# Patient Record
Sex: FEMALE | Race: BLACK OR AFRICAN AMERICAN | NOT HISPANIC OR LATINO | Employment: FULL TIME | URBAN - METROPOLITAN AREA
[De-identification: names, ages, dates, MRNs, and addresses within clinical notes are randomized per-mention and may not be internally consistent; named-entity substitution may affect disease eponyms.]

---

## 2017-03-03 ENCOUNTER — COMMUNICATION - HEALTHEAST (OUTPATIENT)
Dept: FAMILY MEDICINE | Facility: CLINIC | Age: 32
End: 2017-03-03

## 2017-03-03 DIAGNOSIS — F33.9 MAJOR DEPRESSIVE DISORDER, RECURRENT EPISODE, UNSPECIFIED: ICD-10-CM

## 2017-03-29 ENCOUNTER — OFFICE VISIT - HEALTHEAST (OUTPATIENT)
Dept: FAMILY MEDICINE | Facility: CLINIC | Age: 32
End: 2017-03-29

## 2017-03-29 DIAGNOSIS — F33.9 EPISODE OF RECURRENT MAJOR DEPRESSIVE DISORDER, UNSPECIFIED DEPRESSION EPISODE SEVERITY (H): ICD-10-CM

## 2017-03-29 DIAGNOSIS — F33.9 MAJOR DEPRESSIVE DISORDER, RECURRENT EPISODE, UNSPECIFIED: ICD-10-CM

## 2017-03-29 DIAGNOSIS — N39.0 UTI (URINARY TRACT INFECTION): ICD-10-CM

## 2017-03-29 DIAGNOSIS — M79.641 BILATERAL HAND PAIN: ICD-10-CM

## 2017-03-29 DIAGNOSIS — M79.642 BILATERAL HAND PAIN: ICD-10-CM

## 2017-03-29 DIAGNOSIS — D64.9 ANEMIA: ICD-10-CM

## 2017-03-29 DIAGNOSIS — R53.83 FATIGUE: ICD-10-CM

## 2017-03-29 DIAGNOSIS — E55.9 VITAMIN D DEFICIENCY: ICD-10-CM

## 2017-03-29 DIAGNOSIS — R30.0 DYSURIA: ICD-10-CM

## 2017-03-29 ASSESSMENT — MIFFLIN-ST. JEOR: SCORE: 1389.42

## 2017-04-08 ENCOUNTER — COMMUNICATION - HEALTHEAST (OUTPATIENT)
Dept: FAMILY MEDICINE | Facility: CLINIC | Age: 32
End: 2017-04-08

## 2017-04-09 ENCOUNTER — COMMUNICATION - HEALTHEAST (OUTPATIENT)
Dept: FAMILY MEDICINE | Facility: CLINIC | Age: 32
End: 2017-04-09

## 2017-04-10 ENCOUNTER — COMMUNICATION - HEALTHEAST (OUTPATIENT)
Dept: FAMILY MEDICINE | Facility: CLINIC | Age: 32
End: 2017-04-10

## 2017-04-10 ENCOUNTER — OFFICE VISIT - HEALTHEAST (OUTPATIENT)
Dept: FAMILY MEDICINE | Facility: CLINIC | Age: 32
End: 2017-04-10

## 2017-04-10 DIAGNOSIS — T78.40XA ALLERGIC REACTION CAUSED BY A DRUG: ICD-10-CM

## 2017-04-10 DIAGNOSIS — R21 RASH: ICD-10-CM

## 2017-04-13 ENCOUNTER — COMMUNICATION - HEALTHEAST (OUTPATIENT)
Dept: FAMILY MEDICINE | Facility: CLINIC | Age: 32
End: 2017-04-13

## 2017-04-13 ENCOUNTER — OFFICE VISIT - HEALTHEAST (OUTPATIENT)
Dept: FAMILY MEDICINE | Facility: CLINIC | Age: 32
End: 2017-04-13

## 2017-04-13 DIAGNOSIS — R21 RASH: ICD-10-CM

## 2017-04-13 DIAGNOSIS — R21 MALAR RASH: ICD-10-CM

## 2017-04-13 DIAGNOSIS — N39.0 UTI (URINARY TRACT INFECTION): ICD-10-CM

## 2017-04-15 ENCOUNTER — COMMUNICATION - HEALTHEAST (OUTPATIENT)
Dept: SCHEDULING | Facility: CLINIC | Age: 32
End: 2017-04-15

## 2017-04-17 LAB
LAB AP CHARGES (HE HISTORICAL CONVERSION): NORMAL
PATH REPORT.COMMENTS IMP SPEC: NORMAL
PATH REPORT.COMMENTS IMP SPEC: NORMAL
PATH REPORT.FINAL DX SPEC: NORMAL
PATH REPORT.GROSS SPEC: NORMAL
PATH REPORT.MICROSCOPIC SPEC OTHER STN: NORMAL
PATH REPORT.RELEVANT HX SPEC: NORMAL
RESULT FLAG (HE HISTORICAL CONVERSION): NORMAL

## 2017-04-18 LAB
ANA SER QL: 0.9 U
DNA (DS) ANTIBODY - HISTORICAL: 6 IU

## 2017-05-04 ENCOUNTER — COMMUNICATION - HEALTHEAST (OUTPATIENT)
Dept: FAMILY MEDICINE | Facility: CLINIC | Age: 32
End: 2017-05-04

## 2017-05-15 ENCOUNTER — RECORDS - HEALTHEAST (OUTPATIENT)
Dept: GENERAL RADIOLOGY | Age: 32
End: 2017-05-15

## 2017-05-15 ENCOUNTER — OFFICE VISIT - HEALTHEAST (OUTPATIENT)
Dept: RHEUMATOLOGY | Facility: CLINIC | Age: 32
End: 2017-05-15

## 2017-05-15 DIAGNOSIS — M25.50 PAIN IN UNSPECIFIED JOINT: ICD-10-CM

## 2017-05-15 DIAGNOSIS — M13.0 POLYARTHROPATHY, MULTIPLE SITES: ICD-10-CM

## 2017-05-15 DIAGNOSIS — M25.50 POLYARTHRALGIA: ICD-10-CM

## 2017-05-15 DIAGNOSIS — M13.0 POLYARTHRITIS, UNSPECIFIED: ICD-10-CM

## 2017-05-16 LAB
C-ANCA - HISTORICAL: NEGATIVE
HCV AB SERPL QL IA: NEGATIVE
P-ANCA - HISTORICAL: NEGATIVE

## 2017-07-05 ENCOUNTER — OFFICE VISIT - HEALTHEAST (OUTPATIENT)
Dept: RHEUMATOLOGY | Facility: CLINIC | Age: 32
End: 2017-07-05

## 2017-07-05 DIAGNOSIS — R53.83 FATIGUE: ICD-10-CM

## 2017-07-05 DIAGNOSIS — M25.50 POLYARTHRALGIA: ICD-10-CM

## 2017-07-05 DIAGNOSIS — R21 RASH: ICD-10-CM

## 2017-07-05 ASSESSMENT — MIFFLIN-ST. JEOR: SCORE: 1390.32

## 2017-08-30 ENCOUNTER — OFFICE VISIT - HEALTHEAST (OUTPATIENT)
Dept: RHEUMATOLOGY | Facility: CLINIC | Age: 32
End: 2017-08-30

## 2017-08-30 DIAGNOSIS — M79.641 BILATERAL HAND PAIN: ICD-10-CM

## 2017-08-30 DIAGNOSIS — M79.642 BILATERAL HAND PAIN: ICD-10-CM

## 2017-08-30 DIAGNOSIS — M25.50 POLYARTHRALGIA: ICD-10-CM

## 2017-08-30 DIAGNOSIS — M06.4 INFLAMMATORY POLYARTHRITIS (H): ICD-10-CM

## 2017-08-30 ASSESSMENT — MIFFLIN-ST. JEOR: SCORE: 1368.55

## 2017-10-17 ENCOUNTER — COMMUNICATION - HEALTHEAST (OUTPATIENT)
Dept: FAMILY MEDICINE | Facility: CLINIC | Age: 32
End: 2017-10-17

## 2017-10-17 DIAGNOSIS — F33.0 MILD EPISODE OF RECURRENT MAJOR DEPRESSIVE DISORDER (H): ICD-10-CM

## 2017-11-15 ENCOUNTER — COMMUNICATION - HEALTHEAST (OUTPATIENT)
Dept: RHEUMATOLOGY | Facility: CLINIC | Age: 32
End: 2017-11-15

## 2017-11-15 DIAGNOSIS — M06.4 INFLAMMATORY POLYARTHRITIS (H): ICD-10-CM

## 2017-11-21 ENCOUNTER — HOSPITAL ENCOUNTER (OUTPATIENT)
Dept: MRI IMAGING | Facility: HOSPITAL | Age: 32
Discharge: HOME | End: 2017-11-21
Attending: FAMILY MEDICINE

## 2017-11-21 ENCOUNTER — OFFICE VISIT - HEALTHEAST (OUTPATIENT)
Dept: FAMILY MEDICINE | Facility: CLINIC | Age: 32
End: 2017-11-21

## 2017-11-21 DIAGNOSIS — F33.0 MILD EPISODE OF RECURRENT MAJOR DEPRESSIVE DISORDER (H): ICD-10-CM

## 2017-11-21 DIAGNOSIS — M54.2 ACUTE NECK PAIN: ICD-10-CM

## 2017-11-21 DIAGNOSIS — R29.2 HYPERREFLEXIA OF LOWER EXTREMITY: ICD-10-CM

## 2017-11-21 DIAGNOSIS — R53.1 WEAKNESS OF LEFT SIDE OF BODY: ICD-10-CM

## 2017-11-21 ASSESSMENT — MIFFLIN-ST. JEOR: SCORE: 1348.59

## 2017-11-21 NOTE — ASSESSMENT & PLAN NOTE
CBC is normal.  MRI is also unremarkable.  I suspect she has some inflammation in her cervical spine that is causing her symptoms.      I have recommended prednisone 10 mg orally per day (and wean down gradually as tolerated,  as long as sx are abated, back to 2.5mg orally per day).  She says she has plenty of prednisone at home from Dr. Millan so she will start taking 10 mg orally per day and I have asked her to call me tomorrow to ensure that this is helping her symptoms.    See hyperreflexia of upper and lower extremity on the left side and weakness of the left side in the problem list    Neurology consult has been obtained and she has an appointment with the neurologist on December 12.

## 2017-11-29 ENCOUNTER — OFFICE VISIT - HEALTHEAST (OUTPATIENT)
Dept: RHEUMATOLOGY | Facility: CLINIC | Age: 32
End: 2017-11-29

## 2017-11-29 DIAGNOSIS — M79.642 BILATERAL HAND PAIN: ICD-10-CM

## 2017-11-29 DIAGNOSIS — M06.4 INFLAMMATORY POLYARTHRITIS (H): ICD-10-CM

## 2017-11-29 DIAGNOSIS — M79.641 BILATERAL HAND PAIN: ICD-10-CM

## 2017-11-29 DIAGNOSIS — M25.50 POLYARTHRALGIA: ICD-10-CM

## 2017-11-29 ASSESSMENT — MIFFLIN-ST. JEOR: SCORE: 1348.59

## 2017-12-02 ENCOUNTER — COMMUNICATION - HEALTHEAST (OUTPATIENT)
Dept: RHEUMATOLOGY | Facility: CLINIC | Age: 32
End: 2017-12-02

## 2017-12-02 DIAGNOSIS — M06.4 INFLAMMATORY POLYARTHRITIS (H): ICD-10-CM

## 2017-12-02 DIAGNOSIS — M79.641 BILATERAL HAND PAIN: ICD-10-CM

## 2017-12-02 DIAGNOSIS — M79.642 BILATERAL HAND PAIN: ICD-10-CM

## 2018-02-13 ENCOUNTER — COMMUNICATION - HEALTHEAST (OUTPATIENT)
Dept: RHEUMATOLOGY | Facility: CLINIC | Age: 33
End: 2018-02-13

## 2018-02-13 DIAGNOSIS — M06.4 INFLAMMATORY POLYARTHRITIS (H): ICD-10-CM

## 2018-02-21 ENCOUNTER — OFFICE VISIT - HEALTHEAST (OUTPATIENT)
Dept: FAMILY MEDICINE | Facility: CLINIC | Age: 33
End: 2018-02-21

## 2018-02-21 DIAGNOSIS — H69.92 EUSTACHIAN TUBE DYSFUNCTION, LEFT: ICD-10-CM

## 2018-02-21 DIAGNOSIS — R07.0 THROAT PAIN: ICD-10-CM

## 2018-02-21 LAB
DEPRECATED S PYO AG THROAT QL EIA: NORMAL
FLUAV AG SPEC QL IA: NORMAL
FLUBV AG SPEC QL IA: NORMAL

## 2018-02-23 LAB — GROUP A STREP BY PCR: NORMAL

## 2018-05-02 ENCOUNTER — OFFICE VISIT - HEALTHEAST (OUTPATIENT)
Dept: RHEUMATOLOGY | Facility: CLINIC | Age: 33
End: 2018-05-02

## 2018-05-02 DIAGNOSIS — M06.4 INFLAMMATORY POLYARTHRITIS (H): ICD-10-CM

## 2018-05-02 DIAGNOSIS — M25.50 POLYARTHRALGIA: ICD-10-CM

## 2018-05-02 ASSESSMENT — MIFFLIN-ST. JEOR: SCORE: 1393.95

## 2018-10-24 ENCOUNTER — OFFICE VISIT - HEALTHEAST (OUTPATIENT)
Dept: FAMILY MEDICINE | Facility: CLINIC | Age: 33
End: 2018-10-24

## 2018-10-24 DIAGNOSIS — F33.9 MAJOR DEPRESSIVE DISORDER, RECURRENT EPISODE (H): ICD-10-CM

## 2018-11-05 ENCOUNTER — COMMUNICATION - HEALTHEAST (OUTPATIENT)
Dept: FAMILY MEDICINE | Facility: CLINIC | Age: 33
End: 2018-11-05

## 2018-11-28 ENCOUNTER — OFFICE VISIT - HEALTHEAST (OUTPATIENT)
Dept: FAMILY MEDICINE | Facility: CLINIC | Age: 33
End: 2018-11-28

## 2018-11-28 DIAGNOSIS — N64.89 BREAST ASYMMETRY IN FEMALE: ICD-10-CM

## 2018-11-28 DIAGNOSIS — Z12.4 SCREENING FOR MALIGNANT NEOPLASM OF CERVIX: ICD-10-CM

## 2018-11-28 DIAGNOSIS — D64.9 ANEMIA, UNSPECIFIED TYPE: ICD-10-CM

## 2018-11-28 DIAGNOSIS — Z00.00 PREVENTATIVE HEALTH CARE: ICD-10-CM

## 2018-11-28 DIAGNOSIS — F33.0 MILD EPISODE OF RECURRENT MAJOR DEPRESSIVE DISORDER (H): ICD-10-CM

## 2018-11-28 DIAGNOSIS — F33.9 MAJOR DEPRESSIVE DISORDER, RECURRENT EPISODE (H): ICD-10-CM

## 2018-11-28 DIAGNOSIS — Z13.228 ENCOUNTER FOR SCREENING FOR METABOLIC DISORDER: ICD-10-CM

## 2018-11-28 DIAGNOSIS — E55.9 VITAMIN D DEFICIENCY: ICD-10-CM

## 2018-11-28 DIAGNOSIS — Z23 NEEDS FLU SHOT: ICD-10-CM

## 2018-11-28 DIAGNOSIS — Z13.220 LIPID SCREENING: ICD-10-CM

## 2018-11-28 LAB
ALBUMIN SERPL-MCNC: 4.3 G/DL (ref 3.5–5)
ALP SERPL-CCNC: 46 U/L (ref 45–120)
ALT SERPL W P-5'-P-CCNC: 16 U/L (ref 0–45)
ANION GAP SERPL CALCULATED.3IONS-SCNC: 8 MMOL/L (ref 5–18)
AST SERPL W P-5'-P-CCNC: 21 U/L (ref 0–40)
BASOPHILS # BLD AUTO: 0 THOU/UL (ref 0–0.2)
BASOPHILS NFR BLD AUTO: 0 % (ref 0–2)
BILIRUB SERPL-MCNC: 0.4 MG/DL (ref 0–1)
BUN SERPL-MCNC: 13 MG/DL (ref 8–22)
CALCIUM SERPL-MCNC: 9.4 MG/DL (ref 8.5–10.5)
CHLORIDE BLD-SCNC: 104 MMOL/L (ref 98–107)
CHOLEST SERPL-MCNC: 229 MG/DL
CO2 SERPL-SCNC: 26 MMOL/L (ref 22–31)
CREAT SERPL-MCNC: 0.78 MG/DL (ref 0.6–1.1)
EOSINOPHIL # BLD AUTO: 0.1 THOU/UL (ref 0–0.4)
EOSINOPHIL NFR BLD AUTO: 4 % (ref 0–6)
ERYTHROCYTE [DISTWIDTH] IN BLOOD BY AUTOMATED COUNT: 13.7 % (ref 11–14.5)
FASTING STATUS PATIENT QL REPORTED: YES
GFR SERPL CREATININE-BSD FRML MDRD: >60 ML/MIN/1.73M2
GLUCOSE BLD-MCNC: 86 MG/DL (ref 70–125)
HCT VFR BLD AUTO: 33.8 % (ref 35–47)
HDLC SERPL-MCNC: 79 MG/DL
HGB BLD-MCNC: 11.1 G/DL (ref 12–16)
LDLC SERPL CALC-MCNC: 136 MG/DL
LYMPHOCYTES # BLD AUTO: 1.5 THOU/UL (ref 0.8–4.4)
LYMPHOCYTES NFR BLD AUTO: 39 % (ref 20–40)
MCH RBC QN AUTO: 24.9 PG (ref 27–34)
MCHC RBC AUTO-ENTMCNC: 32.9 G/DL (ref 32–36)
MCV RBC AUTO: 76 FL (ref 80–100)
MONOCYTES # BLD AUTO: 0.3 THOU/UL (ref 0–0.9)
MONOCYTES NFR BLD AUTO: 7 % (ref 2–10)
NEUTROPHILS # BLD AUTO: 1.8 THOU/UL (ref 2–7.7)
NEUTROPHILS NFR BLD AUTO: 50 % (ref 50–70)
PLATELET # BLD AUTO: 253 THOU/UL (ref 140–440)
PMV BLD AUTO: 8.5 FL (ref 7–10)
POTASSIUM BLD-SCNC: 4.1 MMOL/L (ref 3.5–5)
PROT SERPL-MCNC: 7.1 G/DL (ref 6–8)
RBC # BLD AUTO: 4.46 MILL/UL (ref 3.8–5.4)
SODIUM SERPL-SCNC: 138 MMOL/L (ref 136–145)
TRIGL SERPL-MCNC: 69 MG/DL
WBC: 3.7 THOU/UL (ref 4–11)

## 2018-11-28 ASSESSMENT — MIFFLIN-ST. JEOR: SCORE: 1376.94

## 2018-11-28 NOTE — ASSESSMENT & PLAN NOTE
Vitamin D, Total (25-Hydroxy)   Date Value Ref Range Status   02/17/2016 18.2 (L) 30.0 - 80.0 ng/mL Final     Will recheck.

## 2018-11-28 NOTE — ASSESSMENT & PLAN NOTE
Flu shot recommended.  Pap: normal 4/2014 and due again 4/2019 but since she is here now it is offered.   Mammo:  There is no family or personal history, not indicated    Colonoscopy:  There is no family or personal history, not indicated    Std testing desired:  offered  Osteoporosis prevention discussed.  vitamin d levels ordered. Recommend daily calcium and vitamin d intake to keep good bone health. Recommend weight bearing exercise, no tobacco, and limit alcohol  dexa - not indicated.  Recommend sunscreen, exercise, & healthy diet.  Offered tsh, glucose, hgb, lipid  I have had an Advance Directives discussion with the patient.   Body mass index is 23.21 kg/m .   mychart active.

## 2018-11-29 LAB
25(OH)D3 SERPL-MCNC: 55.7 NG/ML (ref 30–80)
25(OH)D3 SERPL-MCNC: 55.7 NG/ML (ref 30–80)

## 2018-12-03 LAB
BKR LAB AP ABNORMAL BLEEDING: NO
BKR LAB AP BIRTH CONTROL/HORMONES: ABNORMAL
BKR LAB AP CERVICAL APPEARANCE: NORMAL
BKR LAB AP GYN ADEQUACY: ABNORMAL
BKR LAB AP GYN INTERPRETATION: ABNORMAL
BKR LAB AP HPV REFLEX: ABNORMAL
BKR LAB AP LMP: ABNORMAL
BKR LAB AP PATIENT STATUS: ABNORMAL
BKR LAB AP PREVIOUS ABNORMAL: ABNORMAL
BKR LAB AP PREVIOUS NORMAL: 2014
HIGH RISK?: NO
PATH REPORT.COMMENTS IMP SPEC: ABNORMAL
RESULT FLAG (HE HISTORICAL CONVERSION): ABNORMAL

## 2018-12-04 LAB
HPV SOURCE: NORMAL
HUMAN PAPILLOMA VIRUS 16 DNA: NEGATIVE
HUMAN PAPILLOMA VIRUS 18 DNA: NEGATIVE
HUMAN PAPILLOMA VIRUS FINAL DIAGNOSIS: NORMAL
HUMAN PAPILLOMA VIRUS OTHER HR: NEGATIVE
SPECIMEN DESCRIPTION: NORMAL

## 2018-12-07 ENCOUNTER — COMMUNICATION - HEALTHEAST (OUTPATIENT)
Dept: FAMILY MEDICINE | Facility: CLINIC | Age: 33
End: 2018-12-07

## 2018-12-10 ENCOUNTER — AMBULATORY - HEALTHEAST (OUTPATIENT)
Dept: FAMILY MEDICINE | Facility: CLINIC | Age: 33
End: 2018-12-10

## 2018-12-10 DIAGNOSIS — D72.819 LEUKOPENIA, UNSPECIFIED TYPE: ICD-10-CM

## 2018-12-10 DIAGNOSIS — D50.0 IRON DEFICIENCY ANEMIA DUE TO CHRONIC BLOOD LOSS: ICD-10-CM

## 2018-12-12 ENCOUNTER — AMBULATORY - HEALTHEAST (OUTPATIENT)
Dept: LAB | Facility: CLINIC | Age: 33
End: 2018-12-12

## 2018-12-12 ENCOUNTER — OFFICE VISIT - HEALTHEAST (OUTPATIENT)
Dept: FAMILY MEDICINE | Facility: CLINIC | Age: 33
End: 2018-12-12

## 2018-12-12 DIAGNOSIS — R87.610 ASCUS OF CERVIX WITH NEGATIVE HIGH RISK HPV: ICD-10-CM

## 2018-12-12 DIAGNOSIS — Z00.00 PREVENTATIVE HEALTH CARE: ICD-10-CM

## 2018-12-12 DIAGNOSIS — D72.819 LEUKOPENIA, UNSPECIFIED TYPE: ICD-10-CM

## 2018-12-12 DIAGNOSIS — E55.9 VITAMIN D DEFICIENCY: ICD-10-CM

## 2018-12-12 DIAGNOSIS — D50.0 IRON DEFICIENCY ANEMIA DUE TO CHRONIC BLOOD LOSS: ICD-10-CM

## 2018-12-12 DIAGNOSIS — E78.2 MIXED HYPERLIPIDEMIA: ICD-10-CM

## 2018-12-12 NOTE — ASSESSMENT & PLAN NOTE
Lab Results   Component Value Date    CHOL 229 (H) 11/28/2018    CHOL 176 04/02/2014     Lab Results   Component Value Date    HDL 79 11/28/2018    HDL 72 04/02/2014     Lab Results   Component Value Date    LDLCALC 136 (H) 11/28/2018    LDLCALC 96.6 04/02/2014     Lab Results   Component Value Date    TRIG 69 11/28/2018    TRIG 37 04/02/2014     No components found for: CHOLHDL    lipids up - statin decision aid at follow up - statin currently not indicated. Continue to monitor and continue healthy lifestyle.

## 2018-12-13 LAB
BASOPHILS # BLD AUTO: 0 THOU/UL (ref 0–0.2)
BASOPHILS NFR BLD AUTO: 0 % (ref 0–2)
EOSINOPHIL # BLD AUTO: 0.2 THOU/UL (ref 0–0.4)
EOSINOPHIL NFR BLD AUTO: 5 % (ref 0–6)
ERYTHROCYTE [DISTWIDTH] IN BLOOD BY AUTOMATED COUNT: 15.6 % (ref 11–14.5)
HCT VFR BLD AUTO: 37.6 % (ref 35–47)
HGB BLD-MCNC: 11.3 G/DL (ref 12–16)
LAB AP CHARGES (HE HISTORICAL CONVERSION): NORMAL
LYMPHOCYTES # BLD AUTO: 1.7 THOU/UL (ref 0.8–4.4)
LYMPHOCYTES NFR BLD AUTO: 38 % (ref 20–40)
MCH RBC QN AUTO: 24.5 PG (ref 27–34)
MCHC RBC AUTO-ENTMCNC: 30.1 G/DL (ref 32–36)
MCV RBC AUTO: 81 FL (ref 80–100)
MONOCYTES # BLD AUTO: 0.4 THOU/UL (ref 0–0.9)
MONOCYTES NFR BLD AUTO: 10 % (ref 2–10)
NEUTROPHILS # BLD AUTO: 2.2 THOU/UL (ref 2–7.7)
NEUTROPHILS NFR BLD AUTO: 48 % (ref 50–70)
OVALOCYTES: ABNORMAL
PATH REPORT.COMMENTS IMP SPEC: NORMAL
PATH REPORT.COMMENTS IMP SPEC: NORMAL
PATH REPORT.FINAL DX SPEC: NORMAL
PATH REPORT.MICROSCOPIC SPEC OTHER STN: ABNORMAL
PATH REPORT.MICROSCOPIC SPEC OTHER STN: NORMAL
PATH REPORT.RELEVANT HX SPEC: NORMAL
PLAT MORPH BLD: NORMAL
PLATELET # BLD AUTO: 283 THOU/UL (ref 140–440)
PMV BLD AUTO: 11.2 FL (ref 8.5–12.5)
RBC # BLD AUTO: 4.62 MILL/UL (ref 3.8–5.4)
WBC: 4.6 THOU/UL (ref 4–11)

## 2018-12-17 ENCOUNTER — HOSPITAL ENCOUNTER (OUTPATIENT)
Dept: ULTRASOUND IMAGING | Facility: CLINIC | Age: 33
Discharge: HOME | End: 2018-12-17
Attending: FAMILY MEDICINE

## 2018-12-17 ENCOUNTER — HOSPITAL ENCOUNTER (OUTPATIENT)
Dept: MAMMOGRAPHY | Facility: CLINIC | Age: 33
Discharge: HOME | End: 2018-12-17
Attending: FAMILY MEDICINE

## 2018-12-17 DIAGNOSIS — N64.89 BREAST ASYMMETRY IN FEMALE: ICD-10-CM

## 2019-10-02 ENCOUNTER — OFFICE VISIT - HEALTHEAST (OUTPATIENT)
Dept: FAMILY MEDICINE | Facility: CLINIC | Age: 34
End: 2019-10-02

## 2019-10-02 ENCOUNTER — RECORDS - HEALTHEAST (OUTPATIENT)
Dept: GENERAL RADIOLOGY | Facility: CLINIC | Age: 34
End: 2019-10-02

## 2019-10-02 DIAGNOSIS — M54.2 CERVICALGIA: ICD-10-CM

## 2019-10-02 DIAGNOSIS — M54.2 NECK PAIN: ICD-10-CM

## 2019-10-02 ASSESSMENT — PATIENT HEALTH QUESTIONNAIRE - PHQ9: SUM OF ALL RESPONSES TO PHQ QUESTIONS 1-9: 7

## 2019-10-02 ASSESSMENT — MIFFLIN-ST. JEOR: SCORE: 1404.16

## 2019-10-02 NOTE — ASSESSMENT & PLAN NOTE
NEW PROBLEM  She has not had neck pain since 2017, but fell 2.5 weeks ago on Sunday.  Since then she has had bad pain that has persisted and it is limiting her range of motion and activities. Xray today was normal and was reviewed personally and later by radiology    Because pain is out of proportion to exam, I would like to get an MRI neck to rule out occult fx, although it seems unlikely she would have been able to put up with such a serious injury for 2.5 weeks before seeking medical attention, but still I would like to be sure we are not missing fx. I think it is more likely that she may have arthropathy (as she does have baseline arthritis) in her neck that has flared up with the trauma from her fall.     Recommend ibuprofen 800mg po three times a day and acetaminophen up to 4000 mg po per 24 hours   Flexeril and norco given to use for break through pain sparingly. She knows these are potentially addictive. She will follow up for any needed refills. She is cautioned not to use more than 4000 mg acetaminophen daily and she knows acetaminophen is in the norco    Follow up in 1 week if pain persists, and sooner if worsening.

## 2019-10-18 ENCOUNTER — COMMUNICATION - HEALTHEAST (OUTPATIENT)
Dept: FAMILY MEDICINE | Facility: CLINIC | Age: 34
End: 2019-10-18

## 2020-03-31 ENCOUNTER — VIRTUAL VISIT (OUTPATIENT)
Dept: FAMILY MEDICINE | Facility: OTHER | Age: 35
End: 2020-03-31

## 2020-03-31 NOTE — PROGRESS NOTES
"Date: 2020 10:40:26  Clinician: Crystal Godoy  Clinician NPI: [id number]  Patient: Stephenie Menendez  Patient : 1985  Patient Address: [address] [place] [address]  Patient Phone: [phone]  Visit Protocol: URI  Patient Summary:  Stephenie is a 34 year old ( : 1985 ) female who initiated a Visit for COVID-19 (Coronavirus) evaluation and screening. When asked the question \"Please sign me up to receive news, health information and promotions. \", Stephenie responded \"No\".    Stephenie states her symptoms started 1-2 days ago.   Her symptoms consist of a headache, myalgia, chills, a sore throat, a cough, malaise, and enlarged lymph nodes. Stephenie also feels feverish.   Symptom details     Cough: Stephenie coughs a few times an hour and her cough is not more bothersome at night. Phlegm does not come into her throat when she coughs. She does not believe her cough is caused by post-nasal drip.     Sore throat: Stephenie reports having moderate throat pain (4-6 on a 10 point pain scale), does not have exudate on her tonsils, and can swallow liquids. The lymph nodes in her neck are enlarged. A rash has not appeared on the skin since the sore throat started.     Temperature: Her current temperature is 99.6 degrees Fahrenheit.     Headache: She states the headache is mild (1-3 on a 10 point pain scale).      Stephenie denies having rhinitis, teeth pain, facial pain or pressure, wheezing, nasal congestion, and ear pain. She also denies having a sinus infection within the past year, taking antibiotic medication for the symptoms, and having recent facial or sinus surgery in the past 60 days. She is not experiencing dyspnea.   Precipitating events  Within the past week, Stephenie has not been exposed to someone with strep throat. She has not recently been exposed to someone with influenza. Stephenie has been in close contact with the following high risk individuals: people with asthma, heart " disease or diabetes.   Pertinent COVID-19 (Coronavirus) information  Stephenie has not traveled internationally or to the areas where COVID-19 (Coronavirus) is widespread, including cruise ship travel in the last 14 days before the start of her symptoms.   Stephenie has not had a close contact with a laboratory-confirmed COVID-19 patient within 14 days of symptom onset. She also has not had a close contact with a suspected COVID-19 patient within 14 days of symptom onset.   Stephenie is not a healthcare worker or a  and does not work in a healthcare facility. She does not live with a healthcare worker.   Pertinent medical history  Stephenie typically gets a yeast infection when she takes antibiotics. She is not sure if she has used fluconazole (Diflucan) to treat previous yeast infections.   Stephenie does not need a return to work/school note.   Weight: 150 lbs   Stephenie does not smoke or use smokeless tobacco.   She denies pregnancy and denies breastfeeding. She has menstruated in the past month.   Additional information as reported by the patient (free text): Fever, sore throat, aches all over body, fatigue.   Weight: 150 lbs    MEDICATIONS: Wellbutrin XL oral, buspirone oral, ALLERGIES: sumatriptan, Cymbalta  Clinician Response:  Dear Stephenie,   Based on the information you have provided, you do have symptoms that are consistent with Coronavirus (COVID-19).  The coronavirus causes mild to severe respiratory illness with the most common symptoms including fever, cough and difficulty breathing. Unfortunately, many viruses cause similar symptoms and it can be difficult to distinguish between viruses, especially in mild cases, so we are presuming that anyone with cough or fever has coronavirus at this time.  Coronavirus/COVID-19 has reached the point of community spread in [place], meaning that we are finding the virus in people with no known exposure risk for sandra the virus. Given the  increasing commonness of coronavirus in the community we are no longer testing patients who are not critically ill.  If you are a health care worker, you should refer to your employee health office for instructions about testing and returning to work.  For everyone else who has cough or fever, you should assume you are infected with coronavirus. Since you will not be tested but have symptoms that may be consistent with coronavirus, the CDC recommends you stay in self-isolation until these three things have happened:    You have had no fever for at least 72 hours (that is three full days of no fever without the use of medicine that reduces fevers)    AND   Other symptoms have improved (for example, when your cough or shortness of breath have improved)   AND   At least 7 days have passed since your symptoms first appeared.   How to Isolate:   Isolate yourself at home.  Do Not allow any visitors  Do Not go to work or school  Do Not go to [employer],  centers, shopping, or other public places.  Do Not shake hands.  Avoid close contact with others (hugging, kissing).   Protect Others:   Cover Your Mouth and Nose with a mask, disposable tissue or wash cloth to avoid spreading germs to others.  Wash your hands and face frequently with soap and water.   We know it can be scary to hear that you might have COVID-19. Our team can help track your symptoms and make sure you are doing ok over the next two weeks using a program called [employer] to keep in touch. When you receive an email from [employer], please consider enrolling in our monitoring program. There is no cost to you for monitoring. Here is a URL where you can learn more: [email or web address]  Managing Symptoms:   At this time, we primarily recommend Tylenol (Acetaminophen) for fever or pain. If you have liver or kidney problems, contact your primary care provider for instructions on use of tylenol. Adults can take 650 mg (two 325 mg pills) by  mouth every 4-6 hours as needed OR 1,000 mg (two 500 mg pills) every 8 hours as needed. MAXIMUM DAILY DOSE: 3,000mg. For children, refer to dosing on bottle based on age or weight.   If you develop significant shortness of breath that prevents you from doing normal activities, please call 911 or proceed to the nearest emergency room and alert them immediately that you have been in self-isolation for possible coronavirus.  If you have a higher risk medical condition such as cancer, heart failure, end stage renal disease on dialysis or have a transplant, please reach out to your specialist's clinic to advise them of your [facility] visit should you not improve within the next two days.   For more information about COVID19 and options for caring for yourself at home, please visit the CDC website at https://www.cdc.gov/coronavirus/2019-ncov/about/steps-when-sick.htmlFor more options for care at [facility], please visit our website at [email or web address]    Diagnosis: Cough  Diagnosis ICD: R05

## 2020-04-21 ENCOUNTER — COMMUNICATION - HEALTHEAST (OUTPATIENT)
Dept: FAMILY MEDICINE | Facility: CLINIC | Age: 35
End: 2020-04-21

## 2020-04-21 DIAGNOSIS — U07.1 2019 NOVEL CORONAVIRUS DISEASE (COVID-19): ICD-10-CM

## 2020-04-22 ENCOUNTER — OFFICE VISIT - HEALTHEAST (OUTPATIENT)
Dept: FAMILY MEDICINE | Facility: CLINIC | Age: 35
End: 2020-04-22

## 2020-04-22 DIAGNOSIS — Z20.822 SUSPECTED 2019 NOVEL CORONAVIRUS INFECTION: ICD-10-CM

## 2020-04-22 ASSESSMENT — PATIENT HEALTH QUESTIONNAIRE - PHQ9: SUM OF ALL RESPONSES TO PHQ QUESTIONS 1-9: 7

## 2020-05-20 ENCOUNTER — COMMUNICATION - HEALTHEAST (OUTPATIENT)
Dept: SCHEDULING | Facility: CLINIC | Age: 35
End: 2020-05-20

## 2020-05-21 ENCOUNTER — OFFICE VISIT - HEALTHEAST (OUTPATIENT)
Dept: FAMILY MEDICINE | Facility: CLINIC | Age: 35
End: 2020-05-21

## 2020-05-21 DIAGNOSIS — U07.1 2019 NOVEL CORONAVIRUS DISEASE (COVID-19): ICD-10-CM

## 2020-05-21 DIAGNOSIS — R10.84 ABDOMINAL PAIN, GENERALIZED: ICD-10-CM

## 2020-05-21 DIAGNOSIS — T14.8XXA BRUISING: ICD-10-CM

## 2020-05-21 DIAGNOSIS — R52 BODY ACHES: ICD-10-CM

## 2020-05-21 DIAGNOSIS — R05.9 COUGH: ICD-10-CM

## 2020-05-21 DIAGNOSIS — R00.2 HEART PALPITATIONS: ICD-10-CM

## 2020-05-21 DIAGNOSIS — R19.7 DIARRHEA, UNSPECIFIED TYPE: ICD-10-CM

## 2020-05-21 DIAGNOSIS — R06.02 SOB (SHORTNESS OF BREATH): ICD-10-CM

## 2020-05-21 LAB
ALBUMIN SERPL-MCNC: 4.6 G/DL (ref 3.5–5)
ALBUMIN UR-MCNC: NEGATIVE MG/DL
ALP SERPL-CCNC: 60 U/L (ref 45–120)
ALT SERPL W P-5'-P-CCNC: 15 U/L (ref 0–45)
ANION GAP SERPL CALCULATED.3IONS-SCNC: 10 MMOL/L (ref 5–18)
APPEARANCE UR: CLEAR
AST SERPL W P-5'-P-CCNC: 20 U/L (ref 0–40)
ATRIAL RATE - MUSE: 76 BPM
BASOPHILS # BLD AUTO: 0 THOU/UL (ref 0–0.2)
BASOPHILS NFR BLD AUTO: 0 % (ref 0–2)
BILIRUB DIRECT SERPL-MCNC: <0.1 MG/DL
BILIRUB SERPL-MCNC: 0.3 MG/DL (ref 0–1)
BILIRUB UR QL STRIP: NEGATIVE
BUN SERPL-MCNC: 10 MG/DL (ref 8–22)
C REACTIVE PROTEIN LHE: <0.1 MG/DL (ref 0–0.8)
CALCIUM SERPL-MCNC: 9.8 MG/DL (ref 8.5–10.5)
CHLORIDE BLD-SCNC: 103 MMOL/L (ref 98–107)
CK SERPL-CCNC: 125 U/L (ref 30–190)
CO2 SERPL-SCNC: 25 MMOL/L (ref 22–31)
COLOR UR AUTO: YELLOW
CREAT SERPL-MCNC: 0.8 MG/DL (ref 0.6–1.1)
D DIMER PPP FEU-MCNC: 0.33 FEU UG/ML
DIASTOLIC BLOOD PRESSURE - MUSE: NORMAL
EOSINOPHIL # BLD AUTO: 0.2 THOU/UL (ref 0–0.4)
EOSINOPHIL NFR BLD AUTO: 3 % (ref 0–6)
ERYTHROCYTE [DISTWIDTH] IN BLOOD BY AUTOMATED COUNT: 13.9 % (ref 11–14.5)
GFR SERPL CREATININE-BSD FRML MDRD: >60 ML/MIN/1.73M2
GLUCOSE BLD-MCNC: 92 MG/DL (ref 70–125)
GLUCOSE UR STRIP-MCNC: NEGATIVE MG/DL
HCT VFR BLD AUTO: 35.2 % (ref 35–47)
HGB BLD-MCNC: 11.8 G/DL (ref 12–16)
HGB UR QL STRIP: NEGATIVE
INTERPRETATION ECG - MUSE: NORMAL
KETONES UR STRIP-MCNC: NEGATIVE MG/DL
LEUKOCYTE ESTERASE UR QL STRIP: NEGATIVE
LYMPHOCYTES # BLD AUTO: 1.4 THOU/UL (ref 0.8–4.4)
LYMPHOCYTES NFR BLD AUTO: 28 % (ref 20–40)
MCH RBC QN AUTO: 25.4 PG (ref 27–34)
MCHC RBC AUTO-ENTMCNC: 33.5 G/DL (ref 32–36)
MCV RBC AUTO: 76 FL (ref 80–100)
MONOCYTES # BLD AUTO: 0.3 THOU/UL (ref 0–0.9)
MONOCYTES NFR BLD AUTO: 6 % (ref 2–10)
NEUTROPHILS # BLD AUTO: 3.1 THOU/UL (ref 2–7.7)
NEUTROPHILS NFR BLD AUTO: 62 % (ref 50–70)
NITRATE UR QL: NEGATIVE
P AXIS - MUSE: 39 DEGREES
PH UR STRIP: 5.5 [PH] (ref 5–8)
PLATELET # BLD AUTO: 258 THOU/UL (ref 140–440)
PMV BLD AUTO: 8.8 FL (ref 7–10)
POTASSIUM BLD-SCNC: 4 MMOL/L (ref 3.5–5)
PR INTERVAL - MUSE: 144 MS
PROT SERPL-MCNC: 7.7 G/DL (ref 6–8)
QRS DURATION - MUSE: 84 MS
QT - MUSE: 376 MS
QTC - MUSE: 423 MS
R AXIS - MUSE: -2 DEGREES
RBC # BLD AUTO: 4.66 MILL/UL (ref 3.8–5.4)
SODIUM SERPL-SCNC: 138 MMOL/L (ref 136–145)
SP GR UR STRIP: <=1.005 (ref 1–1.03)
SYSTOLIC BLOOD PRESSURE - MUSE: NORMAL
T AXIS - MUSE: -13 DEGREES
TSH SERPL DL<=0.005 MIU/L-ACNC: 0.88 UIU/ML (ref 0.3–5)
UROBILINOGEN UR STRIP-ACNC: NORMAL
VENTRICULAR RATE- MUSE: 76 BPM
WBC: 5 THOU/UL (ref 4–11)

## 2020-05-21 ASSESSMENT — MIFFLIN-ST. JEOR: SCORE: 1433.19

## 2020-10-01 DIAGNOSIS — Z11.59 SCREENING FOR VIRAL DISEASE: ICD-10-CM

## 2020-10-02 ENCOUNTER — AMBULATORY - HEALTHEAST (OUTPATIENT)
Dept: LAB | Facility: CLINIC | Age: 35
End: 2020-10-02

## 2020-10-02 DIAGNOSIS — Z11.59 SCREENING FOR VIRAL DISEASE: ICD-10-CM

## 2020-10-05 LAB — COVID-19 ANTIBODY IGG: NEGATIVE

## 2020-11-10 ENCOUNTER — VIRTUAL VISIT (OUTPATIENT)
Dept: FAMILY MEDICINE | Facility: OTHER | Age: 35
End: 2020-11-10

## 2020-11-10 NOTE — PROGRESS NOTES
"Date: 11/10/2020 13:20:13  Clinician: Perry Dsouza  Clinician NPI: [id number]  Patient: Stephenie Menendez  Patient : 1985  Patient Address: [address] [place] [address]  Patient Phone: [phone]  Visit Protocol: URI  Patient Summary:  Stephenie is a 34 year old ( : 1985 ) female who initiated a [facility] Visit for COVID-19 (Coronavirus) evaluation and screening. When asked the question \"Please sign me up to receive news, health information and promotions. \", Stephenie responded \"No\".    Stephenie states her symptoms started suddenly 3-4 days ago. After her symptoms started, they improved and then got worse again.   Her symptoms consist of myalgia, malaise, a headache, and a cough. She is experiencing mild difficulty breathing with activities but can speak normally in full sentences.   Symptom details     Cough: Stephenie coughs a few times an hour and her cough is not more bothersome at night. Phlegm does not come into her throat when she coughs. She does not believe her cough is caused by post-nasal drip.     Headache: She states the headache is mild (1-3 on a 10 point pain scale).      Stephenie denies having vomiting, rhinitis, facial pain or pressure, chills, sore throat, teeth pain, ageusia, diarrhea, ear pain, wheezing, fever, nasal congestion, nausea, and anosmia. She also denies taking antibiotic medication in the past month, having recent facial or sinus surgery in the past 60 days, and having a sinus infection within the past year.   Precipitating events  She has not recently been exposed to someone with influenza. Stephenie has been in close contact with the following high risk individuals: people with asthma, heart disease or diabetes.   Pertinent COVID-19 (Coronavirus) information  Stephenie does not work or volunteer as healthcare worker or a . In the past 14 days, Stephenie has not worked or volunteered at a healthcare facility or group living setting.   In " the past 14 days, she also has not lived in a congregate living setting.   Stephenie has not had a close contact with a laboratory-confirmed COVID-19 patient within 14 days of symptom onset.    Since December 2019, Stephenie has been tested for COVID-19 and has had upper respiratory infection (URI) or influenza-like illness.      Result of COVID-19 test: Positive      Date of her COVID-19 test: 04/27/2020     Date(s) of previous URI or influenza-like illness (free-text): 03/29/2020 to 07/15/2020     Symptoms Stephenie experienced during previous URI or influenza-like illness as reported by the patient (free-text): Covid19, cough, shortness of breath, fever, chills, aches, heart palpitations, low 02 saturation        Pertinent medical history  Stephenie typically gets a yeast infection when she takes antibiotics. She is not sure if she has used fluconazole (Diflucan) to treat previous yeast infections.   Stephenie does not need a return to work/school note.   Weight: 155 lbs   Stephenie does not smoke or use smokeless tobacco.   She denies pregnancy and denies breastfeeding. She has menstruated in the past month.   Weight: 155 lbs    MEDICATIONS: No current medications, ALLERGIES: Cymbalta, sumatriptan  Clinician Response:  Dear Stephenie,   Your symptoms show that you may have coronavirus (COVID-19). This illness can cause fever, cough and trouble breathing. Many people get a mild case and get better on their own. Some people can get very sick.  Based on the symptoms you have shared, I would like you to be re-checked in 2 to 3 days. Please call your family clinic to set up a video or phone visit.  Will I be tested for COVID-19?  We would like to test you for this virus.   Please call [phone] to schedule your visit. Explain that you were referred by [facility] to have a COVID-19 test. Be ready to share your [facility] visit ID number.   * If you need to schedule in [place] or [place] please call [phone] or for [facility]  "[employer] employees please call [phone].    The following will serve as your written order for this COVID Test, ordered by me, for the indication of suspected COVID [Z20.828]: The test will be ordered in [employer], our electronic health record, after you are scheduled. It will show as ordered and authorized by Milind Millan MD.  Order: COVID-19 (Coronavirus) PCR for [facility]TOMATIC [facility] from [facility].   1.When it's time for your COVID test:   Stay at least 6 feet away from others. (If someone will drive you to your test, stay in the backseat, as far away from the  as you can.)   Cover your mouth and nose with a mask, tissue or washcloth.  Go straight to the testing site. Don't make any stops on the way there or back.      2.Starting now: Stay home and away from others (self-isolate) until:   You've had no fever---and no medicine that reduces fever---for one full day (24 hours). And...   Your other symptoms have gotten better. For example, your cough or breathing has improved. And...   At least 10 days have passed since your symptoms started.       During this time, don't leave the house except for testing or medical care.   Stay in your own room, even for meals. Use your own bathroom if you can.   Stay away from others in your home. No hugging, kissing or shaking hands. No visitors.  Don't go to work, school or anywhere else.    Clean \"high touch\" surfaces often (doorknobs, counters, handles, etc.). Use a household cleaning spray or wipes. You'll find a full list of  on the EPA website: www.epa.gov/pesticide-registration/list-n-disinfectants-use-against-sars-cov-2.   Cover your mouth and nose with a mask, tissue or washcloth to avoid spreading germs.  Wash your hands and face often. Use soap and water.  Caregivers in these groups are at risk for severe illness due to COVID-19:  o People 65 years and older  o People who live in a nursing home or long-term care facility  o People with chronic disease (lung, " heart, cancer, diabetes, kidney, liver, immunologic)   o People who have a weakened immune system, including those who:   Are in cancer treatment  Take medicine that weakens the immune system, such as corticosteroids  Had a bone marrow or organ transplant  Have an immune deficiency  Have poorly controlled HIV or AIDS  Are obese (body mass index of 40 or higher)  Smoke regularly   o Caregivers should wear gloves while washing dishes, handling laundry and cleaning bedrooms and bathrooms.  o Use caution when washing and drying laundry: Don't shake dirty laundry, and use the warmest water setting that you can.  o For more tips, go to www.cdc.gov/coronavirus/2019-ncov/downloads/10Things.pdf.      How can I take care of myself?   Get lots of rest. Drink extra fluids (unless a doctor has told you not to)   Take Tylenol (acetaminophen) for fever or pain. If you have liver or kidney problems, ask your family doctor if it's okay to take Tylenol.   Adults can take either:    650 mg (two 325 mg pills) every 4 to 6 hours, or...   1,000 mg (two 500 mg pills) every 8 hours as needed.    Note: Don't take more than 3,000 mg in one day. Acetaminophen is found in many medicines (both prescribed and over-the-counter medicines). Read all labels to be sure you don't take too much.   For children, check the Tylenol bottle for the right dose. The dose is based on the child's age or weight.    If you have other health problems (like cancer, heart failure, an organ transplant or severe kidney disease): Call your specialty clinic if you don't feel better in the next 2 days.       Know when to call 911. Emergency warning signs include:    Trouble breathing or shortness of breath Pain or pressure in the chest that doesn't go away Feeling confused like you haven't felt before, or not being able to wake up Bluish-colored lips or face  Where can I get more information?    [employer] [facility] -- About COVID-19: [email or web address]   CDC  -- What to Do If You're Sick: www.cdc.gov/coronavirus/2019-ncov/about/steps-when-sick.html   CDC -- Ending Home Isolation: www.cdc.gov/coronavirus/2019-ncov/hcp/disposition-in-home-patients.html   [facility] -- Caring for Someone: www.cdc.gov/coronavirus/2019-ncov/if-you-are-sick/care-for-someone.html   [facility] -- Interim Guidance for Hospital Discharge to Home: www.[facility].[place]./diseases/coronavirus/hcp/hospdischarge.pdf   [facility] clinical trials (COVID-19 research studies): clinicalaffairs.[facility]-clinical-trials    Below are the COVID-19 hotlines at the [place] Department of Health ([facility]). Interpreters are available.    For health questions: Call 403-283-8970 or 1-329.612.6232 (7 a.m. to 7 p.m.) For questions about schools and childcare: Call 720-087-4583 or 1-486.728.2235 (7 a.m. to 7 p.m.)       Diagnosis: Cough  Diagnosis ICD: R05

## 2020-11-14 ENCOUNTER — AMBULATORY - HEALTHEAST (OUTPATIENT)
Dept: FAMILY MEDICINE | Facility: CLINIC | Age: 35
End: 2020-11-14

## 2020-11-14 DIAGNOSIS — Z20.822 SUSPECTED COVID-19 VIRUS INFECTION: ICD-10-CM

## 2020-11-15 ENCOUNTER — AMBULATORY - HEALTHEAST (OUTPATIENT)
Dept: FAMILY MEDICINE | Facility: CLINIC | Age: 35
End: 2020-11-15

## 2020-11-15 DIAGNOSIS — Z20.822 SUSPECTED COVID-19 VIRUS INFECTION: ICD-10-CM

## 2020-11-17 ENCOUNTER — COMMUNICATION - HEALTHEAST (OUTPATIENT)
Dept: SCHEDULING | Facility: CLINIC | Age: 35
End: 2020-11-17

## 2020-12-02 ENCOUNTER — OFFICE VISIT - HEALTHEAST (OUTPATIENT)
Dept: FAMILY MEDICINE | Facility: CLINIC | Age: 35
End: 2020-12-02

## 2020-12-02 ENCOUNTER — RECORDS - HEALTHEAST (OUTPATIENT)
Dept: GENERAL RADIOLOGY | Facility: CLINIC | Age: 35
End: 2020-12-02

## 2020-12-02 DIAGNOSIS — G89.29 OTHER CHRONIC PAIN: ICD-10-CM

## 2020-12-02 DIAGNOSIS — M25.562 CHRONIC PAIN OF LEFT KNEE: ICD-10-CM

## 2020-12-02 DIAGNOSIS — G89.29 CHRONIC PAIN OF LEFT KNEE: ICD-10-CM

## 2020-12-02 DIAGNOSIS — M25.562 PAIN IN LEFT KNEE: ICD-10-CM

## 2020-12-02 NOTE — ASSESSMENT & PLAN NOTE
New complaint today, ongoing 2 weeks. Barely able to walk from chair to exam table, clearly in significant pain and limping. Not a candidate for physical therapy with this current severity of pain.  Need diagnostic imaging to evaluate for meniscal tear or other derangement of the knee.   xr knee done. Normal findings. Personally reviewed with the patient.  Mri knee pending  Ortho consult ordered  Knee immobilizer recommended for comfort.

## 2020-12-08 ENCOUNTER — HOSPITAL ENCOUNTER (OUTPATIENT)
Dept: MRI IMAGING | Facility: CLINIC | Age: 35
Discharge: HOME | End: 2020-12-08
Attending: FAMILY MEDICINE

## 2020-12-08 DIAGNOSIS — M25.562 CHRONIC PAIN OF LEFT KNEE: ICD-10-CM

## 2020-12-08 DIAGNOSIS — G89.29 CHRONIC PAIN OF LEFT KNEE: ICD-10-CM

## 2020-12-09 ENCOUNTER — RECORDS - HEALTHEAST (OUTPATIENT)
Dept: ADMINISTRATIVE | Facility: OTHER | Age: 35
End: 2020-12-09

## 2020-12-30 ENCOUNTER — RECORDS - HEALTHEAST (OUTPATIENT)
Dept: ADMINISTRATIVE | Facility: OTHER | Age: 35
End: 2020-12-30

## 2021-01-28 ENCOUNTER — RECORDS - HEALTHEAST (OUTPATIENT)
Dept: ADMINISTRATIVE | Facility: OTHER | Age: 36
End: 2021-01-28

## 2021-03-13 ENCOUNTER — RECORDS - HEALTHEAST (OUTPATIENT)
Dept: ADMINISTRATIVE | Facility: OTHER | Age: 36
End: 2021-03-13

## 2021-03-18 ENCOUNTER — RECORDS - HEALTHEAST (OUTPATIENT)
Dept: ADMINISTRATIVE | Facility: OTHER | Age: 36
End: 2021-03-18

## 2021-05-26 ENCOUNTER — RECORDS - HEALTHEAST (OUTPATIENT)
Dept: ADMINISTRATIVE | Facility: CLINIC | Age: 36
End: 2021-05-26

## 2021-05-26 ENCOUNTER — OFFICE VISIT - HEALTHEAST (OUTPATIENT)
Dept: FAMILY MEDICINE | Facility: CLINIC | Age: 36
End: 2021-05-26

## 2021-05-26 ENCOUNTER — COMMUNICATION - HEALTHEAST (OUTPATIENT)
Dept: FAMILY MEDICINE | Facility: CLINIC | Age: 36
End: 2021-05-26

## 2021-05-26 DIAGNOSIS — M25.562 CHRONIC PAIN OF LEFT KNEE: ICD-10-CM

## 2021-05-26 DIAGNOSIS — E55.9 VITAMIN D DEFICIENCY: ICD-10-CM

## 2021-05-26 DIAGNOSIS — D50.0 IRON DEFICIENCY ANEMIA DUE TO CHRONIC BLOOD LOSS: ICD-10-CM

## 2021-05-26 DIAGNOSIS — M13.0 POLYARTHROPATHY, MULTIPLE SITES: ICD-10-CM

## 2021-05-26 DIAGNOSIS — R87.610 ASCUS OF CERVIX WITH NEGATIVE HIGH RISK HPV: ICD-10-CM

## 2021-05-26 DIAGNOSIS — R68.89 EXERCISE INTOLERANCE: ICD-10-CM

## 2021-05-26 DIAGNOSIS — G89.29 CHRONIC PAIN OF LEFT KNEE: ICD-10-CM

## 2021-05-26 DIAGNOSIS — F33.0 MILD EPISODE OF RECURRENT MAJOR DEPRESSIVE DISORDER (H): ICD-10-CM

## 2021-05-26 DIAGNOSIS — E78.2 MIXED HYPERLIPIDEMIA: ICD-10-CM

## 2021-05-26 DIAGNOSIS — D72.819 LEUKOPENIA, UNSPECIFIED TYPE: ICD-10-CM

## 2021-05-26 DIAGNOSIS — Z13.228 ENCOUNTER FOR SCREENING FOR METABOLIC DISORDER: ICD-10-CM

## 2021-05-26 DIAGNOSIS — M54.2 NECK PAIN: ICD-10-CM

## 2021-05-26 DIAGNOSIS — N64.89 BREAST ASYMMETRY IN FEMALE: ICD-10-CM

## 2021-05-26 DIAGNOSIS — Z86.16 PERSONAL HISTORY OF COVID-19: ICD-10-CM

## 2021-05-26 ASSESSMENT — ANXIETY QUESTIONNAIRES
4. TROUBLE RELAXING: NOT AT ALL
GAD7 TOTAL SCORE: 2
6. BECOMING EASILY ANNOYED OR IRRITABLE: SEVERAL DAYS
1. FEELING NERVOUS, ANXIOUS, OR ON EDGE: SEVERAL DAYS
2. NOT BEING ABLE TO STOP OR CONTROL WORRYING: NOT AT ALL
3. WORRYING TOO MUCH ABOUT DIFFERENT THINGS: NOT AT ALL
IF YOU CHECKED OFF ANY PROBLEMS ON THIS QUESTIONNAIRE, HOW DIFFICULT HAVE THESE PROBLEMS MADE IT FOR YOU TO DO YOUR WORK, TAKE CARE OF THINGS AT HOME, OR GET ALONG WITH OTHER PEOPLE: SOMEWHAT DIFFICULT
7. FEELING AFRAID AS IF SOMETHING AWFUL MIGHT HAPPEN: NOT AT ALL
5. BEING SO RESTLESS THAT IT IS HARD TO SIT STILL: NOT AT ALL

## 2021-05-26 ASSESSMENT — PATIENT HEALTH QUESTIONNAIRE - PHQ9
SUM OF ALL RESPONSES TO PHQ QUESTIONS 1-9: 7
SUM OF ALL RESPONSES TO PHQ QUESTIONS 1-9: 7

## 2021-05-26 ASSESSMENT — MIFFLIN-ST. JEOR: SCORE: 1463.13

## 2021-05-26 NOTE — ASSESSMENT & PLAN NOTE
Sees chiropractor. She is happy with this plan. Physical therapy discussed but declined. Currently not a problem.    She would like some flexeril to have on hand for when her pain flares up.  Flexeril #30 given.

## 2021-05-26 NOTE — ASSESSMENT & PLAN NOTE
Went to ortho. They found a cyst that was growing into the ACL. They also noted fissures and cartilage and other derangement. Ortho wanted to drain the cyst.  So then another ortho said that a cortisone injection was better. Had bad reaction to the cortisone injection.  She is not interested in seeing ortho again. Thinks it is covid related.

## 2021-05-26 NOTE — ASSESSMENT & PLAN NOTE
gets light headed, winded, heart racing talking, or doing regular house holdactivities, sometimes sats down to 93, feels foggy/ confused/ word finding problems/ lost driving, and insomnia since covid    Referral placed to post covid - syndrome   Positive covid was 4/27/2020  Negative antibody test 10/2020    gets light headed, winded, heart racing talking, or doing regular house hold activities, sometimes sats down to 93, feels foggy/ confused/ word finding problems/ lost driving, and insomnia since covid    Cardiac echo also ordered today as well as thyroid

## 2021-05-26 NOTE — ASSESSMENT & PLAN NOTE
Vitamin D, Total (25-Hydroxy)   Date Value Ref Range Status   11/28/2018 55.7 30.0 - 80.0 ng/mL Final      She is intermittently taking vitamin D so wants to recheck.

## 2021-05-26 NOTE — ASSESSMENT & PLAN NOTE
She has seen Dr. Millan - rheumatology back in 2018, they wanted her to come back in 3 months, but she is not interested. Wants naturaltherapies. Will refer to Dr. Mcknight for functional medicine consult.

## 2021-05-26 NOTE — ASSESSMENT & PLAN NOTE
Will continue to monitor. Has been stable.   recheck hgb q 6 months.   Lab Results   Component Value Date    HGB 11.8 (L) 05/21/2020

## 2021-05-26 NOTE — ASSESSMENT & PLAN NOTE
Short of breath telling me her symtpoms, and just getting dressed in the clinic after exam.     Consult covid chronic symptom as this started after covid.     Also will check thyroid, cbc/ iron studies, and cardiac echo.     Follow up in 3 months or once the above has been done.

## 2021-05-27 ENCOUNTER — RECORDS - HEALTHEAST (OUTPATIENT)
Dept: ADMINISTRATIVE | Facility: CLINIC | Age: 36
End: 2021-05-27

## 2021-05-27 VITALS — TEMPERATURE: 98.7 F

## 2021-05-27 ASSESSMENT — PATIENT HEALTH QUESTIONNAIRE - PHQ9: SUM OF ALL RESPONSES TO PHQ QUESTIONS 1-9: 7

## 2021-05-28 ENCOUNTER — RECORDS - HEALTHEAST (OUTPATIENT)
Dept: ADMINISTRATIVE | Facility: CLINIC | Age: 36
End: 2021-05-28

## 2021-05-28 ENCOUNTER — AMBULATORY - HEALTHEAST (OUTPATIENT)
Dept: LAB | Facility: CLINIC | Age: 36
End: 2021-05-28

## 2021-05-28 DIAGNOSIS — D50.0 IRON DEFICIENCY ANEMIA DUE TO CHRONIC BLOOD LOSS: ICD-10-CM

## 2021-05-28 DIAGNOSIS — Z13.228 ENCOUNTER FOR SCREENING FOR METABOLIC DISORDER: ICD-10-CM

## 2021-05-28 DIAGNOSIS — Z86.16 PERSONAL HISTORY OF COVID-19: ICD-10-CM

## 2021-05-28 DIAGNOSIS — E78.2 MIXED HYPERLIPIDEMIA: ICD-10-CM

## 2021-05-28 DIAGNOSIS — E55.9 VITAMIN D DEFICIENCY: ICD-10-CM

## 2021-05-28 DIAGNOSIS — R68.89 EXERCISE INTOLERANCE: ICD-10-CM

## 2021-05-28 LAB
ALBUMIN SERPL-MCNC: 4.5 G/DL (ref 3.5–5)
ALP SERPL-CCNC: 56 U/L (ref 45–120)
ALT SERPL W P-5'-P-CCNC: 14 U/L (ref 0–45)
ANION GAP SERPL CALCULATED.3IONS-SCNC: 12 MMOL/L (ref 5–18)
AST SERPL W P-5'-P-CCNC: 16 U/L (ref 0–40)
BASOPHILS # BLD AUTO: 0 THOU/UL (ref 0–0.2)
BASOPHILS NFR BLD AUTO: 0 % (ref 0–2)
BILIRUB SERPL-MCNC: 0.5 MG/DL (ref 0–1)
BUN SERPL-MCNC: 13 MG/DL (ref 8–22)
CALCIUM SERPL-MCNC: 9.5 MG/DL (ref 8.5–10.5)
CHLORIDE BLD-SCNC: 102 MMOL/L (ref 98–107)
CHOLEST SERPL-MCNC: 234 MG/DL
CO2 SERPL-SCNC: 24 MMOL/L (ref 22–31)
CREAT SERPL-MCNC: 0.82 MG/DL (ref 0.6–1.1)
EOSINOPHIL # BLD AUTO: 0.1 THOU/UL (ref 0–0.4)
EOSINOPHIL NFR BLD AUTO: 2 % (ref 0–6)
ERYTHROCYTE [DISTWIDTH] IN BLOOD BY AUTOMATED COUNT: 14.4 % (ref 11–14.5)
FASTING STATUS PATIENT QL REPORTED: YES
FERRITIN SERPL-MCNC: 5 NG/ML (ref 10–130)
GFR SERPL CREATININE-BSD FRML MDRD: >60 ML/MIN/1.73M2
GLUCOSE BLD-MCNC: 93 MG/DL (ref 70–125)
HCT VFR BLD AUTO: 38.2 % (ref 35–47)
HDLC SERPL-MCNC: 66 MG/DL
HGB BLD-MCNC: 12.2 G/DL (ref 12–16)
IMM GRANULOCYTES # BLD: 0 THOU/UL
IMM GRANULOCYTES NFR BLD: 0 %
IRON SATN MFR SERPL: 23 % (ref 20–50)
IRON SERPL-MCNC: 101 UG/DL (ref 42–175)
LDLC SERPL CALC-MCNC: 149 MG/DL
LYMPHOCYTES # BLD AUTO: 1.3 THOU/UL (ref 0.8–4.4)
LYMPHOCYTES NFR BLD AUTO: 28 % (ref 20–40)
MCH RBC QN AUTO: 25.1 PG (ref 27–34)
MCHC RBC AUTO-ENTMCNC: 31.9 G/DL (ref 32–36)
MCV RBC AUTO: 79 FL (ref 80–100)
MONOCYTES # BLD AUTO: 0.4 THOU/UL (ref 0–0.9)
MONOCYTES NFR BLD AUTO: 7 % (ref 2–10)
NEUTROPHILS # BLD AUTO: 2.9 THOU/UL (ref 2–7.7)
NEUTROPHILS NFR BLD AUTO: 62 % (ref 50–70)
PLATELET # BLD AUTO: 254 THOU/UL (ref 140–440)
PMV BLD AUTO: 9.7 FL (ref 7–10)
POTASSIUM BLD-SCNC: 4.5 MMOL/L (ref 3.5–5)
PROT SERPL-MCNC: 7.3 G/DL (ref 6–8)
RBC # BLD AUTO: 4.86 MILL/UL (ref 3.8–5.4)
SODIUM SERPL-SCNC: 138 MMOL/L (ref 136–145)
TIBC SERPL-MCNC: 439 UG/DL (ref 313–563)
TRANSFERRIN SERPL-MCNC: 351 MG/DL (ref 212–360)
TRIGL SERPL-MCNC: 94 MG/DL
TSH SERPL DL<=0.005 MIU/L-ACNC: 1.53 UIU/ML (ref 0.3–5)
WBC: 4.7 THOU/UL (ref 4–11)

## 2021-05-29 ENCOUNTER — RECORDS - HEALTHEAST (OUTPATIENT)
Dept: ADMINISTRATIVE | Facility: CLINIC | Age: 36
End: 2021-05-29

## 2021-05-30 VITALS — HEIGHT: 67 IN | WEIGHT: 147 LBS | BODY MASS INDEX: 23.07 KG/M2

## 2021-05-30 VITALS — BODY MASS INDEX: 22.82 KG/M2 | WEIGHT: 145.7 LBS

## 2021-05-30 VITALS — BODY MASS INDEX: 23.27 KG/M2 | WEIGHT: 148.6 LBS

## 2021-05-31 ENCOUNTER — RECORDS - HEALTHEAST (OUTPATIENT)
Dept: ADMINISTRATIVE | Facility: CLINIC | Age: 36
End: 2021-05-31

## 2021-05-31 VITALS — HEIGHT: 67 IN | WEIGHT: 138 LBS | BODY MASS INDEX: 21.66 KG/M2

## 2021-05-31 VITALS — HEIGHT: 67 IN | BODY MASS INDEX: 23.1 KG/M2 | WEIGHT: 147.2 LBS

## 2021-05-31 VITALS — WEIGHT: 142.4 LBS | HEIGHT: 67 IN | BODY MASS INDEX: 22.35 KG/M2

## 2021-06-01 ENCOUNTER — COMMUNICATION - HEALTHEAST (OUTPATIENT)
Dept: FAMILY MEDICINE | Facility: CLINIC | Age: 36
End: 2021-06-01

## 2021-06-01 ENCOUNTER — COMMUNICATION - HEALTHEAST (OUTPATIENT)
Dept: SCHEDULING | Facility: CLINIC | Age: 36
End: 2021-06-01

## 2021-06-01 VITALS — WEIGHT: 148 LBS | HEIGHT: 67 IN | BODY MASS INDEX: 23.23 KG/M2

## 2021-06-01 VITALS — WEIGHT: 144.5 LBS | BODY MASS INDEX: 22.63 KG/M2

## 2021-06-01 LAB
25(OH)D3 SERPL-MCNC: 22.2 NG/ML (ref 30–80)
25(OH)D3 SERPL-MCNC: 22.2 NG/ML (ref 30–80)

## 2021-06-01 NOTE — PROGRESS NOTES
ASSESSMENT AND PLAN:      Problem List Items Addressed This Visit        High    Neck pain - Primary     NEW PROBLEM  She has not had neck pain since 2017, but fell 2.5 weeks ago on Sunday.  Since then she has had bad pain that has persisted and it is limiting her range of motion and activities. Xray today was normal and was reviewed personally and later by radiology    Because pain is out of proportion to exam, I would like to get an MRI neck to rule out occult fx, although it seems unlikely she would have been able to put up with such a serious injury for 2.5 weeks before seeking medical attention, but still I would like to be sure we are not missing fx. I think it is more likely that she may have arthropathy (as she does have baseline arthritis) in her neck that has flared up with the trauma from her fall.     Recommend ibuprofen 800mg po three times a day and acetaminophen up to 4000 mg po per 24 hours   Flexeril and norco given to use for break through pain sparingly. She knows these are potentially addictive. She will follow up for any needed refills. She is cautioned not to use more than 4000 mg acetaminophen daily and she knows acetaminophen is in the norco    Follow up in 1 week if pain persists, and sooner if worsening.         Relevant Medications    cyclobenzaprine (FLEXERIL) 5 MG tablet    HYDROcodone-acetaminophen 5-325 mg per tablet    Other Relevant Orders    XR Cervical Spine 4 - 5 VWS (Completed)    MR Cervical Spine Without Contrast           Chief Complaint   Patient presents with     Fall     Patient fell on her stairs a couple weeks ago, landed on her spine. States she couldn't move her neck at all at first - got slightly better.  But is now getting worse again.         HPI  Stephenie Menendez is a 33 y.o. female comes in because she slipped on her stairs.  They are carpeted stairs and she was wearing socks.  Her legs flew out from under her and she landed on her spine.  Initially she had pain  "in her lower spine as well as in her neck and it was so bad that she could not move her neck at first.  She says she is a \"watch and wait\" type of person so she took ibuprofen 800 mg 3-4 times a day and iced and used heat which all seem to help.  The spot on her lower spine where she landed seems to be healing up just fine but she continues to have pain in her neck.  This happened 2-1/2 weeks ago on a Sunday.  This past weekend it seemed like it got worse.  She said she noticed swelling over the weekend which was so bad even her  noticed it was swollen.  She also could not tip her head back to drink water.  She says she is sleeping okay but does wake up a few times at night to adjust her pillow.  Her pain in her neck is 5-8/10.  She describes the pain as a tightness and a sharp pain.  Sometimes she gets pain and tingling into her arms.  She has had no symptoms in her legs and no bowel or bladder dysfunction.    Social History     Tobacco Use   Smoking Status Never Smoker   Smokeless Tobacco Never Used      Current Outpatient Medications on File Prior to Visit   Medication Sig Dispense Refill     buPROPion (WELLBUTRIN XL) 150 MG 24 hr tablet Take 1 tablet (150 mg total) by mouth every morning. 90 tablet 3     cholecalciferol, vitamin D3, 10,000 unit capsule Take 2,000 Units by mouth daily .             No current facility-administered medications on file prior to visit.       Allergies   Allergen Reactions     Sumatriptan      Cymbalta [Duloxetine] Rash     THROAT CONSTRICTION         Review of Systems   Constitutional: Negative.    HENT: Negative.    Eyes: Negative.    Respiratory: Negative.    Cardiovascular: Negative.    Gastrointestinal: Negative.    Endocrine: Negative.    Genitourinary: Negative.    Musculoskeletal: Negative.    Skin: Negative.    Neurological: Negative.         Transient very brief (seconds only) tingling in arms and hands noted after fall.    Hematological: Negative.  " "  Psychiatric/Behavioral: Negative.         OBJECTIVE: /66   Pulse 76   Resp 14   Ht 5' 6.5\" (1.689 m)   Wt 152 lb (68.9 kg)   BMI 24.17 kg/m     Physical Exam  Constitutional:       General: She is not in acute distress.     Appearance: She is well-developed.   HENT:      Head: Normocephalic and atraumatic.   Eyes:      Conjunctiva/sclera: Conjunctivae normal.   Neck:      Musculoskeletal: Neck supple.   Cardiovascular:      Rate and Rhythm: Normal rate and regular rhythm.   Pulmonary:      Effort: Pulmonary effort is normal.   Musculoskeletal:      Cervical back: She exhibits decreased range of motion (flexion nand extension are painful, so she wants to avoid, but she is able to do these for xrays.), tenderness (minimal muscular tenderness and point tenderness was noted over c5/c6), bony tenderness (over c5/c6) and pain (she is holding her neck in a stiff fashion, clearly uncomfortable. ). She exhibits no edema, no deformity, no laceration, no spasm and normal pulse.      Right upper arm: Normal. She exhibits no tenderness, no bony tenderness, no swelling, no edema, no deformity and no laceration.      Left upper arm: Normal. She exhibits no tenderness, no bony tenderness, no swelling, no edema, no deformity and no laceration.      Right hand: Normal. She exhibits normal range of motion and no tenderness. Normal strength noted.      Left hand: Normal. She exhibits normal range of motion. Normal strength noted. She exhibits no thumb/finger opposition.      Comments: Normal gait noted    Skin:     General: Skin is warm and dry.   Neurological:      Mental Status: She is alert and oriented to person, place, and time.        xr reviewed personally by me, no fx seen. c spine looks unremarkable. No fracture. No abnormality to correspond to the c5/6 area.    Additional History from Old Records Summarized (2): no  Decision to Obtain Records (1): no  Radiology Tests Summarized or Ordered (1): yes  Labs Reviewed " or Ordered (1): no  Medicine Test Summarized or Ordered (1): yes  Independent Review of EKG or X-RAY(2 each): yes    This note was created using Dragon dictation.  Please excuse any grammatical errors.

## 2021-06-02 ENCOUNTER — AMBULATORY - HEALTHEAST (OUTPATIENT)
Dept: NURSING | Facility: CLINIC | Age: 36
End: 2021-06-02

## 2021-06-02 VITALS — WEIGHT: 149.3 LBS | BODY MASS INDEX: 23.38 KG/M2

## 2021-06-02 VITALS — WEIGHT: 146 LBS | BODY MASS INDEX: 22.91 KG/M2 | HEIGHT: 67 IN

## 2021-06-02 LAB
BKR LAB AP ABNORMAL BLEEDING: NO
BKR LAB AP BIRTH CONTROL/HORMONES: ABNORMAL
BKR LAB AP CERVICAL APPEARANCE: ABNORMAL
BKR LAB AP GYN ADEQUACY: ABNORMAL
BKR LAB AP GYN INTERPRETATION: ABNORMAL
BKR LAB AP HPV REFLEX: ABNORMAL
BKR LAB AP LMP: ABNORMAL
BKR LAB AP PATIENT STATUS: ABNORMAL
BKR LAB AP PREVIOUS ABNORMAL: ABNORMAL
BKR LAB AP PREVIOUS NORMAL: ABNORMAL
HIGH RISK?: NO
HPV SOURCE: NORMAL
HUMAN PAPILLOMA VIRUS 16 DNA: NEGATIVE
HUMAN PAPILLOMA VIRUS 18 DNA: NEGATIVE
HUMAN PAPILLOMA VIRUS FINAL DIAGNOSIS: NORMAL
HUMAN PAPILLOMA VIRUS OTHER HR: NEGATIVE
PATH REPORT.COMMENTS IMP SPEC: ABNORMAL
RESULT FLAG (HE HISTORICAL CONVERSION): ABNORMAL
SPECIMEN DESCRIPTION: NORMAL

## 2021-06-02 NOTE — TELEPHONE ENCOUNTER
Radiology schedulers spoke with patient yesterday and she told them that she does not want to have the MRI done. So the order has been closed, but if patient should change her mind its still there to schedule from.

## 2021-06-03 VITALS
WEIGHT: 152 LBS | SYSTOLIC BLOOD PRESSURE: 108 MMHG | HEART RATE: 76 BPM | DIASTOLIC BLOOD PRESSURE: 66 MMHG | HEIGHT: 67 IN | BODY MASS INDEX: 23.86 KG/M2 | RESPIRATION RATE: 14 BRPM

## 2021-06-04 ENCOUNTER — COMMUNICATION - HEALTHEAST (OUTPATIENT)
Dept: OBGYN | Facility: CLINIC | Age: 36
End: 2021-06-04

## 2021-06-04 VITALS
RESPIRATION RATE: 18 BRPM | HEART RATE: 76 BPM | TEMPERATURE: 98.7 F | BODY MASS INDEX: 24.86 KG/M2 | HEIGHT: 67 IN | DIASTOLIC BLOOD PRESSURE: 86 MMHG | OXYGEN SATURATION: 100 % | SYSTOLIC BLOOD PRESSURE: 139 MMHG | WEIGHT: 158.4 LBS

## 2021-06-04 DIAGNOSIS — R87.610 ASCUS OF CERVIX WITH NEGATIVE HIGH RISK HPV: ICD-10-CM

## 2021-06-05 VITALS — DIASTOLIC BLOOD PRESSURE: 82 MMHG | RESPIRATION RATE: 16 BRPM | SYSTOLIC BLOOD PRESSURE: 128 MMHG | HEART RATE: 80 BPM

## 2021-06-07 NOTE — PROGRESS NOTES
ASSESSMENT AND PLAN:   See separate note attached to this visit regarding details of video.   Problem List Items Addressed This Visit        Unprioritized    Suspected 2019 Novel Coronavirus Infection - Primary     Illness began 3/29/20. She does not qualify for testing for COVID19 but has presumed disease.     She is offered in office visit to get more testing including cxr, cbc, cmp, o2 sats etc, but the patient declines saying she is not having trouble breathing, but the cough is just really bothersome.     Recommend trial of codeine cough syrup (use sparingly discussed to avoid addiction).     Also albuterol neb machine is available in her home.  She is given tubing and albuterol solution so she can try and see if this helps. She is asked to isolate if using neb machine so others are not exposed to potential aerosolized virus.    Plan follow up in clinic if not improving.          Relevant Medications    codeine-guaiFENesin (GUAIFENESIN AC)  mg/5 mL liquid    albuterol (ACCUNEB) 0.63 mg/3 mL nebulizer solution    Other Relevant Orders    Nebulizer with supplies (cup, tubing, mask, & filters)           Chief Complaint   Patient presents with     Sore Throat     COVID concerns, sick since March 29th, fever, sore throat, chills, body aches, cough, fever for 6 days while taking tylenol.  Fever does come back about once a day.        HPI  Stephenie Menendez is a 34 y.o. female tells me that she has presumed covid19 starting March 29, 2020 she had fever, sore throat, body aches, chills, and fatigue. And was screened by [email or web address] and told that she had probable covid.  At this point she has been sick for 25 days.  She is not getting better.  She continues to get fevers up to 99.5 to 100. And now she has started coughing.  she is not bringing anything up with the cough.  No sinuses. No throat pain. She is having shivering and sweats. Over the weekend she developed chest tightness and heart racing.      She also  notes a 5mm lesion on her inner cheek in the buccal mucosa next to her top left teeth with tenderness.  She also had a nose bleed this weekend.     Social History     Tobacco Use   Smoking Status Never Smoker   Smokeless Tobacco Never Used      Current Outpatient Medications on File Prior to Visit   Medication Sig Dispense Refill     buPROPion (WELLBUTRIN XL) 150 MG 24 hr tablet Take 1 tablet (150 mg total) by mouth every morning. 90 tablet 3     busPIRone (BUSPAR) 10 MG tablet Take 10 mg by mouth.       cholecalciferol, vitamin D3, 10,000 unit capsule Take 2,000 Units by mouth daily .             cyclobenzaprine (FLEXERIL) 5 MG tablet Take 1-2 tablets (5-10 mg total) by mouth every 8 (eight) hours as needed for muscle spasms. 30 tablet 0     HYDROcodone-acetaminophen 5-325 mg per tablet Take 1-2 tablets by mouth every 4 (four) hours as needed for pain. 18 tablet 0     No current facility-administered medications on file prior to visit.       Allergies   Allergen Reactions     Sumatriptan      Cymbalta [Duloxetine] Rash     THROAT CONSTRICTION       Review of Systems   Constitutional: Positive for activity change (decreased activity), fatigue and fever (t max 99 - 100). Negative for chills and diaphoresis.   HENT: Negative.  Negative for congestion, postnasal drip, rhinorrhea, sinus pressure and sinus pain.    Eyes: Negative.    Respiratory: Positive for cough, chest tightness and wheezing (very mild wheezing a few times, but not a prominent symptom).    Cardiovascular: Positive for palpitations.   Gastrointestinal: Negative.    Endocrine: Negative.    Genitourinary: Negative.    Musculoskeletal: Negative.  Negative for myalgias (she had this initially but not anymore.).   Skin: Negative.    Neurological: Negative.  Negative for syncope.   Hematological: Negative.    Psychiatric/Behavioral: Negative.         OBJECTIVE: Temp 98.7  F (37.1  C) (Oral)    Physical Exam  Constitutional:       General: She is not in acute  distress.     Appearance: Normal appearance. She is well-developed. She is not ill-appearing, toxic-appearing or diaphoretic.      Comments: At home in bathrobe.   HENT:      Head: Normocephalic and atraumatic.   Eyes:      Conjunctiva/sclera: Conjunctivae normal.   Neck:      Musculoskeletal: Neck supple.   Pulmonary:      Effort: Pulmonary effort is normal.      Comments: Dry cough noted intermittently  Musculoskeletal: Normal range of motion.   Neurological:      Mental Status: She is alert and oriented to person, place, and time.   Psychiatric:         Mood and Affect: Mood normal.         Behavior: Behavior normal.         Thought Content: Thought content normal.         Judgment: Judgment normal.         Additional History from Old Records Summarized (2): no  Decision to Obtain Records (1): no   Radiology Tests Summarized or Ordered (1): no  Labs Reviewed or Ordered (1): no  Medicine Test Summarized or Ordered (1): yes  Independent Review of EKG or X-RAY(2 each): no    This note was created using Dragon dictation.  Please excuse any grammatical errors.

## 2021-06-07 NOTE — PROGRESS NOTES
"Stephenie Menendez is a 34 y.o. female who is being evaluated via a billable video visit.      The patient has been notified of following:     \"This video visit will be conducted via a call between you and your physician/provider. We have found that certain health care needs can be provided without the need for an in-person physical exam.  This service lets us provide the care you need with a video conversation.  If a prescription is necessary we can send it directly to your pharmacy.  If lab work is needed we can place an order for that and you can then stop by our lab to have the test done at a later time.    Video visits are billed at different rates depending on your insurance coverage. Please reach out to your insurance provider with any questions.    If during the course of the call the physician/provider feels a video visit is not appropriate, you will not be charged for this service.\"    Patient has given verbal consent to a Video visit? Yes    Patient would like to receive their AVS by AVS Preference: Kayal.    Patient would like the video invitation sent by: Send to e-mail at: [email or web address]    Will anyone else be joining your video visit? No      Video Start Time: 9:46 AM    Additional provider notes: see separate notes attached to this visit      Video-Visit Details    Type of service:  Video Visit    Video End Time (time video stopped): 10:11 AM  Originating Location (pt. Location): Home    Distant Location (provider location):  [facility]     Mode of Communication:  Video Conference via Xander Matthews MD    "

## 2021-06-07 NOTE — TELEPHONE ENCOUNTER
Called and got video visit scheduled for tomorrow.   Leigh Ann Ta CMA 4/21/2020 2:09 PM   Dominik PARDO

## 2021-06-08 NOTE — PROGRESS NOTES
ASSESSMENT/ PLAN        Stephenie was seen today for follow-up.    2019 novel coronavirus disease (COVID-19)  -     XR Chest 2 Views  -     HM1(CBC and Differential)  -     Electrocardiogram Perform and Read  -     Basic Metabolic Panel  -     Hepatic Profile  -     C-Reactive Protein(CRP)  -     CK Total  -     D-dimer, Quantitative  -     Thyroid Cascade  -     HM1 (CBC with Diff)  -     ZOEY Monitor Hook-Up; Future    Bruising  -     XR Chest 2 Views  -     HM1(CBC and Differential)  -     Electrocardiogram Perform and Read  -     Basic Metabolic Panel  -     Hepatic Profile  -     C-Reactive Protein(CRP)  -     CK Total  -     D-dimer, Quantitative  -     Thyroid Cascade  -     HM1 (CBC with Diff)  -     ZOEY Monitor Hook-Up; Future    Cough  -     XR Chest 2 Views  -     HM1(CBC and Differential)  -     Electrocardiogram Perform and Read  -     Basic Metabolic Panel  -     Hepatic Profile  -     C-Reactive Protein(CRP)  -     CK Total  -     D-dimer, Quantitative  -     Thyroid Cascade  -     HM1 (CBC with Diff)  -     ZOEY Monitor Hook-Up; Future    SOB (shortness of breath)  -     XR Chest 2 Views  -     HM1(CBC and Differential)  -     Electrocardiogram Perform and Read  -     Basic Metabolic Panel  -     Hepatic Profile  -     C-Reactive Protein(CRP)  -     CK Total  -     D-dimer, Quantitative  -     Thyroid Cascade  -     HM1 (CBC with Diff)  -     ZOEY Monitor Hook-Up; Future    Diarrhea, unspecified type  -     XR Chest 2 Views  -     HM1(CBC and Differential)  -     Electrocardiogram Perform and Read  -     Basic Metabolic Panel  -     Hepatic Profile  -     C-Reactive Protein(CRP)  -     CK Total  -     D-dimer, Quantitative  -     Thyroid Cascade  -     HM1 (CBC with Diff)  -     ZOEY Monitor Hook-Up; Future    Body aches  -     XR Chest 2 Views  -     HM1(CBC and Differential)  -     Electrocardiogram Perform and Read  -     Basic Metabolic Panel  -     Hepatic Profile  -     C-Reactive Protein(CRP)  -      CK Total  -     D-dimer, Quantitative  -     Thyroid Cascade  -     HM1 (CBC with Diff)  -     Urinalysis-UC if Indicated  -     ZOEY Monitor Hook-Up; Future    Abdominal pain, generalized  -     XR Chest 2 Views  -     HM1(CBC and Differential)  -     Electrocardiogram Perform and Read  -     Basic Metabolic Panel  -     Hepatic Profile  -     C-Reactive Protein(CRP)  -     CK Total  -     D-dimer, Quantitative  -     Thyroid Cascade  -     HM1 (CBC with Diff)  -     Urinalysis-UC if Indicated  -     ZOEY Monitor Hook-Up; Future    Heart palpitations  -     XR Chest 2 Views  -     HM1(CBC and Differential)  -     Electrocardiogram Perform and Read  -     Basic Metabolic Panel  -     Hepatic Profile  -     C-Reactive Protein(CRP)  -     CK Total  -     D-dimer, Quantitative  -     Thyroid Cascade  -     HM1 (CBC with Diff)  -     ZOEY Monitor Hook-Up; Future  -     metoprolol tartrate (LOPRESSOR) 25 MG tablet; Take 0.5 tablets (12.5 mg total) by mouth as needed.      PostCOVID viral syndrome. Completely thorough work up today. EKG normal. Will get event monitor. Discussed metoprolol intermittently for palpitations ('s per her pulse ox at home) and will trial her for her symptoms. Continue with albuterol inhaler as needed. CXR normal. Await other lab test. No indication for emergent eval at ER at this time.     Greater than 45 minutes was spent today with patient ,more than 50% of time was counseling and coordination of care on the above issues      This note was created using Dragon dictation software, spelling errors may occur.     Nickie Judge MD        SUBJECTIVE   Stephenie Menendez is a 34 y.o. old female who presented to clinic today for further evaluation of ongoing symptoms for about 2 months.       Called patient over the phone before her appointment time to screen her to see whether this appointment is absolutely necessary.   Over the phone, patient reports even more run down, still having daily  temp elevation, highest is 99.6F enough to give her ache, headache and has to be in bed, intermittent cough to the point of choking and gagging, struggling with shortness of breath. Took a shower this morning and did a load of laundry and she was so shortness of breath that she had to lie down, headaches every day. Yesterday was the worst days. Swelling in feet and hands that are also intermittent. All of her symptoms come in waves, ok in the morning but by afternoon these symptoms flare up and she's in best for the rest of the day. Feeling lousy. Voice hoarseness. Very sore throat that she cannot swallow and throat shows blisters in back of throat and on her tonsils. Having diarrhea and abdominal pain. Very sharp abdominal pain. GI symptoms occur a couple of days. Denies rash. Did have a nose bleed once and have non blanching small purple and green cooling bruises up and down legs (no explanation for this as she's had no trauma). Appetite is normal however. No vomiting. Denies pain with urination, dysuria (has had history of UTIs and denies current UTI symptoms). Was given albuterol inhaler by her PCP on 4/22/2020 and this helps her tremendously. She doesn't use inhaler often because it makes her jittery and increasing heart rate.   She takes wellbutrin and buspar. Very healthy otherwise. Was told she had an innocent heart murmur when she was a child.     Symptoms since 3/29/2020.     Given all of these symptoms and my conversation over the phone with her, will have her come into do to at least EKG, CXR, CBC with diff, CMP    N/A    Review of Systems:  Complete ROS done and positive noted above.       The following portions of the patient's history were reviewed and updated as appropriate: past medical history, past surgical history, family history, allergies, current medications and problem list.    Medical History  Active Ambulatory (Non-Hospital) Problems    Diagnosis     Suspected 2019 Novel Coronavirus  "Infection     Leukopenia     ASCUS of cervix with negative high risk HPV     Mixed hyperlipidemia     Breast asymmetry in female     Preventative health care     Neck pain     Polyarthropathy, multiple sites     Vitamin D deficiency     Anemia     Major Depression, Recurrent     Past Medical History:   Diagnosis Date     Anemia      Depression        Surgical History  She  has a past surgical history that includes pr  delivery only and Tubal ligation.    Social History  Reviewed, and she  reports that she has never smoked. She has never used smokeless tobacco. She reports current alcohol use. She reports that she does not use drugs.    Family History  Reviewed, and family history includes ADD / ADHD in her son; Autism in her daughter and son; Cancer in her paternal grandmother; Colon cancer in her maternal grandmother; Depression in her brother and maternal grandmother; Diabetes in her maternal grandmother; Heart disease in her maternal grandmother; Hyperlipidemia in her mother; Hypertension in her father, maternal grandfather, maternal grandmother, mother, paternal grandfather, and paternal grandmother; Lung cancer in her maternal grandmother.    Medications  Reviewed and reconciled    Allergies  Allergies   Allergen Reactions     Sumatriptan      Cymbalta [Duloxetine] Rash     THROAT CONSTRICTION         OBJECTIVE  Physical Exam:  Vital signs: /86 (Patient Site: Left Arm, Patient Position: Sitting, Cuff Size: Adult Regular)   Pulse 76   Temp 98.7  F (37.1  C) (Oral)   Resp 18   Ht 5' 6.5\" (1.689 m)   Wt 158 lb 6.4 oz (71.8 kg)   LMP 2020   BMI 25.18 kg/m    Weight: 158 lb 6.4 oz (71.8 kg)    General appearance: pleasant, appears stated age, cooperative and in no distress  Eyes: EOMs intact, PERRL, conjunctivae normal.   ENT: moist oral mucosa, posterior oropharynx normal. Thyroid normal to palpation, no lump or mass or tenderness, no carotid bruit or JVD appreciated.   Lymph: no " cervical/supraclavicular adenopathy  Respiratory: clear to auscultation bilaterally, good air movement throughout, no wheezing or crackles, speaking full sentences without difficulty  Cardiovascular: regular rate and rhythm, no murmur appreciated, no leg edema  Abdomen: active bowel sounds, soft, non-tender, non-distended  Musculoskeletal: warm and well perfused, strong and symmetric dorsalis pedal pulses, strength 5/5 and equal bilaterally, no calf pain with palpation.   Skin: no rashes  Neuro: alert oriented x 3, grossly normal otherwise, romberg negative, pin prink normal, no clonus, sensation intact to light touch and pin prink.   Psych: normal affect, appropriate conversation

## 2021-06-08 NOTE — TELEPHONE ENCOUNTER
Same Date/Next Day Appt Request  What is the reason for your visit?: Per Doctor wants to double book patient today at 3pm. Unable to schedule on my end thru epic- Please place patient on today schedule for 3pm per doctors request and call her back to confirm     Provider Preference: PCP only    How soon do you need to be seen?: today    Waitlist offered?: No    Okay to leave a detailed message:  No

## 2021-06-09 NOTE — PROGRESS NOTES
ASSESSMENT AND PLAN:   We spent 40 minutes today in direct patient contact, 100% of the time in consultation concerning medical problems as listed below.     Problem List Items Addressed This Visit     Major Depression, Recurrent     celexa working, but she wants to see if she can find something better. Because she is complaining of diffuse body pains and cannot specifically tell me any one place that hurts I recommended cymbalta which helps with chronic pain. Will dc celexa 20 mg po q day and start cymbalta 30 mg po q day.    Counseling through the [facility] to help her manage chronic pain and fatigue offered, but she declined for now.          Relevant Medications    DULoxetine (CYMBALTA) 30 MG capsule    Fatigue     Will try switching from celexa 20 mg to cymbalta 30 mg, if tolerated after a week she can try increasing to 60 mg. Follow up in clinic in 2-8 weeks.          Anemia     Cbc repeated today.         Vitamin D deficiency     Recheck offered today, but declined.          Bilateral hand pain     She noticed some chronic pain diffusely in her body and fatigue starting approximately a year ago.  I am starting to think she may have a chronic pain syndrome. I am switching her from celexa to cymbalta today and planning to titrate up to 60 mg of that.  However, I would like her to see rheumatologist as well to see if there could be some rheumatoid arthritis or something I am missing. Arthritis work up labs ordered today.         Relevant Orders    Ambulatory referral to Rheumatology    CCP Antibodies    C-Reactive Protein (Completed)    HM1(CBC and Differential) (Completed)    Rheumatoid Factor Quant (Completed)    Erythrocyte Sedimentation Rate (Completed)    HM1 (CBC with Diff) (Completed)    UTI (urinary tract infection)     Treated with bactrim. uc pending. Possible early right nephritis as she does have some discomfort in the right flank and right lower abdomen.  I did discuss with her that her appendix  is on the right in the lower quadrant and she understands that she should let me know if she is not improving as expected over the next few days.          Relevant Medications    sulfamethoxazole-trimethoprim (BACTRIM DS) 800-160 mg per tablet      Other Visit Diagnoses     Dysuria    -  Primary    Relevant Orders    Urinalysis-UC if Indicated (Completed)    Culture, Urine    Major Depression, Recurrent        Relevant Medications    DULoxetine (CYMBALTA) 30 MG capsule           Chief Complaint   Patient presents with     Urinary Tract Infection     Symptoms started 3 days ago     Medication Management     Needs refills       HPI  Stephenie Menendez is a 31 y.o. female comes in for refill of celexa and she happened to also start to have some urinary tract infection type sx (frequency being of those sx) noted over the past 3 days or so.     Wants refills of celexa and she thinks she might have a uti today.     She says her fatigue that started a little over a year ago is still present and that she continues to have diffuse body aches and pains. She would love to see if there is anytthing more we can do to improve this.     She eats very healthy, tries to exercise and still she is having ongoing sx.       History   Smoking Status     Never Smoker   Smokeless Tobacco     Never Used     Current Outpatient Prescriptions   Medication Sig Dispense Refill     citalopram (CELEXA) 10 MG tablet TAKE 1 TABLET (10 MG TOTAL) BY MOUTH DAILY. 30 tablet 0     No current facility-administered medications for this visit.        Allergies   Allergen Reactions     Sumatriptan      Review of Systems   Constitutional: Positive for activity change (feels like she is more tired than she was 2 years ago and that she cannot do as much activity as she could back then.) and fatigue (noted for a little over a year.). Negative for appetite change, chills, fever and unexpected weight change.   HENT: Negative.    Eyes: Negative.    Respiratory:  "Negative.    Cardiovascular: Negative.    Gastrointestinal: Negative.    Endocrine: Negative.    Genitourinary: Positive for dysuria, flank pain (on the right), frequency, hematuria and urgency. Negative for decreased urine volume, difficulty urinating, dyspareunia, enuresis, genital sores, menstrual problem, pelvic pain, vaginal bleeding, vaginal discharge and vaginal pain.   Musculoskeletal: Positive for arthralgias (diffuse joint pains) and myalgias (diffuse body aches ongoing over a year.). Negative for back pain, gait problem, joint swelling, neck pain and neck stiffness.   Skin: Negative.    Allergic/Immunologic: Negative.    Neurological: Negative.    Hematological: Negative.    Psychiatric/Behavioral: Negative for agitation, behavioral problems, confusion, decreased concentration, dysphoric mood, hallucinations, self-injury, sleep disturbance and suicidal ideas. The patient is not nervous/anxious and is not hyperactive.         OBJECTIVE: /62  Pulse 84  Temp 98.9  F (37.2  C) (Oral)   Resp 16  Ht 5' 7\" (1.702 m)  Wt 147 lb (66.7 kg)  LMP 03/07/2017 (Exact Date)  Breastfeeding? No  BMI 23.02 kg/m2  Physical Exam   Constitutional: She is oriented to person, place, and time. She appears well-developed and well-nourished.   HENT:   Head: Normocephalic and atraumatic.   Right Ear: External ear normal.   Left Ear: External ear normal.   Nose: Nose normal.   Mouth/Throat: Oropharynx is clear and moist.   Eyes: Conjunctivae are normal.   Neck: Neck supple. No thyromegaly present.   Cardiovascular: Normal rate, regular rhythm and normal heart sounds.    Pulmonary/Chest: Effort normal and breath sounds normal.   Abdominal: Soft. Normal appearance and bowel sounds are normal. She exhibits no shifting dullness, no distension, no pulsatile liver, no fluid wave, no abdominal bruit, no ascites, no pulsatile midline mass and no mass. There is no hepatosplenomegaly, splenomegaly or hepatomegaly. There is " tenderness in the right lower quadrant and suprapubic area. There is CVA tenderness (right cva tenderness noted. ). There is no rigidity, no rebound, no guarding, no tenderness at McBurney's point and negative Machado's sign. No hernia. Hernia confirmed negative in the ventral area, confirmed negative in the right inguinal area and confirmed negative in the left inguinal area.   Musculoskeletal: Normal range of motion.   Lymphadenopathy:     She has no cervical adenopathy.   Neurological: She is alert and oriented to person, place, and time.   Skin: Skin is warm and dry.   Psychiatric: She has a normal mood and affect.        ua concerning for uti.  uc pending

## 2021-06-10 NOTE — PROGRESS NOTES
Subjective:      Patient ID: Stephenie Menendez is a 31 y.o. female.    Chief Complaint:    HPI Stephenie Menendez is a 31 y.o. female who presents today complaining of allergic drug reaction to cymbalta. She last took the medication on 2 nights ago at 7:00PM she started having the reaction approximately 30 minutes later.  She took Benadryl immediately.  She then went to bed and woke up with a worsening rash all over her body from head to toe, at that point she went to the emergency department.  When she was seen in the emergency department she was given Benadryl, Pepcid, 10 mg of Decadron.  She was in no apparent distress and discharged with the plan of taking 50 mg of Benadryl every 4 hours and Pepcid twice daily.  Since her discharge she has had a worsening of her rash and her throat has a tickle which causes her to cough.  She is also having a tingling sensation around her lips.  She denies any difficulty breathing.  She reports that she has been following the discharge instructions closely.      Past Medical History:   Diagnosis Date     Anemia      Depression        Past Surgical History:   Procedure Laterality Date     [facility]      Description:  Section;  Recorded: 2014;     TUBAL LIGATION         Family History   Problem Relation Age of Onset     Hypertension Mother      Hyperlipidemia Mother      Hypertension Father      Depression Brother      Autism Daughter      ADD / ADHD Son      Autism Son      Diabetes Maternal Grandmother      Depression Maternal Grandmother      Heart disease Maternal Grandmother      Hypertension Maternal Grandmother      Colon cancer Maternal Grandmother      Hypertension Maternal Grandfather      Hypertension Paternal Grandmother      Cancer Paternal Grandmother      stomach     Hypertension Paternal Grandfather        Social History   Substance Use Topics     Smoking status: Never Smoker     Smokeless tobacco: Never Used     Alcohol use Yes       Comment: socially        Review of Systems   Constitutional: Positive for fatigue.   Respiratory: Positive for cough. Negative for shortness of breath, wheezing and stridor.    Musculoskeletal: Negative for neck pain.   Skin: Positive for rash.       Objective:     /70  Pulse 70  Temp 98.4  F (36.9  C) (Oral)   Resp 12  Wt 145 lb 11.2 oz (66.1 kg)  LMP 03/07/2017 (Exact Date)  SpO2 99%  BMI 22.82 kg/m2    Physical Exam   Constitutional: She appears well-developed and well-nourished.   HENT:   Mouth/Throat: Uvula is midline and oropharynx is clear and moist. No oropharyngeal exudate, posterior oropharyngeal edema, posterior oropharyngeal erythema or tonsillar abscesses.   Neck: Normal range of motion. Neck supple. No tracheal tenderness and no muscular tenderness present. No rigidity. No edema and normal range of motion present.   Cardiovascular: Normal rate and regular rhythm.    Pulmonary/Chest: Effort normal and breath sounds normal. No stridor. No respiratory distress. She has no wheezes. She has no rales.   Patient had a dry cough intermittently throughout exam.  But she was in no apparent respiratory distress   Skin:   Macular erythematous rash consistent with a drug reaction present on face chest bilateral arms and trunk.  No excoriations present.  No angioedema present.       Procedures    Considering the patient was in no apparent distress, she was not having difficulty breathing I felt that it she was appropriate for outpatient therapy today.  Her vital signs were normal.  She was started on a Medrol Dosepak and instructed to follow-up with her primary care in 3-4 days.  If she develops any difficulty breathing she was instructed to call 911, or get herself to the emergency department. The patient agreed to this plan.  Assessment / Plan:     1. Allergic reaction caused by a drug  methylPREDNISolone (MEDROL DOSEPACK) 4 mg tablet         Patient Instructions   1) Take Medrol dose pack according  to the directions on the package.   2) Continue taking 50mg of Benadryl every 4 hours. Continue using Pepcid.  3) Try aloe vera topically for soothing sensation of skin.  4) Go to Emergency department if you have any difficulty breathing.   5) Follow up in 3-4 days with primary care provider.

## 2021-06-10 NOTE — PROGRESS NOTES
Skin / nail biopsy  Date/Time: 4/13/2017 5:00 PM  Performed by: TINO CASTORENA  Authorized by: TINO CASTORENA   Consent: Verbal consent obtained. Written consent not obtained.  Risks and benefits: risks, benefits and alternatives were discussed (infection, bleeding, pain)  Consent given by: patient  Patient understanding: patient states understanding of the procedure being performed  Patient consent: the patient's understanding of the procedure matches consent given  Procedure consent: procedure consent matches procedure scheduled  Patient identity confirmed: verbally with patient  Local anesthesia used: yes (0.5cc lidocaine 1% with epi.)    Anesthesia:  Local anesthesia used: yes (0.5cc lidocaine 1% with epi.)  Sedation:  Patient sedated: no    Patient tolerance: Patient tolerated the procedure well with no immediate complications  Comments: SUBJECTIVE: Stephenie Menendez is a 31 y.o. female comes in for mole removal for biopsy.  Discussed on today    OBJECTIVE: /80  Pulse 80  Temp 98.5  F (36.9  C) (Oral)   LMP 03/07/2017 (Exact Date)  SpO2 97%  Breastfeeding? No  general: healthy, comfortable.  description of lesion:  Vasculitis appearance. Erythematous nonblanching 1-2mm lesions macular lesions seen all over legs there are over 100.  Site of biospy, left inner distal thigh just above knee    procedure note:  patient was informed of the risks/ benefits of the procedure and consented to undergoing it.  the lesion was anesthetized with 0.5 ml of lidocaine with epi after being cleaned with alcohol.  Excellent anesthesia was achieved.  the lesion was then cleaned again with Betadine.  a 4 mm punch was used and a tissue  along with a siccors used to remove the biopsy tissue.    the wound was cleaned, antibiotic ointment was applied and a bandage was applied.  patient was instructed in post procedure cares.  keep dry, may leave open to the air or apply antibiotic and bandage.  use  tylenol for  discomfort.  do not submerge wound in water.     biopsy was sent for pathologic evaluation.    ASSESSMENT :/ PLAN: :  Biopsy of rash procedure.  see above for post procedure instructions.

## 2021-06-10 NOTE — PROGRESS NOTES
ASSESSMENT AND PLAN:  Stephenie Menendez 31 y.o. female is seen here on 05/15/17 for evaluation of moderately severe pain and stiffness affecting her distal interphalangeal joints, proximal interphalangeal joints in the right foot metatarsophalangeal joints.  She does not have as for as she is aware history of psoriasis herself or the family.  She had noted significant improvement in her joint pains following a 6 day course of steroids for her recent drug associated rash.  There are many signals here which suggest that there may be an underlying inflammatory process.  While she is too young to consider osteoarthritis such as affecting the DIPs and PIPs however her knee pain had led to an MRI which shows medial compartment osteoarthritic changes.  Those data are reviewed in the chart.  Her workup including anti-CCP antibody NEY rheumatoid factor are all within normal range.  I have asked her to take prednisone 10 mg daily major side effects including ocular metabolic bone related and neuropsychiatric were outlined to name a few.  Will check x-rays of the hands today.  X-rays of the hands were done today, reviewed those myself, findings: Remarkably intact joint spaces throughout particularly the metacarpophalangeal joints interphalangeal joints.  There are no erosions.  Labs as noted.  We will meet here in 30 days or sooner.  Diagnoses and all orders for this visit:    Polyarthralgia  -     predniSONE (DELTASONE) 10 mg tablet; Take 1 tablet (10 mg total) by mouth daily.  Dispense: 30 tablet; Refill: 1  -     Uric Acid  -     Cytoplasmic Neutrophil Antibodies  -     Parvovirus B19 Antibodies, IgG and IgM  -     Hepatitis C Antibody (Anti-HCV)  -     XR Hands Bilateral 3 or More VWS; Future; Expected date: 5/15/17  -     XR Feet Bilateral 3 Or More VWS; Future; Expected date: 5/15/17    Polyarthropathy, multiple sites  -     predniSONE (DELTASONE) 10 mg tablet; Take 1 tablet (10 mg total) by mouth daily.  Dispense: 30  tablet; Refill: 1  -     XR Hands Bilateral 3 or More VWS; Future; Expected date: 5/15/17  -     XR Feet Bilateral 3 Or More VWS; Future; Expected date: 5/15/17      HISTORY OF PRESENTING ILLNESS:  Stephenie Menendez, 31 y.o., female is here for evaluation of painful joints.  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  She reports that a year or so ago she started hurting.  This is in her hands and feet.  She points to her DIPs and PIPs as well as the toes to denote the site of pain.  This is worse in the mornings.  She has stiffness that can last up to 30-45 minutes.  Followed by long-standing pain for which she takes ibuprofen.  Since she has to get her kids ready for school she just has to get on with it.  She noted pain level to be moderately severe.  She is involvement of other joints such as her ankles more so on the left side.  She has no personal or family history of psoriasis ulcerative colitis Crohn's disease.  Her parents and are similarly affected with osteoarthritis affecting the small joints of the hands suggest the DIPs.  She did take short course of steroids for combating the side effects and the rash of Cymbalta and that she feels did help her pain significantly.  She has gained 20 pounds of weight recently.  She notes fatigue and weakness.  She has history of constipation.  She has history of anxiety and depression.  In view of this her citalopram was discontinued and started on duloxetine in the hope that some of her joint pains might also respond.  Right after she is took that she broke out in a rash and had difficulty breathing and cough.  She is non-smoker alcohol 3 drinks per week.  She likes to walk regularly she is at  once once.  She has history of headaches, anemia.  Other than that she reports no chronic medical issues.  She has taken ibuprofen intermittently for these symptoms does provide some help.  As for as the reproductive health goes she does not  plan any further pregnancy, already has had tubal ligation at the time of her most recent .   Further historical information and ADL limitations as noted in the multidimensional health assessment questionnaire attached in the EMR. Rest of the 13 system ROS is negative.     ALLERGIES:Sumatriptan and Cymbalta [duloxetine]    PAST MEDICAL/ACTIVE PROBLEMS/MEDICATION/ FAMILY HISTORY/SOCIAL DATA:  The patient has a family history of  Past Medical History:   Diagnosis Date     Anemia      Depression      History   Smoking Status     Never Smoker   Smokeless Tobacco     Never Used     Patient Active Problem List   Diagnosis     Major Depression, Recurrent     Palpitations     Fatigue     Anemia     Vitamin D deficiency     Bilateral hand pain     Rash     Current Outpatient Prescriptions   Medication Sig Dispense Refill     cholecalciferol, vitamin D3, 10,000 unit capsule Take 10,000 Units by mouth daily.       No current facility-administered medications for this visit.        COMPREHENSIVE EXAMINATION:  Vitals:    05/15/17 0917   BP: 112/60   Patient Site: Right Arm   Patient Position: Sitting   Cuff Size: Adult Regular   Pulse: 76   Weight: 148 lb 9.6 oz (67.4 kg)     A well appearing alert oriented female. Vital data as noted above. Her eyes without inflammation/scleromalacia. ENTwithout oral mucositis, thrush, nasal deformity, external ear redness, deformity. Her neck is without lymphadenopathy and supple. Lungs normal sounds, no pleural rub. Heart auscultation normal rate, rhythm; no pericardial rub and murmurs. Abdomen soft, non tender, no organomegaly. Skin examined for heliotrope, malar area eruption, lupus pernio, periungual erythema, sclerodactyly, papules, erythema nodosum, purpura, nail pitting, onycholysis, and obvious psoriasis lesion. Neurological examination shows normal alertness, speech, facial symmetry, tone and power in upper and lower extremities, Tinel's and Phalen's at wrist and gait. The  joint examination is performed for swelling, tenderness, warmth, erythema, and range of motion in the following joints: DIPs, PIPs, MCPs, wrists, first CMC's, elbows, shoulders, hips, knees, ankles, feet; spine for range of motion and paraspinal muscles for tenderness. The salient normal / abnormal findings are appended.  She is tender in several of her distal interphalangeal joints as well as the proximal interphalangeal joints.  She is tender in her metacarpophalangeal joints and has subtle swelling especially on her right side.  She does not have dactylitis.  There is no nail pitting Onikul lysis.  She does not have definite enthesitis such as at the Achilles, patellar, epicondylar areas..    LAB / IMAGING DATA:  ALT   Date Value Ref Range Status   03/08/2016 18 0 - 45 U/L Final   03/02/2016 18 0 - 45 U/L Final     Albumin   Date Value Ref Range Status   03/08/2016 4.2 3.5 - 5.0 g/dL Final   03/02/2016 4.4 3.5 - 5.0 g/dL Final     Creatinine   Date Value Ref Range Status   03/08/2016 0.78 0.60 - 1.10 mg/dL Final   03/02/2016 0.78 0.60 - 1.10 mg/dL Final   06/20/2015 0.83 0.60 - 1.10 mg/dL Final       WBC   Date Value Ref Range Status   04/13/2017 6.8 4.0 - 11.0 thou/uL Final   03/29/2017 7.5 4.0 - 11.0 thou/uL Final   06/20/2015 5.0 4.0 - 11.0 thou/uL Final     Hemoglobin   Date Value Ref Range Status   04/13/2017 12.6 12.0 - 16.0 g/dL Final   03/29/2017 11.8 (L) 12.0 - 16.0 g/dL Final   03/08/2016 11.3 (L) 12.0 - 16.0 g/dL Final     Platelets   Date Value Ref Range Status   04/13/2017 284 140 - 440 thou/uL Final   03/29/2017 214 140 - 440 thou/uL Final   03/08/2016 201 140 - 440 thou/uL Final       Lab Results   Component Value Date    RF <15.0 03/29/2017    SEDRATE 2 04/13/2017

## 2021-06-11 ENCOUNTER — HOSPITAL ENCOUNTER (OUTPATIENT)
Dept: CARDIOLOGY | Facility: CLINIC | Age: 36
Discharge: HOME | End: 2021-06-11
Attending: FAMILY MEDICINE
Payer: COMMERCIAL

## 2021-06-11 DIAGNOSIS — R68.89 EXERCISE INTOLERANCE: ICD-10-CM

## 2021-06-11 DIAGNOSIS — Z86.16 PERSONAL HISTORY OF COVID-19: ICD-10-CM

## 2021-06-11 LAB
AORTIC ROOT: 3.1 CM
ASCENDING AORTA: 2.5 CM
BSA FOR ECHO PROCEDURE: 1.87 M2
CV BLOOD PRESSURE: ABNORMAL MMHG
CV ECHO HEIGHT: 66.5 IN
CV ECHO WEIGHT: 165 LBS
DOP CALC LVOT AREA: 3.46 CM2
DOP CALC LVOT DIAMETER: 2.1 CM
DOP CALC LVOT PEAK VEL: 98.7 CM/S
DOP CALC LVOT STROKE VOLUME: 58.9 CM3
DOP CALCLVOT PEAK VEL VTI: 17 CM
EJECTION FRACTION: 56 % (ref 55–75)
FRACTIONAL SHORTENING: 43.8 % (ref 28–44)
INTERVENTRICULAR SEPTUM IN END DIASTOLE: 0.9 CM (ref 0.6–0.9)
IVS/PW RATIO: 1.1
LA AREA 1: 16.1 CM2
LA AREA 2: 13.1 CM2
LEFT ATRIUM LENGTH: 4 CM
LEFT ATRIUM SIZE: 3.1 CM
LEFT ATRIUM TO AORTIC ROOT RATIO: 1 NO UNITS
LEFT ATRIUM VOLUME INDEX: 24 ML/M2
LEFT ATRIUM VOLUME: 44.8 ML
LEFT VENTRICLE DIASTOLIC VOLUME INDEX: 57.8 CM3/M2 (ref 29–61)
LEFT VENTRICLE DIASTOLIC VOLUME: 108 CM3 (ref 46–106)
LEFT VENTRICLE MASS INDEX: 73.3 G/M2
LEFT VENTRICLE SYSTOLIC VOLUME INDEX: 25.7 CM3/M2 (ref 8–24)
LEFT VENTRICLE SYSTOLIC VOLUME: 48 CM3 (ref 14–42)
LEFT VENTRICULAR INTERNAL DIMENSION IN DIASTOLE: 4.8 CM (ref 3.8–5.2)
LEFT VENTRICULAR INTERNAL DIMENSION IN SYSTOLE: 2.7 CM (ref 2.2–3.5)
LEFT VENTRICULAR MASS: 137.1 G
LEFT VENTRICULAR OUTFLOW TRACT MEAN GRADIENT: 2 MMHG
LEFT VENTRICULAR OUTFLOW TRACT MEAN VELOCITY: 65.3 CM/S
LEFT VENTRICULAR OUTFLOW TRACT PEAK GRADIENT: 4 MMHG
LEFT VENTRICULAR POSTERIOR WALL IN END DIASTOLE: 0.8 CM (ref 0.6–0.9)
LV STROKE VOLUME INDEX: 31.5 ML/M2
MITRAL VALVE E/A RATIO: 1.3
MV AVERAGE E/E' RATIO: 5.8 CM/S
MV DECELERATION TIME: 225 MS
MV E'TISSUE VEL-LAT: 14.6 CM/S
MV E'TISSUE VEL-MED: 10 CM/S
MV LATERAL E/E' RATIO: 4.9
MV MEDIAL E/E' RATIO: 7.2
MV PEAK A VELOCITY: 54.4 CM/S
MV PEAK E VELOCITY: 71.7 CM/S
NUC REST DIASTOLIC VOLUME INDEX: 2640 LBS
NUC REST SYSTOLIC VOLUME INDEX: 66.5 IN
PV ACCELERATION TIME: 180 MS
RIGHT VENTRICULAR INTERNAL DIMENSION IN DYSTOLE: 3.4 CM
TRICUSPID VALVE ANULAR PLANE SYSTOLIC EXCURSION: 2.5 CM

## 2021-06-11 ASSESSMENT — MIFFLIN-ST. JEOR: SCORE: 1463.13

## 2021-06-11 NOTE — PROGRESS NOTES
"ASSESSMENT AND PLAN:  Stephenie Menendez 31 y.o. female is seen here on 07/05/17 for evaluation of moderately severe pain and stiffness affecting her distal interphalangeal joints, proximal interphalangeal joints in the right foot metatarsophalangeal joints, with the some signals that she may have inflammatory polyarthritis, undifferentiated.  She does not have as for as she is aware history of psoriasis herself or the family.  Her NEY, rheumatoid factor, anti-CCP antibody, and ANCA are all negative.  Her parvo IgG was positive.  She has noted significant improvement with a modest dose of prednisone.  She has little residual tenderness in her joints.  She is going to finish the course of prednisone.  She will then observe for the next several weeks.  We discussed various scenarios.  One does come across an occasional patient where an inflammatory arthropathy can be self-limited.  Whether she belongs to that \"best case scenario\" group would be seen over time.  She may yet require immunosuppression such as with hydroxychloroquine.  She is confirmed that there is no one in the family with psoriasis or ulcerative colitis or Crohn's disease.  As for as reproductive aspects she is status post tubal ligation therefore if needed she may be a candidate for taking more aggressive immunosuppression beyond hydroxychloroquine.  We will meet here in the next 6-8 weeks.    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  She has polyarthralgias.  She had noted pain and stiffness in the DIPs PIPs and metatarsophalangeal joints without evidence of autoimmunity or family history of psoriasis or other associated conditions such as inflammatory bowel disease.  She has " been taking prednisone 10 mg daily.  She noted substantial improvement.  During this past several weeks she has only taken occasional ibuprofen compared with the past when she was taking it on a regular basis.  Her morning stiffness has diminished down to 15 minutes only.  She did notice one important finding that while she was on prednisone on 2 3 occasions she noted the swelling of the left knee.  In the past she has had MRI of the right knee which was referred to the alone.  She noted overall pain level at 2.5/10.  She has noted rest of the joint areas to be mildly painful.  She noted no fever weight loss blurry vision eye redness mouth ulcer nausea.  There is no cough.  She did not comment about the rash that she developed in the face almost like a butterfly rash which is intermittent, associated she thinks with situations where she is under stress.She notes fatigue and weakness.  She has history of constipation.  She has history of anxiety and depression.  In view of this her citalopram was discontinued and started on duloxetine in the hope that some of her joint pains might also respond.  Right after she is took that she broke out in a rash and had difficulty breathing and cough.  She is non-smoker alcohol 3 drinks per week.  She likes to walk regularly she is at  once once.  She has history of headaches, anemia.  Other than that she reports no chronic medical issues.  She has taken ibuprofen intermittently for these symptoms does provide some help.  As for as the reproductive health goes she does not plan any further pregnancy, already has had tubal ligation at the time of her most recent .  Further historical information and ADL limitations as noted in the multidimensional health assessment questionnaire attached in the EMR.       Following is the excerpt from a recent note: .  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  She reports that a year or so ago she  started hurting.  This is in her hands and feet.  She points to her DIPs and PIPs as well as the toes to denote the site of pain.  This is worse in the mornings.  She has stiffness that can last up to 30-45 minutes.  Followed by long-standing pain for which she takes ibuprofen.  Since she has to get her kids ready for school she just has to get on with it.  She noted pain level to be moderately severe.  She is involvement of other joints such as her ankles more so on the left side.  She has no personal or family history of psoriasis ulcerative colitis Crohn's disease.  Her parents and are similarly affected with osteoarthritis affecting the small joints of the hands suggest the DIPs.  She did take short course of steroids for combating the side effects and the rash of Cymbalta and that she feels did help her pain significantly.  She has gained 20 pounds of weight recently.  Rest of the 13 system ROS is negative.     ALLERGIES:Sumatriptan and Cymbalta [duloxetine]    PAST MEDICAL/ACTIVE PROBLEMS/MEDICATION/ FAMILY HISTORY/SOCIAL DATA:  The patient has a family history of  Past Medical History:   Diagnosis Date     Anemia      Depression      History   Smoking Status     Never Smoker   Smokeless Tobacco     Never Used     Patient Active Problem List   Diagnosis     Major Depression, Recurrent     Palpitations     Fatigue     Anemia     Vitamin D deficiency     Bilateral hand pain     Rash     Polyarthralgia     Polyarthropathy, multiple sites     Current Outpatient Prescriptions   Medication Sig Dispense Refill     acetaminophen (TYLENOL) 162.5 mg Take by mouth.       cholecalciferol, vitamin D3, 10,000 unit capsule Take 10,000 Units by mouth daily.       methylPREDNISolone (MEDROL DOSEPACK) 4 mg tablet Take by mouth as instructed per packaging.       No current facility-administered medications for this visit.        DETAILED EXAMINATION  07/05/17  :  Vitals:    07/05/17 0836   BP: 116/64   Patient Site: Left Arm  "  Patient Position: Sitting   Cuff Size: Adult Regular   Weight: 147 lb 3.2 oz (66.8 kg)   Height: 5' 7\" (1.702 m)     Alert oriented. Head including the face is examined for malar rash, heliotropes, scarring, lupus pernio. Eyes examined for redness such as in episcleritis/scleritis, periorbital lesions.   Neck examined  for lymph nodes, range of motion Both upper and lower extremities (all four) examined for swollen, warm &/or  tender joints, range of motion, rash, muscle weakness, edema. The salient normal / abnormal findings are appended.  She has very little of the tenderness left and that she had on her previous visit she had scattered tenderness and occasional DIP.  There is no dactylitis enthesitis there is no nail pitting or Onikul lysis.  She does not have facial eruption, she is not wearing makeup.       LAB / IMAGING DATA:  ALT   Date Value Ref Range Status   03/08/2016 18 0 - 45 U/L Final   03/02/2016 18 0 - 45 U/L Final     Albumin   Date Value Ref Range Status   03/08/2016 4.2 3.5 - 5.0 g/dL Final   03/02/2016 4.4 3.5 - 5.0 g/dL Final     Creatinine   Date Value Ref Range Status   03/08/2016 0.78 0.60 - 1.10 mg/dL Final   03/02/2016 0.78 0.60 - 1.10 mg/dL Final   06/20/2015 0.83 0.60 - 1.10 mg/dL Final       WBC   Date Value Ref Range Status   04/13/2017 6.8 4.0 - 11.0 thou/uL Final   03/29/2017 7.5 4.0 - 11.0 thou/uL Final   06/20/2015 5.0 4.0 - 11.0 thou/uL Final     Hemoglobin   Date Value Ref Range Status   04/13/2017 12.6 12.0 - 16.0 g/dL Final   03/29/2017 11.8 (L) 12.0 - 16.0 g/dL Final   03/08/2016 11.3 (L) 12.0 - 16.0 g/dL Final     Platelets   Date Value Ref Range Status   04/13/2017 284 140 - 440 thou/uL Final   03/29/2017 214 140 - 440 thou/uL Final   03/08/2016 201 140 - 440 thou/uL Final       Lab Results   Component Value Date    RF <15.0 03/29/2017    SEDRATE 2 04/13/2017          "

## 2021-06-11 NOTE — PROGRESS NOTES
ASSESSMENT AND PLAN:  Stephenie Menendez 31 y.o. female is seen here on 07/05/17 for evaluation of moderately severe pain and stiffness affecting her distal interphalangeal joints, proximal interphalangeal joints in the right foot metatarsophalangeal joints.  She does not have as for as she is aware history of psoriasis herself or the family.  She had noted significant improvement in her joint pains following a 6 day course of steroids for her recent drug associated rash.  There are many signals here which suggest that there may be an underlying inflammatory process.  While she is too young to consider osteoarthritis such as affecting the DIPs and PIPs however her knee pain had led to an MRI which shows medial compartment osteoarthritic changes.  Those data are reviewed in the chart.  Her workup including anti-CCP antibody NEY rheumatoid factor are all within normal range.  I have asked her to take prednisone 10 mg daily major side effects including ocular metabolic bone related and neuropsychiatric were outlined to name a few.  Will check x-rays of the hands today.  X-rays of the hands were done today, reviewed those myself, findings: Remarkably intact joint spaces throughout particularly the metacarpophalangeal joints interphalangeal joints.  There are no erosions.  Labs as noted.  We will meet here in 30 days or sooner.  There are no diagnoses linked to this encounter.  HISTORY OF PRESENTING ILLNESS:  Stephenie Menendez, 31 y.o., female is here for evaluation of painful joints.  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  She reports that a year or so ago she started hurting.  This is in her hands and feet.  She points to her DIPs and PIPs as well as the toes to denote the site of pain.  This is worse in the mornings.  She has stiffness that can last up to 30-45 minutes.  Followed by long-standing pain for which she takes ibuprofen.  Since she has to get her kids ready for school she  just has to get on with it.  She noted pain level to be moderately severe.  She is involvement of other joints such as her ankles more so on the left side.  She has no personal or family history of psoriasis ulcerative colitis Crohn's disease.  Her parents and are similarly affected with osteoarthritis affecting the small joints of the hands suggest the DIPs.  She did take short course of steroids for combating the side effects and the rash of Cymbalta and that she feels did help her pain significantly.  She has gained 20 pounds of weight recently.  She notes fatigue and weakness.  She has history of constipation.  She has history of anxiety and depression.  In view of this her citalopram was discontinued and started on duloxetine in the hope that some of her joint pains might also respond.  Right after she is took that she broke out in a rash and had difficulty breathing and cough.  She is non-smoker alcohol 3 drinks per week.  She likes to walk regularly she is at  once once.  She has history of headaches, anemia.  Other than that she reports no chronic medical issues.  She has taken ibuprofen intermittently for these symptoms does provide some help.  As for as the reproductive health goes she does not plan any further pregnancy, already has had tubal ligation at the time of her most recent .   Further historical information and ADL limitations as noted in the multidimensional health assessment questionnaire attached in the EMR. Rest of the 13 system ROS is negative.     ALLERGIES:Sumatriptan and Cymbalta [duloxetine]    PAST MEDICAL/ACTIVE PROBLEMS/MEDICATION/ FAMILY HISTORY/SOCIAL DATA:  The patient has a family history of  Past Medical History:   Diagnosis Date     Anemia      Depression      History   Smoking Status     Never Smoker   Smokeless Tobacco     Never Used     Patient Active Problem List   Diagnosis     Major Depression, Recurrent     Palpitations     Fatigue     Anemia     Vitamin  "D deficiency     Bilateral hand pain     Rash     Polyarthralgia     Polyarthropathy, multiple sites     Current Outpatient Prescriptions   Medication Sig Dispense Refill     acetaminophen (TYLENOL) 162.5 mg Take by mouth.       cholecalciferol, vitamin D3, 10,000 unit capsule Take 10,000 Units by mouth daily.       methylPREDNISolone (MEDROL DOSEPACK) 4 mg tablet Take by mouth as instructed per packaging.       No current facility-administered medications for this visit.        COMPREHENSIVE EXAMINATION:  Vitals:    07/05/17 0836   BP: 116/64   Patient Site: Left Arm   Patient Position: Sitting   Cuff Size: Adult Regular   Weight: 147 lb 3.2 oz (66.8 kg)   Height: 5' 7\" (1.702 m)     A well appearing alert oriented female. Vital data as noted above. Her eyes without inflammation/scleromalacia. ENTwithout oral mucositis, thrush, nasal deformity, external ear redness, deformity. Her neck is without lymphadenopathy and supple. Lungs normal sounds, no pleural rub. Heart auscultation normal rate, rhythm; no pericardial rub and murmurs. Abdomen soft, non tender, no organomegaly. Skin examined for heliotrope, malar area eruption, lupus pernio, periungual erythema, sclerodactyly, papules, erythema nodosum, purpura, nail pitting, onycholysis, and obvious psoriasis lesion. Neurological examination shows normal alertness, speech, facial symmetry, tone and power in upper and lower extremities, Tinel's and Phalen's at wrist and gait. The joint examination is performed for swelling, tenderness, warmth, erythema, and range of motion in the following joints: DIPs, PIPs, MCPs, wrists, first CMC's, elbows, shoulders, hips, knees, ankles, feet; spine for range of motion and paraspinal muscles for tenderness. The salient normal / abnormal findings are appended.  She is tender in several of her distal interphalangeal joints as well as the proximal interphalangeal joints.  She is tender in her metacarpophalangeal joints and has subtle " swelling especially on her right side.  She does not have dactylitis.  There is no nail pitting Onikul lysis.  She does not have definite enthesitis such as at the Achilles, patellar, epicondylar areas..    LAB / IMAGING DATA:  ALT   Date Value Ref Range Status   03/08/2016 18 0 - 45 U/L Final   03/02/2016 18 0 - 45 U/L Final     Albumin   Date Value Ref Range Status   03/08/2016 4.2 3.5 - 5.0 g/dL Final   03/02/2016 4.4 3.5 - 5.0 g/dL Final     Creatinine   Date Value Ref Range Status   03/08/2016 0.78 0.60 - 1.10 mg/dL Final   03/02/2016 0.78 0.60 - 1.10 mg/dL Final   06/20/2015 0.83 0.60 - 1.10 mg/dL Final       WBC   Date Value Ref Range Status   04/13/2017 6.8 4.0 - 11.0 thou/uL Final   03/29/2017 7.5 4.0 - 11.0 thou/uL Final   06/20/2015 5.0 4.0 - 11.0 thou/uL Final     Hemoglobin   Date Value Ref Range Status   04/13/2017 12.6 12.0 - 16.0 g/dL Final   03/29/2017 11.8 (L) 12.0 - 16.0 g/dL Final   03/08/2016 11.3 (L) 12.0 - 16.0 g/dL Final     Platelets   Date Value Ref Range Status   04/13/2017 284 140 - 440 thou/uL Final   03/29/2017 214 140 - 440 thou/uL Final   03/08/2016 201 140 - 440 thou/uL Final       Lab Results   Component Value Date    RF <15.0 03/29/2017    SEDRATE 2 04/13/2017

## 2021-06-12 NOTE — PROGRESS NOTES
ASSESSMENT AND PLAN:  Stephenie Menendez 31 y.o. female is seen here on 08/30/17 for follow-up of polyarthralgias affecting the DIPs of index and middle finger bilaterally, and metatarsophalangeal joints.  She has hallmarks of persistent inflammatory polyarthritis affecting her PIPs, metatarsophalangeal joints.  She also has involvement of the DIPs of the index and the middle fingers.  The exact characterization of her inflammatory arthropathy is going to require on more observation.  There is no personal family history of psoriasis she is going to start hydroxychloroquine.  She is also amongst various options chosen on prednisone low-dose.  Recommend [employer] handouts on hydroxychloroquine provided major side effects are reviewed with her.  She is aware of the knees with examination of the eyes.  From the reproductive perspective she has had tubal ligations.  I will meet here in 3 months.      Diagnoses and all orders for this visit:    Bilateral hand pain  -     hydroxychloroquine (PLAQUENIL) 200 mg tablet; Take 1 tablet (200 mg total) by mouth 2 (two) times a day.  Dispense: 60 tablet; Refill: 6  -     predniSONE (DELTASONE) 2.5 MG tablet; Take 7.5 mg/d prednisone PO for 1 month, reduce to 5 mg/d for the next month, and then reduce to 2.5 mg/d for the third month and then stop.  Dispense: 90 tablet; Refill: 2    Polyarthralgia    Inflammatory polyarthritis  -     hydroxychloroquine (PLAQUENIL) 200 mg tablet; Take 1 tablet (200 mg total) by mouth 2 (two) times a day.  Dispense: 60 tablet; Refill: 6  -     predniSONE (DELTASONE) 2.5 MG tablet; Take 7.5 mg/d prednisone PO for 1 month, reduce to 5 mg/d for the next month, and then reduce to 2.5 mg/d for the third month and then stop.  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  She has recurrence of polyarthralgias.  She had noted pain and stiffness in the DIPs PIPs and metatarsophalangeal joints without evidence of autoimmunity or family history of psoriasis or other associated conditions such as inflammatory bowel disease.  For the past several weeks now she has been off all treatments.  Within 2 weeks of stopping prednisone her symptoms returned.  Her worst part of the morning when she has stiffness.  She has noted pain not only the DIPs and PIPs of the index and middle finger she also points to the metatarsophalangeal joint area   She has history of anxiety and depression.  In view of this her citalopram was discontinued and started on duloxetine in the hope that some of her joint pains might also respond.  Right after she is took that she broke out in a rash and had difficulty breathing and cough.  She is non-smoker alcohol 3 drinks per week.  She likes to walk regularly she is at  once once.  She has history of headaches, anemia.  Other than that she reports no chronic medical issues.  She has taken ibuprofen intermittently for these symptoms does provide some help.  As for as the reproductive health goes she does not plan any further pregnancy, already has had tubal ligation at the time of her most recent .  Further historical information and ADL limitations as noted in the multidimensional health assessment questionnaire attached in the EMR. Following is the excerpt from a recent note: .  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  She reports that a year or so ago she started hurting.  This is in her hands and feet.  She points to her DIPs and PIPs as well as the toes to denote the site of pain.  This is worse in the mornings.  She has stiffness that can last up to 30-45 minutes.  Followed by long-standing pain for which she takes ibuprofen.  Since she has to get her kids ready for  "school she just has to get on with it.  She noted pain level to be moderately severe.  She is involvement of other joints such as her ankles more so on the left side.  She has no personal or family history of psoriasis ulcerative colitis Crohn's disease.  Her parents and are similarly affected with osteoarthritis affecting the small joints of the hands suggest the DIPs.  She did take short course of steroids for combating the side effects and the rash of Cymbalta and that she feels did help her pain significantly.  She has gained 20 pounds of weight recently.  Rest of the 13 system ROS is negative.     ALLERGIES:Sumatriptan and Cymbalta [duloxetine]    PAST MEDICAL/ACTIVE PROBLEMS/MEDICATION/ FAMILY HISTORY/SOCIAL DATA:  The patient has a family history of  Past Medical History:   Diagnosis Date     Anemia      Depression      History   Smoking Status     Never Smoker   Smokeless Tobacco     Never Used     Patient Active Problem List   Diagnosis     Major Depression, Recurrent     Palpitations     Fatigue     Anemia     Vitamin D deficiency     Bilateral hand pain     Rash     Polyarthralgia     Current Outpatient Prescriptions   Medication Sig Dispense Refill     acetaminophen (TYLENOL) 162.5 mg Take by mouth.       cholecalciferol, vitamin D3, 10,000 unit capsule Take 10,000 Units by mouth daily.       No current facility-administered medications for this visit.        DETAILED EXAMINATION  08/30/17  :  Vitals:    08/30/17 0850   BP: 102/72   Patient Site: Left Arm   Patient Position: Sitting   Cuff Size: Adult Regular   Pulse: 60   Weight: 142 lb 6.4 oz (64.6 kg)   Height: 5' 7\" (1.702 m)     Alert oriented. Head including the face is examined for malar rash, heliotropes, scarring, lupus pernio. Eyes examined for redness such as in episcleritis/scleritis, periorbital lesions.   Neck examined  for lymph nodes, range of motion Both upper and lower extremities (all four) examined for swollen, warm &/or  tender " joints, range of motion, rash, muscle weakness, edema. The salient normal / abnormal findings are appended.  She has marked tenderness in several of her PIPs, she has swelling tenderness of her right second third fourth metatarsophalangeal joints.  There is no dactylitis and no nail changes such as pitting or onycholysis.  There is no enthesitis.  She is tender in the flexor tendons of the left hand.    LAB / IMAGING DATA:  ALT   Date Value Ref Range Status   03/08/2016 18 0 - 45 U/L Final   03/02/2016 18 0 - 45 U/L Final     Albumin   Date Value Ref Range Status   03/08/2016 4.2 3.5 - 5.0 g/dL Final   03/02/2016 4.4 3.5 - 5.0 g/dL Final     Creatinine   Date Value Ref Range Status   03/08/2016 0.78 0.60 - 1.10 mg/dL Final   03/02/2016 0.78 0.60 - 1.10 mg/dL Final   06/20/2015 0.83 0.60 - 1.10 mg/dL Final       WBC   Date Value Ref Range Status   04/13/2017 6.8 4.0 - 11.0 thou/uL Final   03/29/2017 7.5 4.0 - 11.0 thou/uL Final   06/20/2015 5.0 4.0 - 11.0 thou/uL Final     Hemoglobin   Date Value Ref Range Status   04/13/2017 12.6 12.0 - 16.0 g/dL Final   03/29/2017 11.8 (L) 12.0 - 16.0 g/dL Final   03/08/2016 11.3 (L) 12.0 - 16.0 g/dL Final     Platelets   Date Value Ref Range Status   04/13/2017 284 140 - 440 thou/uL Final   03/29/2017 214 140 - 440 thou/uL Final   03/08/2016 201 140 - 440 thou/uL Final       Lab Results   Component Value Date    RF <15.0 03/29/2017    SEDRATE 2 04/13/2017

## 2021-06-13 NOTE — PROGRESS NOTES
ASSESSMENT AND PLAN:     Problem List Items Addressed This Visit        Unprioritized    Chronic pain of left knee - Primary     New complaint today, ongoing 2 weeks. Barely able to walk from chair to exam table, clearly in significant pain and limping. Not a candidate for physical therapy with this current severity of pain.  Need diagnostic imaging to evaluate for meniscal tear or other derangement of the knee.   xr knee done. Normal findings. Personally reviewed with the patient.  Mri knee pending  Ortho consult ordered  Knee immobilizer recommended for comfort.         Relevant Medications    diazePAM (VALIUM) 5 MG tablet    Other Relevant Orders    XR Knee Left 1 or 2 VWS    MR Knee Without Contrast Left    Ambulatory referral to Orthopedic Surgery    Knee DME: Knee Immoblizer, Knee Immobilizer; Left           Chief Complaint   Patient presents with     Knee Pain     x 2 weeks.  No known injury. Stiff, sore and swollen. Bending is worse.         HPI  Stephenie Menendez is a 34 y.o. female comes in today complaining of left knee pain ongoing for the past 2 weeks.  She recalls no inciting event.  She feels like it is gradually getting worse.  The pain is a stiff feeling.  She also has the feeling like it is swollen.  The pain is located on the inside of her left knee and does radiate upward in the inner thigh area about residential up the inner thigh.  Severity of the pain rates between 3 -6/ 10 and is noted to be worst at night.  She sleeps on her side or on her back.  She also notes that she has difficulty climbing stairs both up and down.  The pain is constant although it does get worse when she is climbing stairs and when she is trying to sleep at night she is woken up by it.    Social History     Tobacco Use   Smoking Status Never Smoker   Smokeless Tobacco Never Used      Current Outpatient Medications on File Prior to Visit   Medication Sig Dispense Refill     cholecalciferol, vitamin D3, 10,000 unit capsule  Take 2,000 Units by mouth daily .             No current facility-administered medications on file prior to visit.       Allergies   Allergen Reactions     Sumatriptan      Cymbalta [Duloxetine] Rash     THROAT CONSTRICTION       Review of Systems   Constitutional: Negative.    HENT: Negative.    Eyes: Negative.    Respiratory: Negative.    Cardiovascular: Negative.    Gastrointestinal: Negative.    Endocrine: Negative.    Genitourinary: Negative.    Musculoskeletal: Negative.    Skin: Negative.    Neurological: Negative.    Hematological: Negative.    Psychiatric/Behavioral: Negative.         OBJECTIVE: /82   Pulse 80   Resp 16   LMP 11/24/2020 (Exact Date)   Breastfeeding No    Physical Exam  Constitutional:       General: She is not in acute distress.     Appearance: She is well-developed.   HENT:      Head: Normocephalic and atraumatic.   Eyes:      Conjunctiva/sclera: Conjunctivae normal.   Neck:      Musculoskeletal: Neck supple.   Cardiovascular:      Rate and Rhythm: Normal rate.   Pulmonary:      Effort: Pulmonary effort is normal.   Musculoskeletal:      Left knee: She exhibits decreased range of motion, swelling and effusion. She exhibits no ecchymosis, no deformity, no laceration, no erythema, normal alignment, no LCL laxity, normal patellar mobility, no bony tenderness, normal meniscus and no MCL laxity. Tenderness (tender medial knee just medial to and superior to the patella. ) found. No medial joint line, no lateral joint line, no MCL, no LCL and no patellar tendon tenderness noted.   Skin:     General: Skin is warm and dry.   Neurological:      Mental Status: She is alert and oriented to person, place, and time.        xr knee personally reviewed with the patient in clinic, no cause for pain noted, normal xray. Await radiologist reading.    Additional History from Old Records Summarized (2): no  Decision to Obtain Records (1): no  Radiology Tests Summarized or Ordered (1): yes  Labs  Reviewed or Ordered (1): no  Medicine Test Summarized or Ordered (1): yes  Independent Review of EKG or X-RAY(2 each): yes    This note was created using Dragon dictation.  Please excuse any grammatical errors.

## 2021-06-14 NOTE — PROGRESS NOTES
ASSESSMENT AND PLAN:   Problem List Items Addressed This Visit     Major Depression, Recurrent     Celexa did not work for her.  She actually felt like she got sick on the Celexa and felt a metallic taste in her mouth and a funny feeling in her head.  She still is interested in taking something so we talked about using Lexapro.  I will send a prescription for Lexapro over to her pharmacy to try.  She should return to clinic in 1 month to follow-up on this.    Lexapro 10 mg orally per day #30 w 1 rf     Little interest or pleasure in doing things: Several days  Feeling down, depressed, or hopeless: More than half the days  Trouble falling or staying asleep, or sleeping too much: Not at all  Feeling tired or having little energy: Nearly every day  Poor appetite or overeating: Not at all  Feeling bad about yourself - or that you are a failure or have let yourself or your family down: Several days  Trouble concentrating on things, such as reading the newspaper or watching television: Several days  Moving or speaking so slowly that other people could have noticed. Or the opposite - being so fidgety or restless that you have been moving around a lot more than usual: Not at all  Thoughts that you would be better off dead, or of hurting yourself in some way: Several days  PHQ-9 Total Score: 9  If you checked off any problems, how difficult have these problems made it for you to do your work, take care of things at home, or get along with other people?: Somewhat difficult     We talked specifically about whether she felt suicidal and she says that she would never harm herself.  She wants to live for her family and she feels hopeful that she will feel better with medication.             Relevant Medications    escitalopram oxalate (LEXAPRO) 10 MG tablet    Acute neck pain     CBC is normal.  MRI is also unremarkable.  I suspect she has some inflammation in her cervical spine that is causing her symptoms.      I have  recommended prednisone 10 mg orally per day (and wean down gradually as tolerated,  as long as sx are abated, back to 2.5mg orally per day).  She says she has plenty of prednisone at home from Dr. Millan so she will start taking 10 mg orally per day and I have asked her to call me tomorrow to ensure that this is helping her symptoms.    See hyperreflexia of upper and lower extremity on the left side and weakness of the left side in the problem list    Neurology consult has been obtained and she has an appointment with the neurologist on December 12.         Relevant Orders    MR Cervical Spine With Without Contrast (Completed)    Ambulatory referral to Neurology    [facility](CBC and Differential) (Completed)    [facility] (CBC with Diff) (Completed)    Hyperreflexia of upper and lower extremity left side     See acute neck pain in problem list         Relevant Orders    MR Cervical Spine With Without Contrast (Completed)    Ambulatory referral to Neurology    [facility](CBC and Differential) (Completed)    [facility] (CBC with Diff) (Completed)    Weakness of left side of body     See acute neck pain in problem list         Relevant Orders    MR Cervical Spine With Without Contrast (Completed)    Ambulatory referral to Neurology    [facility](CBC and Differential) (Completed)    [facility] (CBC with Diff) (Completed)           Chief Complaint   Patient presents with     Concerns     Pinched nerve in neck: (Left Side) Shoots up from left foot into neck. Happened a week ago Sunday.     HPI  Stephenie Menendez is a 31 y.o. female comes in for left side shooting pain.  This has been ongoing for 1 week.  She feels like it shoots from her neck into her left arm and fingers.  Massage, heat, and ice were helpful but did not take away the symptoms.  She says when she walks the steps seem to jar her neck and her neck hurts.  She also has trouble turning her head to the left.    History   Smoking Status     Never Smoker   Smokeless Tobacco     Never Used      Current  "Outpatient Prescriptions   Medication Sig Dispense Refill     acetaminophen (TYLENOL) 162.5 mg Take by mouth.       hydroxychloroquine (PLAQUENIL) 200 mg tablet Take 1 tablet (200 mg total) by mouth 2 (two) times a day. 180 tablet 0     predniSONE (DELTASONE) 2.5 MG tablet Take 7.5 mg/d prednisone PO for 1 month, reduce to 5 mg/d for the next month, and then reduce to 2.5 mg/d for the third month and then stop. 90 tablet 2     cholecalciferol, vitamin D3, 10,000 unit capsule Take 10,000 Units by mouth daily.       citalopram (CELEXA) 20 MG tablet Take 1 tablet (20 mg total) by mouth daily. 30 tablet 2     escitalopram oxalate (LEXAPRO) 10 MG tablet Take 1 tablet (10 mg total) by mouth daily. 30 tablet 1     No current facility-administered medications for this visit.      Allergies   Allergen Reactions     Sumatriptan      Cymbalta [Duloxetine] Rash     THROAT CONSTRICTION     Review of Systems   Constitutional: Negative.    HENT: Negative.    Eyes: Negative.    Respiratory: Negative.    Cardiovascular: Negative.    Gastrointestinal: Negative.    Endocrine: Negative.    Genitourinary: Negative.    Musculoskeletal: Negative.    Skin: Negative.    Neurological: Negative.    Hematological: Negative.    Psychiatric/Behavioral: Negative.          OBJECTIVE: Blood pressure 110/70, pulse 82, temperature 98.8  F (37.1  C), temperature source Oral, resp. rate 14, height 5' 7\" (1.702 m), weight 138 lb (62.6 kg), last menstrual period 11/20/2017, SpO2 98 %, not currently breastfeeding.  Physical Exam   Constitutional: She is oriented to person, place, and time. She appears well-developed and well-nourished. No distress.   HENT:   Head: Normocephalic and atraumatic.   Eyes: Conjunctivae are normal.   Neck: Neck supple.   Cardiovascular: Normal rate and regular rhythm.    Pulmonary/Chest: Effort normal.   Musculoskeletal: Normal range of motion.   Neurological: She is alert and oriented to person, place, and time. No cranial " nerve deficit or sensory deficit. She displays no Babinski's sign on the right side. She displays no Babinski's sign on the left side.   Reflex Scores:       Tricep reflexes are 1+ on the right side and 2+ on the left side.       Bicep reflexes are 1+ on the right side and 2+ on the left side.       Brachioradialis reflexes are 1+ on the right side and 2+ on the left side.       Patellar reflexes are 1+ on the right side and 2+ on the left side.       Achilles reflexes are 1+ on the right side and 2+ on the left side.  Gait is normal.  Finger  does seem about 50% as strong on the left compared to the right.  I can perceive no difference in the strength of her legs.   Skin: Skin is warm and dry.   Psychiatric: She has a normal mood and affect.       Little interest or pleasure in doing things: Several days  Feeling down, depressed, or hopeless: More than half the days  Trouble falling or staying asleep, or sleeping too much: Not at all  Feeling tired or having little energy: Nearly every day  Poor appetite or overeating: Not at all  Feeling bad about yourself - or that you are a failure or have let yourself or your family down: Several days  Trouble concentrating on things, such as reading the newspaper or watching television: Several days  Moving or speaking so slowly that other people could have noticed. Or the opposite - being so fidgety or restless that you have been moving around a lot more than usual: Not at all  Thoughts that you would be better off dead, or of hurting yourself in some way: Several days  PHQ-9 Total Score: 9  If you checked off any problems, how difficult have these problems made it for you to do your work, take care of things at home, or get along with other people?: Somewhat difficult     We talked specifically about suicidal ideation.  She says she has no plan to harm her self and she has hope that she will get better.  She wants to live for her family.

## 2021-06-14 NOTE — PROGRESS NOTES
ASSESSMENT AND PLAN:  Stephenie Menendez 31 y.o. female is seen here on 11/29/17 for follow-up she has inflammatory arthropathy, undifferentiated.  While there has been some improvement there are several episodes when she has a flareup that can last week to 10 days.  I have asked her to consider adding methotrexate to hydroxychloroquine.  She is a tubal ligation.  She wants to think through this and do her own research.  She will let us know if she wants to start taking this.  The rationale for initiating this were discussed.  She will take folic acid with methotrexate should he decide to go that route.  We will meet here in 3 months or sooner.  Should she choose to go on methotrexate she is aware that she should have labs done every 4 weeks until seen here in 3 months.      Diagnoses and all orders for this visit:    Inflammatory polyarthritis    Bilateral hand pain    Polyarthralgia      HISTORY OF PRESENTING ILLNESS:  Stephenie Menendez, 31 y.o., female is here for follow-up.  She has inflammatory arthropathy.  She is on hydroxychloroquine diminishing dose of prednisone.  She noted that she would get intermittent flareups better.  That she describes a lasting 7-10 days.  Typically affecting multiple joints including a point in the PIPs, hands, wrists elbows shoulders and other joints in the lower extremities.  During these episodes it is hard for her to do her day-to-day activities.  Once these past she is able to do most of the day-to-day activities with some difficulty or no difficulty at home.  Currently she noted pain level of 3.0/10 she is not undergoing a flare right now.  She noted pain level is mild and most of the joints.  She has noted swelling during these times such as the dorsum of her hands she is unable to detect swelling other places such as elbows wrists knees.  Currently she noted stiffness in the morning lasting no more than 15 minutes.  So far the best treatment she has had his prednisone 10  mg as she has diminished the dose the propensity to get more symptoms has resurfaced not quite to the same level of severity as it was previously.  She has no history of fever blurry vision eye redness mouth also nausea cough there is no rash.  She has noted before no history of psoriasis ulcerative colitis or Crohn's disease.  As for as the reproductive health goes she does not plan any further pregnancy, already has had tubal ligation at the time of her most recent .  Further historical information and ADL limitations as noted in the multidimensional health assessment questionnaire attached in the EMR.          Following is the excerpt from a recent note: .  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  She reports that a year or so ago she started hurting.  This is in her hands and feet.  She points to her DIPs and PIPs as well as the toes to denote the site of pain.  This is worse in the mornings.  She has stiffness that can last up to 30-45 minutes.  Followed by long-standing pain for which she takes ibuprofen.  Since she has to get her kids ready for school she just has to get on with it.  She noted pain level to be moderately severe.  She is involvement of other joints such as her ankles more so on the left side.  She has no personal or family history of psoriasis ulcerative colitis Crohn's disease.  Her parents and are similarly affected with osteoarthritis affecting the small joints of the hands suggest the DIPs.  She did take short course of steroids for combating the side effects and the rash of Cymbalta and that she feels did help her pain significantly.  She has gained 20 pounds of weight recently.  Rest of the 13 system ROS is negative.     ALLERGIES:Sumatriptan and Cymbalta [duloxetine]    PAST MEDICAL/ACTIVE PROBLEMS/MEDICATION/ FAMILY HISTORY/SOCIAL DATA:  The patient has a family history of  Past Medical History:   Diagnosis Date     Anemia      Depression   "    History   Smoking Status     Never Smoker   Smokeless Tobacco     Never Used     Patient Active Problem List   Diagnosis     Major Depression, Recurrent     Palpitations     Fatigue     Anemia     Vitamin D deficiency     Bilateral hand pain     Rash     Polyarthralgia     Inflammatory polyarthritis     Acute neck pain     Hyperreflexia of upper and lower extremity left side     Weakness of left side of body     Current Outpatient Prescriptions   Medication Sig Dispense Refill     cholecalciferol, vitamin D3, 10,000 unit capsule Take 10,000 Units by mouth daily.       hydroxychloroquine (PLAQUENIL) 200 mg tablet Take 1 tablet (200 mg total) by mouth 2 (two) times a day. 180 tablet 0     predniSONE (DELTASONE) 2.5 MG tablet Take 7.5 mg/d prednisone PO for 1 month, reduce to 5 mg/d for the next month, and then reduce to 2.5 mg/d for the third month and then stop. 90 tablet 2     No current facility-administered medications for this visit.      DETAILED EXAMINATION  11/29/17  :  Vitals:    11/29/17 0900   BP: 102/60   Pulse: 80   Weight: 138 lb (62.6 kg)   Height: 5' 7\" (1.702 m)     Alert oriented. Head including the face is examined for malar rash, heliotropes, scarring, lupus pernio. Eyes examined for redness such as in episcleritis/scleritis, periorbital lesions.   Neck/ Face examined for parotid gland swelling, range of motion of neck.  Left upper and lower and right upper and lower extremities examined for tenderness, swelling, warmth of the appendicular joints, range of motion, edema, rash.  Some of the important findings included: Is no definite synovitis in any of the palpable appendicular joints.  Nails are painted.  She appears comfortable.  There is no dactylitis.      LAB / IMAGING DATA:  ALT   Date Value Ref Range Status   03/08/2016 18 0 - 45 U/L Final   03/02/2016 18 0 - 45 U/L Final     Albumin   Date Value Ref Range Status   03/08/2016 4.2 3.5 - 5.0 g/dL Final   03/02/2016 4.4 3.5 - 5.0 g/dL Final "     Creatinine   Date Value Ref Range Status   03/08/2016 0.78 0.60 - 1.10 mg/dL Final   03/02/2016 0.78 0.60 - 1.10 mg/dL Final   06/20/2015 0.83 0.60 - 1.10 mg/dL Final       WBC   Date Value Ref Range Status   11/21/2017 4.7 4.0 - 11.0 thou/uL Final   04/13/2017 6.8 4.0 - 11.0 thou/uL Final   06/20/2015 5.0 4.0 - 11.0 thou/uL Final     Hemoglobin   Date Value Ref Range Status   11/21/2017 11.7 (L) 12.0 - 16.0 g/dL Final   04/13/2017 12.6 12.0 - 16.0 g/dL Final   03/29/2017 11.8 (L) 12.0 - 16.0 g/dL Final     Platelets   Date Value Ref Range Status   11/21/2017 233 140 - 440 thou/uL Final   04/13/2017 284 140 - 440 thou/uL Final   03/29/2017 214 140 - 440 thou/uL Final       Lab Results   Component Value Date    RF <15.0 03/29/2017    SEDRATE 2 04/13/2017

## 2021-06-15 PROBLEM — M13.0 POLYARTHROPATHY, MULTIPLE SITES: Status: ACTIVE | Noted: 2017-05-15

## 2021-06-16 ENCOUNTER — AMBULATORY - HEALTHEAST (OUTPATIENT)
Dept: NURSING | Facility: CLINIC | Age: 36
End: 2021-06-16

## 2021-06-16 PROBLEM — E78.2 MIXED HYPERLIPIDEMIA: Status: ACTIVE | Noted: 2018-12-11

## 2021-06-16 PROBLEM — Z86.16 PERSONAL HISTORY OF COVID-19: Status: ACTIVE | Noted: 2021-05-26

## 2021-06-16 PROBLEM — M54.2 NECK PAIN: Status: ACTIVE | Noted: 2017-11-21

## 2021-06-16 PROBLEM — G89.29 CHRONIC PAIN OF LEFT KNEE: Status: ACTIVE | Noted: 2020-12-03

## 2021-06-16 PROBLEM — R68.89 EXERCISE INTOLERANCE: Status: ACTIVE | Noted: 2021-05-26

## 2021-06-16 PROBLEM — M25.562 CHRONIC PAIN OF LEFT KNEE: Status: ACTIVE | Noted: 2020-12-03

## 2021-06-16 PROBLEM — Z00.00 PREVENTATIVE HEALTH CARE: Status: ACTIVE | Noted: 2018-11-27

## 2021-06-16 NOTE — PROGRESS NOTES
Subjective:      Patient ID: Stephenie Menendez is a 32 y.o. female.    Chief Complaint:    HPI Stephenie Menendez is a 32 y.o. female who presents today complaining of left ear pain, sore throat, cough, and fatigue x 4 day.         Past Medical History:   Diagnosis Date     Anemia      Depression        Past Surgical History:   Procedure Laterality Date     [place]  DELIVERY ONLY      Description:  Section;  Recorded: 2014;     TUBAL LIGATION         Family History   Problem Relation Age of Onset     Hypertension Mother      Hyperlipidemia Mother      Hypertension Father      Depression Brother      Autism Daughter      ADD / ADHD Son      Autism Son      Diabetes Maternal Grandmother      Depression Maternal Grandmother      Heart disease Maternal Grandmother      Hypertension Maternal Grandmother      Colon cancer Maternal Grandmother      Hypertension Maternal Grandfather      Hypertension Paternal Grandmother      Cancer Paternal Grandmother      stomach     Hypertension Paternal Grandfather        Social History   Substance Use Topics     Smoking status: Never Smoker     Smokeless tobacco: Never Used     Alcohol use Yes      Comment: socially        Review of Systems   Constitutional: Positive for fatigue. Negative for fever.   HENT: Positive for congestion, ear pain (left) and rhinorrhea. Negative for sinus pain and sinus pressure.    Respiratory: Positive for cough. Negative for shortness of breath and wheezing.    Gastrointestinal: Negative.        Objective:     /76 (Patient Site: Left Arm, Patient Position: Sitting)  Pulse 73  Temp 98.7  F (37.1  C)  Resp 16  Wt 144 lb 8 oz (65.5 kg)  LMP 2018 (Exact Date)  SpO2 99%  Breastfeeding? No  BMI 22.63 kg/m2    Physical Exam   Constitutional: She appears well-developed and well-nourished. No distress.   HENT:   Head: Normocephalic and atraumatic.   Right Ear: Tympanic membrane, external ear and ear canal normal.   Left Ear:  Tympanic membrane, external ear and ear canal normal.   Nose: Nose normal.   Mouth/Throat: Uvula is midline. Posterior oropharyngeal erythema present. No oropharyngeal exudate or posterior oropharyngeal edema.   Eyes: Conjunctivae are normal.   Cardiovascular: Normal rate, regular rhythm and normal heart sounds.  Exam reveals no gallop and no friction rub.    No murmur heard.  Pulmonary/Chest: Effort normal and breath sounds normal. No respiratory distress. She has no wheezes. She has no rales.   Skin: She is not diaphoretic.   Psychiatric: She has a normal mood and affect. Her behavior is normal. Judgment and thought content normal.   Nursing note and vitals reviewed.      Labs:  Recent Results (from the past 48 hour(s))   Rapid Strep A Screen-Throat   Result Value Ref Range    Rapid Strep A Antigen No Group A Strep detected, presumptive negative No Group A Strep detected, presumptive negative   Influenza A/B Rapid Test   Result Value Ref Range    Influenza  A, Rapid Antigen No Influenza A antigen detected No Influenza A antigen detected    Influenza B, Rapid Antigen No Influenza B antigen detected No Influenza B antigen detected         Assessment:     Procedures    1. Throat pain  Rapid Strep A Screen-Throat    Influenza A/B Rapid Test    Group A Strep, RNA Direct Detection, Throat   2. Eustachian tube dysfunction, left           Patient Instructions   1) Increase fluids and rest  2) Try Flonase, Sudafed, and Neti pot over the counter for congestion relief.  3) Continue taking Ibuprofen for painrelief  4) Salt water gargles and lozenges can be helpful for throat relief  5) You will only be notified of the confirmatory strep results if they are positive.   6) Follow up with your primary care provider if no improvement over the next 5 days.

## 2021-06-17 NOTE — PROGRESS NOTES
ASSESSMENT AND PLAN:  Stephenie Menendez 32 y.o. female is seen here on 05/02/18 for follow-up she has inflammatory arthropathy, undifferentiated.  She has experienced multiple side effects of hydroxychloroquine including GI, hair loss, and she feels her repeated yeast infections may be associated with that.  She read up on methotrexate and is not inclined to starting that.  Overall over the past several months that she has been hydroxychloroquine she does not feel that it has provided any sufficient relief.  For now she is going to discontinue this.  She is going to monitor her symptoms carefully.  Should there be overt swelling that she is able to identify she will take pictures on her cell phone.  She will return for follow-up here in 3 months or sooner.          Diagnoses and all orders for this visit:    Polyarthralgia    Inflammatory polyarthritis      HISTORY OF PRESENTING ILLNESS:  Stephenie Menendez, 32 y.o., female is here for follow-up.  She has inflammatory arthropathy.  She is on hydroxychloroquine.  On her previous visit we had talked about starting methotrexate.  She decided against that.  She feels that hydroxychloroquine has not done much for her.  She feels that her appetite has been lost.  She has been losing hair.  She has had multiple vaginal infections.  All in all she does not think hydroxychloroquine has offered her much.  She is not interested in taking any other medications such as leflunomide.  On her previous visit she had noted intermittent flareups.  Typically affecting the small joints of the hands or wrists and ankles toes.  There is no personal family history of psoriasis rheumatoid arthritis.  She continues to note discomfort at the level of mild-to-moderate intensity and above-noted joints interfering with some of the day-to-day activities with morning stiffness lasting up to 30 minutes.  There is no associated fever or weight loss blurry vision eye redness moccasin nausea no cough  there is no rash.  She has no history of fever blurry vision eye redness mouth also nausea cough there is no rash.  She has noted before no history of psoriasis ulcerative colitis or Crohn's disease.  As for as the reproductive health goes she does not plan any further pregnancy, already has had tubal ligation at the time of her most recent .  Further historical information and ADL limitations as noted in the multidimensional health assessment questionnaire attached in the EMR.          Following is the excerpt from a recent note: .  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  She reports that a year or so ago she started hurting.  This is in her hands and feet.  She points to her DIPs and PIPs as well as the toes to denote the site of pain.  This is worse in the mornings.  She has stiffness that can last up to 30-45 minutes.  Followed by long-standing pain for which she takes ibuprofen.  Since she has to get her kids ready for school she just has to get on with it.  She noted pain level to be moderately severe.  She is involvement of other joints such as her ankles more so on the left side.  She has no personal or family history of psoriasis ulcerative colitis Crohn's disease.  Her parents and are similarly affected with osteoarthritis affecting the small joints of the hands suggest the DIPs.  She did take short course of steroids for combating the side effects and the rash of Cymbalta and that she feels did help her pain significantly.  She has gained 20 pounds of weight recently.  Rest of the 13 system ROS is negative.     ALLERGIES:Sumatriptan and Cymbalta [duloxetine]    PAST MEDICAL/ACTIVE PROBLEMS/MEDICATION/ FAMILY HISTORY/SOCIAL DATA:  The patient has a family history of  Past Medical History:   Diagnosis Date     Anemia      Depression      History   Smoking Status     Never Smoker   Smokeless Tobacco     Never Used     Patient Active Problem List   Diagnosis     Major  "Depression, Recurrent     Palpitations     Fatigue     Anemia     Vitamin D deficiency     Bilateral hand pain     Rash     Polyarthralgia     Inflammatory polyarthritis     Acute neck pain     Hyperreflexia of upper and lower extremity left side     Weakness of left side of body     Current Outpatient Prescriptions   Medication Sig Dispense Refill     cholecalciferol, vitamin D3, 10,000 unit capsule Take 10,000 Units by mouth daily.       hydroxychloroquine (PLAQUENIL) 200 mg tablet Take 1 tablet (200 mg total) by mouth 2 (two) times a day. 180 tablet 0     No current facility-administered medications for this visit.      DETAILED EXAMINATION  05/02/18  :  Vitals:    05/02/18 1235   BP: 108/64   Weight: 148 lb (67.1 kg)   Height: 5' 7\" (1.702 m)     Alert oriented. Head including the face is examined for malar rash, heliotropes, scarring, lupus pernio. Eyes examined for redness such as in episcleritis/scleritis, periorbital lesions.   Neck/ Face examined for parotid gland swelling, range of motion of neck.  Left upper and lower and right upper and lower extremities examined for tenderness, swelling, warmth of the appendicular joints, range of motion, edema, rash.  Some of the important findings included: There is no synovitis in any of the palpable appendical joints of the upper extremities.  No JLT of the knees.  She has a nails painted.  There is no dactylitis of the digits.  There is no impingement of the shoulders.           LAB / IMAGING DATA:  ALT   Date Value Ref Range Status   03/08/2016 18 0 - 45 U/L Final   03/02/2016 18 0 - 45 U/L Final     Albumin   Date Value Ref Range Status   03/08/2016 4.2 3.5 - 5.0 g/dL Final   03/02/2016 4.4 3.5 - 5.0 g/dL Final     Creatinine   Date Value Ref Range Status   03/08/2016 0.78 0.60 - 1.10 mg/dL Final   03/02/2016 0.78 0.60 - 1.10 mg/dL Final   06/20/2015 0.83 0.60 - 1.10 mg/dL Final       WBC   Date Value Ref Range Status   11/21/2017 4.7 4.0 - 11.0 thou/uL Final "   04/13/2017 6.8 4.0 - 11.0 thou/uL Final   06/20/2015 5.0 4.0 - 11.0 thou/uL Final     Hemoglobin   Date Value Ref Range Status   11/21/2017 11.7 (L) 12.0 - 16.0 g/dL Final   04/13/2017 12.6 12.0 - 16.0 g/dL Final   03/29/2017 11.8 (L) 12.0 - 16.0 g/dL Final     Platelets   Date Value Ref Range Status   11/21/2017 233 140 - 440 thou/uL Final   04/13/2017 284 140 - 440 thou/uL Final   03/29/2017 214 140 - 440 thou/uL Final       Lab Results   Component Value Date    RF <15.0 03/29/2017    SEDRATE 2 04/13/2017

## 2021-06-21 NOTE — PROGRESS NOTES
ASSESSMENT AND PLAN:    We spent 40 minutes today in direct patient contact, 100% of the time in consultation concerning medical problems as listed below.     Problem List Items Addressed This Visit        Unprioritized    Major Depression, Recurrent - Primary     Because she is got no rash with Celexa, Cymbalta and Lexapro I am concerned that she may be allergic to all selective serotonin reuptake inhibitor.  I suggested that we start with some psychology and also a trial of Wellbutrin.  I am hopeful that she is not going to have an adverse reaction to the Wellbutrin.  She says she has no family or personal seizure disorder.  She is also due for an annual exam and so I thought maybe we could follow-up in 1 month for both her annual exam and to see how she is doing the Wellbutrin and she is agreeable.         Relevant Medications    buPROPion (WELLBUTRIN XL) 150 MG 24 hr tablet    Other Relevant Orders    Ambulatory referral to Psychology           Chief Complaint   Patient presents with     Medication Management     Isn't currently taking any meds right now, but feels she should be.         HPI  Stephenie Menendez is a 32 y.o. female comes in today saying that she feels like her depression is really bothering her.  She has 3 kids have attention deficit hyperactivity disorder.  At least 2 of them also have autism.  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  However, she does note that her energy level is really bad and has never gotten better.  It is kind of been like this for the last 2-3 years.  She has some rheumatological issues and sees Dr. aPrada on a regular basis.  She tries to eat really healthy diet of fruits, vegetables, nuts, seeds, beef, chicken, corn, rice and just a little bit of dairy.    She has had long-term problems with depression which seemed to start back in third grade when she was bullied.  She is actually gone  to counseling before for depression and was seen.  By a psychologist at [place] who managed the patient had children with autism spectrum disorder.  Unfortunately her insurance no longer covered it and so she could not continue to go there.  In the past she is tried Cymbalta, Celexa and Lexapro but got a rash from all of them and seems to be allergic to them.    Contraception she has a tubal ligation and is interested in starting counseling again.      History   Smoking Status     Never Smoker   Smokeless Tobacco     Never Used      Current Outpatient Prescriptions on File Prior to Visit   Medication Sig Dispense Refill     cholecalciferol, vitamin D3, 10,000 unit capsule Take 10,000 Units by mouth daily.       No current facility-administered medications on file prior to visit.       Allergies   Allergen Reactions     Sumatriptan      Cymbalta [Duloxetine] Rash     THROAT CONSTRICTION         Review of Systems   Constitutional: Negative.    HENT: Negative.    Eyes: Negative.    Respiratory: Negative.    Cardiovascular: Negative.    Gastrointestinal: Negative.    Endocrine: Negative.    Genitourinary: Negative.    Musculoskeletal: Negative.    Skin: Negative.    Neurological: Negative.    Hematological: Negative.    Psychiatric/Behavioral: Negative.         OBJECTIVE: /72  Pulse 72  Resp 14  Wt 149 lb 4.8 oz (67.7 kg)  LMP 10/18/2018 (Exact Date)  Breastfeeding? No  BMI 23.38 kg/m2   Physical Exam   Constitutional: She is oriented to person, place, and time. She appears well-developed and well-nourished. No distress.   HENT:   Head: Normocephalic and atraumatic.   Eyes: Conjunctivae are normal.   Neck: Neck supple.   Cardiovascular: Normal rate and regular rhythm.    Pulmonary/Chest: Effort normal.   Musculoskeletal: Normal range of motion.   Neurological: She is alert and oriented to person, place, and time.   Skin: Skin is warm and dry.   Psychiatric: She has a normal mood and affect.      Little  interest or pleasure in doing things: Several days  Feeling down, depressed, or hopeless: More than half the days  Trouble falling or staying asleep, or sleeping too much: Several days  Feeling tired or having little energy: Nearly every day  Poor appetite or overeating: Several days  Feeling bad about yourself - or that you are a failure or have let yourself or your family down: Several days  Trouble concentrating on things, such as reading the newspaper or watching television: Not at all  Moving or speaking so slowly that other people could have noticed. Or the opposite - being so fidgety or restless that you have been moving around a lot more than usual: Not at all  Thoughts that you would be better off dead, or of hurting yourself in some way: Not at all  PHQ-9 Total Score: 9  If you checked off any problems, how difficult have these problems made it for you to do your work, take care of things at home, or get along with other people?: Very difficult     Orders Placed This Encounter   Procedures     Ambulatory referral to Psychology        This note was created using Dragon dictation.  Please excuse any grammatical errors.

## 2021-06-22 NOTE — PROGRESS NOTES
ASSESSMENT AND PLAN:     Problem List Items Addressed This Visit        Unprioritized    Vitamin D deficiency     d up from 18.2 - 55.7 - continue current supplement.         Preventative health care     See ascus pap w neg hpv in prob list.          Leukopenia     New and unexpected incidental finding.   recheck cbc and peripheral smear         ASCUS of cervix with negative high risk HPV     We did review ASCCP guidelines today.   hpv neg- ascus - repeat pap w hpv in winter 2021         Mixed hyperlipidemia     Lab Results   Component Value Date    CHOL 229 (H) 11/28/2018    CHOL 176 04/02/2014     Lab Results   Component Value Date    HDL 79 11/28/2018    HDL 72 04/02/2014     Lab Results   Component Value Date    LDLCALC 136 (H) 11/28/2018    LDLCALC 96.6 04/02/2014     Lab Results   Component Value Date    TRIG 69 11/28/2018    TRIG 37 04/02/2014     No components found for: CHOLHDL    lipids up - statin decision aid at follow up - statin currently not indicated. Continue to monitor and continue healthy lifestyle.                 Chief Complaint   Patient presents with     Test Results        HPI  Stephenie Menendez is a 33 y.o. female comes in to discuss her ascus pap with neg hpv and elevated lipids.  And to get her blood counts were repeated as they were a bit abnormal last time they were done.      Social History     Tobacco Use   Smoking Status Never Smoker   Smokeless Tobacco Never Used      Current Outpatient Medications on File Prior to Visit   Medication Sig Dispense Refill     buPROPion (WELLBUTRIN XL) 150 MG 24 hr tablet Take 1 tablet (150 mg total) by mouth every morning. 90 tablet 3     cholecalciferol, vitamin D3, 10,000 unit capsule Take 2,000 Units by mouth daily .             No current facility-administered medications on file prior to visit.       Allergies   Allergen Reactions     Sumatriptan      Cymbalta [Duloxetine] Rash     THROAT CONSTRICTION       Review of Systems   Constitutional:  Negative.    HENT: Negative.    Eyes: Negative.    Respiratory: Negative.    Cardiovascular: Negative.    Gastrointestinal: Negative.    Endocrine: Negative.    Genitourinary: Negative.    Musculoskeletal: Negative.    Skin: Negative.    Neurological: Negative.    Hematological: Negative.    Psychiatric/Behavioral: Negative.         OBJECTIVE: /70   Pulse 72   Resp 14   LMP 11/12/2018 (Exact Date)    Physical Exam   Constitutional: She is oriented to person, place, and time. She appears well-developed and well-nourished. No distress.   HENT:   Head: Normocephalic and atraumatic.   Eyes: Conjunctivae are normal.   Neck: Neck supple.   Cardiovascular: Normal rate and regular rhythm.   Pulmonary/Chest: Effort normal.   Musculoskeletal: Normal range of motion.   Neurological: She is alert and oriented to person, place, and time.   Skin: Skin is warm and dry.   Psychiatric: She has a normal mood and affect.        This note was created using Dragon dictation.  Please excuse any grammatical errors.

## 2021-06-25 NOTE — PROGRESS NOTES
Progress Notes by Susy Matthews MD at 4/13/2017  4:40 PM     Author: Susy Matthews MD Service: -- Author Type: Physician    Filed: 4/13/2017  5:42 PM Encounter Date: 4/13/2017 Status: Signed    : Susy Matthews MD (Physician)       ASSESSMENT AND PLAN:  MALAR RASH  Given prednisone as it was thought she may be having allergic reaction to cymbalta as the timing of the rash was within a week of starting cymbalta and within one day of increasing cymbalta dose. However, I started thinking the rash may have different significance when she said the prednisone did not help.  Worried about lupus given her hx over the past 14 months or so of unexplained fatigue and body pain.   Tests ordered today. Also rheumatology consult pending on May 15.   Orders Placed This Encounter   Procedures   ? Antinuclear Antibody (NEY) Cascade   ? DNA DS Screen   ? Erythrocyte Sedimentation Rate   ? C-Reactive Protein   ? HM1 (CBC with Diff)      LOWER LEG RASH  I wonder if this might represent a leukoclastic vasculitis. See tests ordered and rheum consult pending. Did biopsy one of the more itchy lesions today to see if we can get a clear diagnosis.     COUGH  Thought to be allergic reaction to cymbalta as that is known potential side effect in 3% of people.  She stopped cymbalta 4/9/2017Watchful waiting. Hoping this will resolve over the next 5 days but if not gone early next week she will call into clinic and we can proceed with cxr etc.     Chief Complaint   Patient presents with   ? Follow-up     Here to follow up from the allergic reaction she had. Concerned about the cough and breathing issues.      HPI  Stephenie Menendez is a 31 y.o. female comes in today for follow up because she had an allergic reaction to the cymbalta we tried to start.   3/30/2017 - she came into clinic an saw me  3/31/2017 - she developed a mild cough  4/7/2017 - she increased from 1 tab of cymbalta to 2 tabs (from 30 mg to 60mg)  And within  30 min the cough worsened.   4/8/2017 she started developing a rash.   The rash was itchy on her body and hurt on her face. She took pictures of her face - they are scanned to one of her [employer] emails.    History   Smoking Status   ? Never Smoker   Smokeless Tobacco   ? Never Used      Current Outpatient Prescriptions   Medication Sig Dispense Refill   ? cholecalciferol, vitamin D3, 10,000 unit capsule Take 10,000 Units by mouth daily.     ? methylPREDNISolone (MEDROL DOSEPACK) 4 mg tablet follow package directions 21 tablet 0     No current facility-administered medications for this visit.      Allergies   Allergen Reactions   ? Sumatriptan    ? Cymbalta [Duloxetine] Rash     THROAT CONSTRICTION     Review of Systems   Constitutional: Positive for fatigue.   HENT: Positive for voice change.    Eyes: Negative.    Respiratory: Positive for cough.         Weak voice, hoarse   Cardiovascular: Negative.    Gastrointestinal: Negative.    Endocrine: Negative.    Genitourinary: Negative.    Musculoskeletal:        Generalized body aches   Skin: Positive for rash (see hpi).   Allergic/Immunologic:        Believes she is allergic to cymbalta   Neurological: Negative.    Hematological: Negative.    Psychiatric/Behavioral: Negative.          Physical Exam   Constitutional: She is oriented to person, place, and time. She appears well-developed and well-nourished.   HENT:   Head: Normocephalic and atraumatic.       Classic rash in butterfly/ malar distribution.   Eyes: Conjunctivae are normal.   Neck: Neck supple.   Cardiovascular: Normal rate, regular rhythm and normal heart sounds.    Pulmonary/Chest: Effort normal and breath sounds normal.   Abdominal: Soft.   Neurological: She is alert and oriented to person, place, and time.   Skin: Skin is warm and dry. Petechiae noted.   Vascular type rash noted on lower legs bilaterally. She says the rash was all over her arms, trunk and legs before but is now resolving.         Psychiatric: She has a normal mood and affect.

## 2021-06-25 NOTE — PROGRESS NOTES
I met with Stephenie Menendez at the request of Dr. Lanier to recheck her blood pressure.  Blood pressure medications on the MAR were reviewed with patient.    Patient has taken all medications as per usual regimen: N/A  Patient reports tolerating them without any issues or concerns: N/A    Vitals:    06/02/21 1303   BP: 128/78   Patient Site: Left Arm   Patient Position: Sitting   Cuff Size: Adult Regular       Blood pressure was taken, previous encounter was reviewed, recorded blood pressure below 140/90.  Patient was discharged and the note will be sent to the provider for final review.

## 2021-06-26 NOTE — PROGRESS NOTES
Progress Notes by Susy Matthews MD at 11/28/2018 10:00 AM     Author: Susy Matthews MD Service: -- Author Type: Physician    Filed: 11/28/2018 10:52 AM Encounter Date: 11/28/2018 Status: Signed    : Susy Matthews MD (Physician)       FEMALE PREVENTATIVE EXAM    Assessment and Plan:       Problem List Items Addressed This Visit        Unprioritized    Major Depression, Recurrent     Trial wellbutrin 150 mg xr rx 10/29/2018. Follow up today to see how this is going.   She says it is working extremely well so this is refilled.   Plan continue wellbutrin 150 xr.         Relevant Medications    buPROPion (WELLBUTRIN XL) 150 MG 24 hr tablet    Anemia     Lab Results   Component Value Date    HGB 11.7 (L) 11/21/2017     Will recheck.         Relevant Orders    HM1(CBC and Differential)    HM1 (CBC with Diff)    Vitamin D deficiency     Vitamin D, Total (25-Hydroxy)   Date Value Ref Range Status   02/17/2016 18.2 (L) 30.0 - 80.0 ng/mL Final     Will recheck.         Relevant Orders    Vitamin D, Total (25-Hydroxy)    Preventative health care - Primary     Flu shot recommended.  Pap: normal 4/2014 and due again 4/2019 but since she is here now it is offered.   Mammo:  There is no family or personal history, not indicated    Colonoscopy:  There is no family or personal history, not indicated    Std testing desired:  offered  Osteoporosis prevention discussed.  vitamin d levels ordered. Recommend daily calcium and vitamin d intake to keep good bone health. Recommend weight bearing exercise, no tobacco, and limit alcohol  dexa - not indicated.  Recommend sunscreen, exercise, & healthy diet.  Offered tsh, glucose, hgb, lipid  I have had an Advance Directives discussion with the patient.   Body mass index is 23.21 kg/m .   mychart active.            Breast asymmetry in female     Very slight asymmetry of left upper outer breast compared to right.  Feels more lumpy, although there is no discrete lump.   Breast tissue known to go into axilla more than normal so patient also concerned that mammogram may miss affected tissue, so ultrasound warranted.          Relevant Orders    Mammo Diagnostic Left    US Breast Limited (Focal) Left      Other Visit Diagnoses     Needs flu shot        Screening for malignant neoplasm of cervix        Relevant Orders    Gynecologic Cytology (PAP Smear)    Encounter for screening for metabolic disorder        Relevant Orders    Comprehensive Metabolic Panel    Lipid screening        Relevant Orders    Lipid Cascade            Next follow up:  No Follow-up on file.    Immunization Review  Adult Imm Review: Missing doses of flu, but she declined    Social History     Tobacco Use   Smoking Status Never Smoker   Smokeless Tobacco Never Used        I discussed the following with the patient:   Adult Healthy Living: Importance of regular exercise  Healthy nutrition    I have had an Advance Directives discussion with the patient.    Subjective:   Chief Complaint: Stephenie Menendez is an 32 y.o. female here for a preventative health visit.    Healthy Habits  Are you taking a daily aspirin? No  Do you typically exercising at least 40 min, 3-4 times per week?  NO  Do you usually eat at least 4 servings of fruit and vegetables a day, include whole grains and fiber and avoid regularly eating high fat foods? Yes  Have you had an eye exam in the past two years? Yes  Do you see a dentist twice per year? NO  Do you have any concerns regarding sleep? No    Safety Screen  If you own firearms, are they secured in a locked gun cabinet or with trigger locks? The patient does not own any firearms  Do you feel you are safe where you are living?: Yes (10/24/2018  1:28 PM)  Do you feel you are safe in your relationship(s)?: Yes (10/24/2018  1:28 PM)      Review of Systems:  Please see above.  The rest of the review of systems are negative for all systems.     Pap History:   Yes - updated in Problem List and  "Health Maintenance accordingly  Cancer Screening       Status Date      PAP SMEAR Next Due 4/2/2019      Done 4/2/2014 GYNECOLOGIC CYTOLOGY (PAP SMEAR)          Patient Care Team:  Susy Matthews MD as PCP - General (Family Medicine)        History     Reviewed By Date/Time Sections Reviewed    Susy Matthews MD 11/28/2018 10:36 AM Medical    Susy Matthews MD 11/28/2018 10:09 AM Medical, Surgical, Family    Leigh Ann Ta, [facility] 11/28/2018 10:05 AM Obstetric    Leigh Ann Ta [facility] 11/28/2018 10:04 AM Tobacco, Alcohol, Drug Use, Sexual Activity    Leigh Ann Ta, [facility] 11/28/2018 10:03 AM Tobacco            Objective:   Vital Signs:   Visit Vitals  /70   Pulse 80   Resp 16   Ht 5' 6.5\" (1.689 m)   Wt 146 lb (66.2 kg)   LMP 11/12/2018 (Exact Date)   Breastfeeding? No   BMI 23.21 kg/m           PHYSICAL EXAM  Physical Exam   Constitutional: She is oriented to person, place, and time. She appears well-developed and well-nourished. No distress.   HENT:   Head: Normocephalic and atraumatic.   Eyes: Conjunctivae are normal.   Neck: Neck supple.   Cardiovascular: Normal rate, regular rhythm and normal heart sounds.   Pulmonary/Chest: Effort normal and breath sounds normal. Right breast exhibits no inverted nipple, no mass, no nipple discharge, no skin change and no tenderness. Left breast exhibits no inverted nipple, no mass, no nipple discharge, no skin change and no tenderness. Breasts are asymmetrical. There is no breast swelling.   Some asymetry noted it is slight but it is noted that the left breast in the axillary and left upper outer quadrant does have a bit more texture than the right side.   Abdominal: Soft. Bowel sounds are normal.   Genitourinary: No breast tenderness, discharge or bleeding.   Musculoskeletal: Normal range of motion.   Neurological: She is alert and oriented to person, place, and time.   Skin: Skin is warm and dry.   Psychiatric: She has a normal mood and affect.    "            Medication List           Accurate as of 11/28/18 10:51 AM. If you have any questions, ask your nurse or doctor.               CONTINUE taking these medications    buPROPion 150 MG 24 hr tablet  Also known as:  WELLBUTRIN XL  INSTRUCTIONS:  Take 1 tablet (150 mg total) by mouth every morning.        cholecalciferol (vitamin D3) 10,000 unit capsule  INSTRUCTIONS:  Take 10,000 Units by mouth daily.              Where to Get Your Medications      These medications were sent to [facility]pharm[facility] #[id number] - [facility], [address] [address]    Phone:  [phone]     buPROPion 150 MG 24 hr tablet         Additional Screenings Completed Today:

## 2021-07-03 NOTE — ADDENDUM NOTE
Addendum Note by Tino Matthews MD at 4/22/2020  9:40 AM     Author: Tino Matthews MD Service: -- Author Type: Physician    Filed: 4/22/2020  2:48 PM Encounter Date: 4/22/2020 Status: Signed    : Tino Matthews MD (Physician)    Addended by: TINO MATTHEWS on: 4/22/2020 02:48 PM        Modules accepted: Orders

## 2021-07-03 NOTE — ADDENDUM NOTE
Addendum Note by Tino Matthews MD at 5/19/2020  4:46 PM     Author: Tino Matthews MD Service: -- Author Type: Physician    Filed: 5/19/2020  4:46 PM Encounter Date: 4/21/2020 Status: Signed    : Tino Matthews MD (Physician)    Addended by: TINO MATTHEWS on: 5/19/2020 04:46 PM        Modules accepted: Orders

## 2021-07-03 NOTE — ADDENDUM NOTE
Addendum Note by Tino Matthews MD at 11/21/2017  7:06 PM     Author: Tino Matthews MD Service: -- Author Type: Physician    Filed: 11/21/2017  7:06 PM Encounter Date: 11/21/2017 Status: Signed    : Tino Matthews MD (Physician)    Addended by: TINO MATTHEWS on: 11/21/2017 07:06 PM        Modules accepted: Orders

## 2021-07-04 NOTE — PROGRESS NOTES
Progress Notes by Susy Matthews MD at 5/26/2021  1:40 PM     Author: Susy Matthews MD Service: -- Author Type: Physician    Filed: 5/26/2021  2:58 PM Encounter Date: 5/26/2021 Status: Signed    : Susy Matthews MD (Physician)       FEMALE PREVENTATIVE EXAM  25 mins total clinic time was spent with this patient outside and  beyond the prevent with review of the medical record and with documentation.  This time was spent on the day of service.      Assessment and Plan:   Patient has been advised of split billing requirements and indicates understanding: Yes    Problem List Items Addressed This Visit        High    Neck pain     Sees chiropractor. She is happy with this plan. Physical therapy discussed but declined. Currently not a problem.    She would like some flexeril to have on hand for when her pain flares up.  Flexeril #30 given.          Relevant Medications    cyclobenzaprine (FLEXERIL) 5 MG tablet       Unprioritized    Anemia     Will continue to monitor. Has been stable.   recheck hgb q 6 months.   Lab Results   Component Value Date    HGB 11.8 (L) 05/21/2020             Relevant Orders    HM1(CBC and Differential)    Iron and Transferrin Iron Binding Capacity    Ferritin    Vitamin D deficiency - Primary     Vitamin D, Total (25-Hydroxy)   Date Value Ref Range Status   11/28/2018 55.7 30.0 - 80.0 ng/mL Final      She is intermittently taking vitamin D so wants to recheck.          Relevant Orders    Vitamin D, Total (25-Hydroxy)    Polyarthropathy, multiple sites     She has seen Dr. Millan - rheumatology back in 2018, they wanted her to come back in 3 months, but she is not interested. Wants natural therapies. Will refer to Dr. Mcknight for functional medicine consult.          ASCUS of cervix with negative high risk HPV     Will repeat pap today.          Relevant Orders    Gynecologic Cytology (PAP Smear)    Mixed hyperlipidemia     Will recheck. ldl was high in the past. She is  a healthy eater.          Relevant Orders    Lipid Cascade FASTING    Chronic pain of left knee     Went to ortho. They found a cyst that was growing into the ACL. They also noted fissures and cartilage and other derangement. Ortho wanted to drain the cyst.  So then another ortho said that a cortisone injection was better. Had bad reaction to the cortisone injection.  She is not interested in seeing ortho again. Thinks it is covid related.          Personal history of covid-19     gets light headed, winded, heart racing talking, or doing regular house hold activities, sometimes sats down to 93, feels foggy/ confused/ word finding problems/ lost driving, and insomnia since covid    Referral placed to post covid - syndrome   Positive covid was 4/27/2020  Negative antibody test 10/2020    gets light headed, winded, heart racing talking, or doing regular house hold activities, sometimes sats down to 93, feels foggy/ confused/ word finding problems/ lost driving, and insomnia since covid    Cardiac echo also ordered today as well as thyroid         Relevant Orders    Thyroid Cascade    Echo Complete    COVID-19 Nato RBD Daniela and Reflex Titer    Exercise intolerance     Short of breath telling me her symtpoms, and just getting dressed in the clinic after exam.     Consult covid chronic symptom as this started after covid.     Also will check thyroid, cbc/ iron studies, and cardiac echo.     Follow up in 3 months or once the above has been done.          Relevant Orders    Ambulatory Post-COVID Adult referral    Thyroid Cascade    Echo Complete    BMI 26.0-26.9,adult/ overweight     Weight reduction recommended.          RESOLVED: Major Depression, Recurrent     Self discontinued wellbutrin on her own a long time ago. No longer a problem.          RESOLVED: Breast asymmetry in female     Normal breast imaging.          RESOLVED: Leukopenia     Resolved.   Lab Results   Component Value Date    WBC 5.0 05/21/2020                Other Visit Diagnoses     Encounter for screening for metabolic disorder        Relevant Orders    Comprehensive Metabolic Panel             next follow up:  Return in about 3 months (around 8/26/2021) for after tests/ for exercise intolerance. .    Immunization Review  Adult Imm Review: Missing doses of covid vaccine  Social History     Tobacco Use   Smoking Status Never Smoker   Smokeless Tobacco Never Used      I discussed the following with the patient:   Adult Healthy Living: Importance of regular exercise  Healthy nutrition    I have had an Advance Directives discussion with the patient.    Subjective:   Chief Complaint: Stephenie Menendez is an 35 y.o. female here for a preventative health visit.    Patient has been advised of split billing requirements and indicates understanding: Yes  HPI:  Comes in wanting to discuss post covid syndrome.   gets light headed, winded, heart racing talking, or doing regular house hold activities, sometimes sats down to 93, feels foggy/ confused/ word finding problems/ lost driving, and insomnia since covid    Healthy Habits  Are you taking a daily aspirin? No  Do you typically exercising at least 40 min, 3-4 times per week?  NO  Do you usually eat at least 4 servings of fruit and vegetables a day, include whole grains and fiber and avoid regularly eating high fat foods? Yes  Have you had an eye exam in the past two years? NO  Do you see a dentist twice per year? NO  Do you have any concerns regarding sleep? YES    Safety Screen  If you own firearms, are they secured in a locked gun cabinet or with trigger locks? The patient does not own any firearms  Do you feel you are safe where you are living?: Yes (5/26/2021  1:33 PM)  Do you feel you are safe in your relationship(s)?: Yes (5/26/2021  1:33 PM)      Review of Systems:  Please see above.  The rest of the review of systems are negative for all systems.     Pap History:   Yes - updated in Problem List and Health Maintenance  "accordingly  Cancer Screening       Status Date      PAP SMEAR Next Due 11/28/2021      Done 11/28/2018 GYNECOLOGIC CYTOLOGY (PAP SMEAR)     Patient has more history with this topic...          Patient Care Team:  Susy Matthews MD as PCP - General (Family Medicine)  Susy Matthews MD as Assigned PCP        History     Reviewed By Date/Time Sections Reviewed    Susy Matthwes MD 5/26/2021  2:50 PM Family    Susy Matthews MD 5/26/2021  2:47 PM Family    Susy Matthews MD 5/26/2021  1:47 PM Social Documentation    Susy Matthews MD 5/26/2021  1:46 PM Medical, Surgical    Leigh Ann Ta CMA 5/26/2021  1:33 PM Tobacco            Objective:   Vital Signs:   Visit Vitals  /78   Pulse 92   Resp 14   Ht 5' 6.5\" (1.689 m)   Wt 165 lb (74.8 kg)   LMP 05/05/2021 (Exact Date)   Breastfeeding No   BMI 26.23 kg/m           PHYSICAL EXAM  Physical Exam  Constitutional:       General: She is not in acute distress.     Appearance: Normal appearance. She is well-developed.   HENT:      Head: Normocephalic and atraumatic.      Right Ear: Tympanic membrane, ear canal and external ear normal.      Left Ear: Tympanic membrane, ear canal and external ear normal.      Nose: Nose normal.      Mouth/Throat:      Mouth: Mucous membranes are moist.      Pharynx: Oropharynx is clear.   Eyes:      Extraocular Movements: Extraocular movements intact.      Conjunctiva/sclera: Conjunctivae normal.      Pupils: Pupils are equal, round, and reactive to light.   Neck:      Musculoskeletal: Neck supple.   Cardiovascular:      Rate and Rhythm: Normal rate and regular rhythm.      Heart sounds: Normal heart sounds.   Pulmonary:      Effort: Pulmonary effort is normal.      Breath sounds: Normal breath sounds.      Comments: Patient is short of breath just getting dressed after physical exam.   Chest:      Breasts: Breasts are symmetrical.         Right: Normal. No swelling, bleeding, inverted nipple, mass, nipple " discharge, skin change or tenderness.         Left: Normal. No swelling, bleeding, inverted nipple, mass, nipple discharge, skin change or tenderness.   Abdominal:      General: Bowel sounds are normal.      Palpations: Abdomen is soft.   Genitourinary:     General: Normal vulva.      Exam position: Lithotomy position.      Vagina: Normal.      Cervix: Normal.      Uterus: Normal.       Adnexa: Right adnexa normal and left adnexa normal.      Rectum: Normal.   Musculoskeletal: Normal range of motion.   Skin:     General: Skin is warm and dry.   Neurological:      General: No focal deficit present.      Mental Status: She is alert and oriented to person, place, and time.   Psychiatric:         Mood and Affect: Mood normal.         Behavior: Behavior normal.         Thought Content: Thought content normal.         Judgment: Judgment normal.                Medication List          Accurate as of May 26, 2021  2:57 PM. If you have any questions, ask your nurse or doctor.            START taking these medications    cyclobenzaprine 5 MG tablet  Also known as: FLEXERIL  INSTRUCTIONS: Take 2 tablets (10 mg total) by mouth every 8 (eight) hours as needed for muscle spasms.  Started by: Susy Matthews MD           STOP taking these medications    buPROPion 300 MG 24 hr tablet  Also known as: WELLBUTRIN XL  Stopped by: Susy Matthews MD     cholecalciferol (vitamin D3) 250 mcg (10,000 unit) capsule  Stopped by: Susy Matthews MD     diazePAM 5 MG tablet  Also known as: VALIUM  Stopped by: Susy Matthews MD           Where to Get Your Medications      These medications were sent to [facility][id number] [place], [place] - [address]  [address] [address]    Phone: [phone]     cyclobenzaprine 5 MG tablet         Additional Screenings Completed Today:

## 2021-07-06 VITALS
BODY MASS INDEX: 25.9 KG/M2 | HEART RATE: 92 BPM | SYSTOLIC BLOOD PRESSURE: 142 MMHG | RESPIRATION RATE: 14 BRPM | WEIGHT: 165 LBS | DIASTOLIC BLOOD PRESSURE: 78 MMHG | HEIGHT: 67 IN

## 2021-07-06 VITALS — HEIGHT: 67 IN | BODY MASS INDEX: 25.9 KG/M2 | WEIGHT: 165 LBS

## 2021-07-06 VITALS — DIASTOLIC BLOOD PRESSURE: 78 MMHG | SYSTOLIC BLOOD PRESSURE: 128 MMHG

## 2021-07-06 ASSESSMENT — PATIENT HEALTH QUESTIONNAIRE - PHQ9: SUM OF ALL RESPONSES TO PHQ QUESTIONS 1-9: 7

## 2021-07-07 ENCOUNTER — AMBULATORY - HEALTHEAST (OUTPATIENT)
Dept: NURSING | Facility: CLINIC | Age: 36
End: 2021-07-07

## 2021-07-08 ASSESSMENT — ANXIETY QUESTIONNAIRES: GAD7 TOTAL SCORE: 2

## 2021-07-15 ENCOUNTER — VIRTUAL VISIT (OUTPATIENT)
Dept: NEUROLOGY | Facility: CLINIC | Age: 36
End: 2021-07-15
Payer: COMMERCIAL

## 2021-07-15 DIAGNOSIS — R06.09 DYSPNEA ON EXERTION: ICD-10-CM

## 2021-07-15 DIAGNOSIS — G62.9 NEUROPATHY: ICD-10-CM

## 2021-07-15 DIAGNOSIS — R41.3 MEMORY LOSS: Primary | ICD-10-CM

## 2021-07-15 PROCEDURE — 99205 OFFICE O/P NEW HI 60 MIN: CPT | Mod: GT | Performed by: PSYCHIATRY & NEUROLOGY

## 2021-07-15 PROCEDURE — 99417 PROLNG OP E/M EACH 15 MIN: CPT | Performed by: PSYCHIATRY & NEUROLOGY

## 2021-07-15 RX ORDER — BUPROPION HYDROCHLORIDE 300 MG/1
300 TABLET ORAL DAILY
COMMUNITY
Start: 2020-03-03 | End: 2021-11-03

## 2021-07-15 RX ORDER — BUSPIRONE HYDROCHLORIDE 10 MG/1
10 TABLET ORAL 2 TIMES DAILY
COMMUNITY
Start: 2020-05-22 | End: 2021-11-03

## 2021-07-15 RX ORDER — CYCLOBENZAPRINE HCL 5 MG
10 TABLET ORAL EVERY 8 HOURS PRN
COMMUNITY
Start: 2021-05-26 | End: 2021-11-10 | Stop reason: DRUGHIGH

## 2021-07-15 NOTE — PROGRESS NOTES
Stephenie is a 35 year old who is being evaluated via a billable video visit.      How would you like to obtain your AVS? Mail  If the video visit is dropped, the invitation should be resent by: [phone]      Video Start Time: 0700  Video-Visit Details    Type of service:  Video Visit    Video End Time:8:07 AM    Originating Location (pt. Location): Home    Distant Location (provider location):  [facility] NEUROLOGY [facility]     Platform used for Video Visit: Spark[employer]

## 2021-07-15 NOTE — PROGRESS NOTES
"[facility] Neurology Consultation    Stephenie Menendez MRN# [id number]   Age: 35 year old YOB: 1985     Requesting physician: Susy Leonard     Reason for Consultation: Post COVID-19 Clinic      History of Presenting Symptoms:   Stephenie Menendez is a 35 year old female who presents today for evaluation of Post COVID-19 Clinic.  The patient has a pertinent medical history of MDD.       The patient tested positive for COVID-19 on 4/27/2020 with symptoms of flu-like feeling (cough, dyspnea, fever, chills, aches, heart palpitations, and lowered oxygen saturations reported from 3/29/2020 on.  5/21/2020, the patient was seen with her PCP had multiple symptoms surrounding her prior infection such as bruising, dry non-productive cough, dyspnea, diarrhea, tachycardia, and body aches.  Work up included XR chest, serum testing (BMP, Hepatic panel, CRP, CK, DDimer, TSH, CBC, and EKG with start of metoprolol and continuation of albuterol as needed.  Imaging and labs were normal.  By 07/2020, the patient started to feel better regarding fevers, dyspnea, coughing, and malaise.  However, since that time, the patient has continued periodic \"relapses\" of symptoms occurring for weeks on end then resolving.  Repeat COVID testing during Flares of symptoms have returned negative (most recently 5/28/2021, 11/5/2020, 10/2/2020).  This spring, 5/2021, the patient had a return of her symptoms that was quite severe (tachycardia with basic exertion, low fevers 99 degrees, dyspnea with exertion, lowered O2 levels).    5/26/2021, the patient was seen with her PCP and reported light-headedness, dyspnea with exertion, periodic tachycardia, poor O2 saturations with basic exercises, and periods of confusion/word finding difficulties, and getting lost.  Superimposed on these symptoms, the patient had ongoing neck pain for which she was using a chiropractor and was give flexeril for relief.  Her neck pain was said to be " occurring since 2017, but returned following COVID19 infection.  Prior w/up included MRI cervical spine 11/21/2017, and XR cervical spine 10/2/2019 which showed some mild lordosis w/out myelopathy or neural foramina narrowing.  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    Today, the patient reports her biggest concerns are of intermittent word finding difficulties, poor attention, worsened short term memory.  She has difficulty recalling conversations that occurred the same day. She has gotten lost while driving.  Much of her symptoms worsen with stress triggers (extended talking).   The patient also reports a feeling of passing out occurring at times but without characteristic triggers (standing or sitting quickly, with cough, during exercise).  She has not passed out or had syncope with the light-headedness.  She may have fluctuations in ability to control temperature, often feeling either too cold or hot in a constant temperature room.  The patient reports having some larger degree of insomnia in the last few months, and reports waking up frequently through the night (2-3 times per night, stays awake for hours, finally can go back to sleep at 6 am at times).  She takes melatonin if she is experiencing multiple days in a row of insomnia.    She still has a cough that is non-productive, and isn't treating this with any agent.  She has multi-joint pain, most recently occurring with left knee pain.  MRI of her knee showed edema and inflammation in the suprapatellar fat pad.  The patient was seen 7/5/2017 with Rheumatology for multi-joint related pain with negative NEY/RH/CCP, ANCA but positive parvo IgG and moderate improvement with prednisone.  Overall, recommendations for her suspected inflammatory polyarthritis were for a steroid trial and hydroxychloroquine trial.  She developed yeast infections and hair loss  while on medication, and also didn't feel her symptoms were much improved with the medication itself.  Consideration towards methotrexate was made, but the patient was not inclined to start treatment.    The patient feels that her depression is well controlled while off of medication. She actually feels that her changes with COVID19 have been helpful from a life-stress standpoint (feels more at home while at home, less work stress).  She is not interested in re-starting anti-depressant therapies at this time.        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       Medications:     Current Outpatient Medications   Medication Sig     Acetaminophen (TYLENOL PO) Take  by mouth as needed.     albuterol (PROVENTIL HFA: VENTOLIN HFA) 108 (90 BASE) MCG/ACT inhaler Inhale 2 puffs into the lungs every 6 hours as needed for shortness of breath / dyspnea.     amoxicillin (AMOXIL) 500 MG capsule Take 1 capsule by mouth 3 times daily.     GuaiFENesin (MUCINEX PO) Take  by mouth as needed.     ibuprofen (ADVIL,MOTRIN) 400-800 mg tablet Take 400-800 mg by mouth every 6 hours as needed.      Physical Exam:   General: Seated comfortably in no acute distress.  HEENT: Neck supple with normal range of motion.   Skin: No rashes  Neurologic:     Mental Status: Fully alert, attentive and oriented. Speech clear and fluent, minimal paraphasic errors. TICS30 was 23/30 (errors in short term recall.  Able to do serial 7's completely but slowly).  The patient stated 8 words that start with F without any repeats in 1 minutes time.     Cranial Nerves: EOMI with normal smooth  pursuit. Facial movements symmetric. Hearing not formally tested but intact to conversation.  No dysarthria.     Motor: No tremors or other abnormal movements observed.      Sensory: Negative Romberg.          Assessment and Plan:   Assessment:  Post Viral syndrome (post COVID19)   - Inflammatory arthropathy   - Dyspnea with exertion   - Periodic tachycardia   - Temperature dysregulation   - Cognitive deficits (slowed cognitive processing, poor multitasking, poor attention)    The patient describes a post viral syndrome occurring since 07/2020 in an intermittent fashion, often worsened with poor sleep, increased life-stressors, and sometimes just at random.  Some of her sypmtoms sound like a small fiber related issue that can occur from recurrent inflammation and lead to autonomic dysregulation.  Given the lack or acute onset with progression, I am not necessarily concerned for paraneoplastic related disorders but she should have further investigation with serum studies to look for alternative etiologies of small fiber neuroapthy.  If her sypmtosm continue, expansion of testing with epidermal nerve fiber density and paraneoplastic panel and possibly autonomic testing could be considered.    In regards to her joint pain, the patient may have worsened an already present inflammatory arthropathy previously treated or discussed with rheumatology.  She was last seen in 2018 with somewhat minimal symptoms, and she may now benefit from a return visit given her MRI knee and ongoing joint related symptoms.    The patient also describes recurrent dyspnea with exertion and with minimal exertion at that.  She has no pertinent findings on prior CXR but has never had PFT or any ongoing albuterol inhaler.  She would benefit from seeing a pulmonologist for her post-covid related dyspnea.    The patient's cognitive issues could be multifactorial, given her exam today was mostly specific for slowed cognitive processing.  Improving  sleep, making sure depression is well managed, and undergoing OT treatment would be the most beneficial treatments for her symptoms at this point.  There is little to suggest need for brain imaging given her exam and clinical reporting today. However, given her prior cerebellar ectopy and diffuse cognitive issues, if her sypmtoms remain or are not improved with trying melatonin, and with referrals and possible treatments with other providers, then imaging may be considered on our next visit.     Plan:  OT for COVID19 program  Pulmonology referral  Rheumatology referral  Melatonin 5 mg at bedtime (2-3 hours prior to sleep)  Serum studies for small fiber neuropathy:    - A1c, SPEP, Immunofixation, Vitamin B1/B6/B12/D/E, CRP, ESR, CK, Copper, Zinc, Celiac panel    Follow up in Neurology clinic in 3 months or should new concerns arise.    KIANA Ortega D.O.   of Neurology    Total time  today (109 min) in this patient encounter was spent on pre-charting, counseling and/or coordination of care. We reviewed diagnostic results, impressions, and discussed other possible tests if symptoms do not improve. We discussed the implications of the diagnosis, as well as risks and benefits of management options. We reviewed treatment instructions and our scheduled follow-up as specified in the discharge plan. We also discussed the importance of compliance with the chosen course of treatment. The patient is in agreement with this plan and has no further questions.

## 2021-07-15 NOTE — LETTER
"7/15/2021       RE: Stephenie Menendez  [address]  [facility] [address]     Dear Colleague,    Thank you for referring your patient, Stephenie Menendez, to the [facility] NEUROLOGY CLINIC [place] at [facility]. Please see a copy of my visit note below.    [facility] Neurology Consultation    Stephenie Menendez MRN# [id number]   Age: 35 year old YOB: 1985     Requesting physician: Susy Leonard     Reason for Consultation: Post COVID-19 Clinic      History of Presenting Symptoms:   Stephenie Menendez is a 35 year old female who presents today for evaluation of Post COVID-19 Clinic.  The patient has a pertinent medical history of MDD.       The patient tested positive for COVID-19 on 4/27/2020 with symptoms of flu-like feeling (cough, dyspnea, fever, chills, aches, heart palpitations, and lowered oxygen saturations reported from 3/29/2020 on.  5/21/2020, the patient was seen with her PCP had multiple symptoms surrounding her prior infection such as bruising, dry non-productive cough, dyspnea, diarrhea, tachycardia, and body aches.  Work up included XR chest, serum testing (BMP, Hepatic panel, CRP, CK, DDimer, TSH, CBC, and EKG with start of metoprolol and continuation of albuterol as needed.  Imaging and labs were normal.  By 07/2020, the patient started to feel better regarding fevers, dyspnea, coughing, and malaise.  However, since that time, the patient has continued periodic \"relapses\" of symptoms occurring for weeks on end then resolving.  Repeat COVID testing during Flares of symptoms have returned negative (most recently 5/28/2021, 11/5/2020, 10/2/2020).  This spring, 5/2021, the patient had a return of her symptoms that was quite severe (tachycardia with basic exertion, low fevers 99 degrees, dyspnea with exertion, lowered O2 levels).    5/26/2021, the patient was seen with her PCP and reported light-headedness, dyspnea " with exertion, periodic tachycardia, poor O2 saturations with basic exercises, and periods of confusion/word finding difficulties, and getting lost.  Superimposed on these symptoms, the patient had ongoing neck pain for which she was using a chiropractor and was give flexeril for relief.  Her neck pain was said to be occurring since 2017, but returned following COVID19 infection.  Prior w/up included MRI cervical spine 11/21/2017, and XR cervical spine 10/2/2019 which showed some mild lordosis w/out myelopathy or neural foramina narrowing.  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    Today, the patient reports her biggest concerns are of intermittent word finding difficulties, poor attention, worsened short term memory.  She has difficulty recalling conversations that occurred the same day. She has gotten lost while driving.  Much of her symptoms worsen with stress triggers (extended talking).   The patient also reports a feeling of passing out occurring at times but without characteristic triggers (standing or sitting quickly, with cough, during exercise).  She has not passed out or had syncope with the light-headedness.  She may have fluctuations in ability to control temperature, often feeling either too cold or hot in a constant temperature room.  The patient reports having some larger degree of insomnia in the last few months, and reports waking up frequently through the night (2-3 times per night, stays awake for hours, finally can go back to sleep at 6 am at times).  She takes melatonin if she is experiencing multiple days in a row of insomnia.    She still has a cough that is non-productive, and isn't treating this with any agent.  She has multi-joint pain, most recently occurring with left knee pain.  MRI of her knee showed edema and inflammation in the suprapatellar fat pad.  The patient was seen  2017 with Rheumatology for multi-joint related pain with negative NEY/RH/CCP, ANCA but positive parvo IgG and moderate improvement with prednisone.  Overall, recommendations for her suspected inflammatory polyarthritis were for a steroid trial and hydroxychloroquine trial.  She developed yeast infections and hair loss while on medication, and also didn't feel her symptoms were much improved with the medication itself.  Consideration towards methotrexate was made, but the patient was not inclined to start treatment.    The patient feels that her depression is well controlled while off of medication. She actually feels that her changes with COVID19 have been helpful from a life-stress standpoint (feels more at home while at home, less work stress).  She is not interested in re-starting anti-depressant therapies at this time.        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       Medications:     Current Outpatient Medications   Medication Sig     Acetaminophen (TYLENOL PO) Take  by mouth as needed.     albuterol (PROVENTIL HFA: VENTOLIN HFA) 108 (90 BASE) MCG/ACT inhaler Inhale 2 puffs into the lungs every 6 hours as needed for shortness of breath / dyspnea.     amoxicillin (AMOXIL) 500 MG capsule Take 1 capsule by mouth 3 times daily.     GuaiFENesin (MUCINEX PO) Take  by mouth as needed.     ibuprofen (ADVIL,MOTRIN) 400-800 mg tablet Take 400-800 mg by mouth every 6 hours as needed.      Physical Exam:   General: Seated comfortably in no acute distress.  HEENT: Neck supple with normal range of motion.   Skin: No rashes  Neurologic:     Mental  Status: Fully alert, attentive and oriented. Speech clear and fluent, minimal paraphasic errors. TICS30 was 23/30 (errors in short term recall.  Able to do serial 7's completely but slowly).  The patient stated 8 words that start with F without any repeats in 1 minutes time.     Cranial Nerves: EOMI with normal smooth pursuit. Facial movements symmetric. Hearing not formally tested but intact to conversation.  No dysarthria.     Motor: No tremors or other abnormal movements observed.      Sensory: Negative Romberg.          Assessment and Plan:   Assessment:  Post Viral syndrome (post COVID19)   - Inflammatory arthropathy   - Dyspnea with exertion   - Periodic tachycardia   - Temperature dysregulation   - Cognitive deficits (slowed cognitive processing, poor multitasking, poor attention)    The patient describes a post viral syndrome occurring since 07/2020 in an intermittent fashion, often worsened with poor sleep, increased life-stressors, and sometimes just at random.  Some of her sypmtoms sound like a small fiber related issue that can occur from recurrent inflammation and lead to autonomic dysregulation.  Given the lack or acute onset with progression, I am not necessarily concerned for paraneoplastic related disorders but she should have further investigation with serum studies to look for alternative etiologies of small fiber neuroapthy.  If her sypmtosm continue, expansion of testing with epidermal nerve fiber density and paraneoplastic panel and possibly autonomic testing could be considered.    In regards to her joint pain, the patient may have worsened an already present inflammatory arthropathy previously treated or discussed with rheumatology.  She was last seen in 2018 with somewhat minimal symptoms, and she may now benefit from a return visit given her MRI knee and ongoing joint related symptoms.    The patient also describes recurrent dyspnea with exertion and with minimal exertion at that.  She has  no pertinent findings on prior CXR but has never had PFT or any ongoing albuterol inhaler.  She would benefit from seeing a pulmonologist for her post-covid related dyspnea.    The patient's cognitive issues could be multifactorial, given her exam today was mostly specific for slowed cognitive processing.  Improving sleep, making sure depression is well managed, and undergoing OT treatment would be the most beneficial treatments for her symptoms at this point.  There is little to suggest need for brain imaging given her exam and clinical reporting today. However, given her prior cerebellar ectopy and diffuse cognitive issues, if her sypmtoms remain or are not improved with trying melatonin, and with referrals and possible treatments with other providers, then imaging may be considered on our next visit.     Plan:  OT for COVID19 program  Pulmonology referral  Rheumatology referral  Melatonin 5 mg at bedtime (2-3 hours prior to sleep)  Serum studies for small fiber neuropathy:    - A1c, SPEP, Immunofixation, Vitamin B1/B6/B12/D/E, CRP, ESR, CK, Copper, Zinc, Celiac panel    Follow up in Neurology clinic in 3 months or should new concerns arise.    KIANA Ortega D.O.   of Neurology    Total time  today (109 min) in this patient encounter was spent on pre-charting, counseling and/or coordination of care. We reviewed diagnostic results, impressions, and discussed other possible tests if symptoms do not improve. We discussed the implications of the diagnosis, as well as risks and benefits of management options. We reviewed treatment instructions and our scheduled follow-up as specified in the discharge plan. We also discussed the importance of compliance with the chosen course of treatment. The patient is in agreement with this plan and has no further questions.      Again, thank you for allowing me to participate in the care of your patient.      Sincerely,    Bhupinder Ortega, DO

## 2021-07-15 NOTE — PATIENT INSTRUCTIONS
For tests:  OT for COVID19 program  Melatonin 5 mg at bedtime (2-3 hours prior to sleep)  Serum studies: A1c, SPEP, Immunofixation, Vitamin B1/B6/B12/D/E, CRP, ESR, CK, Copper, Zinc, Celiac panel    For referrals:  OT for COVID19 program  Pulmonology for dyspnea with exertion  Rheumatology for ongoing joint pain

## 2021-07-16 ENCOUNTER — TELEPHONE (OUTPATIENT)
Dept: PULMONOLOGY | Facility: CLINIC | Age: 36
End: 2021-07-16

## 2021-07-16 DIAGNOSIS — R06.09 DOE (DYSPNEA ON EXERTION): Primary | ICD-10-CM

## 2021-07-16 NOTE — TELEPHONE ENCOUNTER
Initially called to discuss scheduling CXR prior to NEW appt with Dr. Childs in Oct and realized able to get patient in sooner as she is a PC patient. Able to schedule with Dr. Garcia and appt/testing scheduled as well. Details confirmed with pt.

## 2021-07-19 NOTE — TELEPHONE ENCOUNTER
RECORDS RECEIVED FROM: internal /ce    DATE RECEIVED: 9.16.21    NOTES STATUS DETAILS   OFFICE NOTE from referring provider Bhupinder Villafana DO   OFFICE NOTE from other specialist ce [facility]- 4.27.20      DISCHARGE SUMMARY from hospital na    DISCHARGE REPORT from the ER na    MEDICATION LIST internal     IMAGING  (NEED IMAGES AND REPORTS)     CT SCAN na    CHEST XRAY (CXR) internal  Scheduled 9.16.21   5.21.20    TESTS     PULMONARY FUNCTION TESTING (PFT) internal  Scheduled 9.16.21

## 2021-07-22 ENCOUNTER — LAB (OUTPATIENT)
Dept: LAB | Facility: CLINIC | Age: 36
End: 2021-07-22
Payer: COMMERCIAL

## 2021-07-22 DIAGNOSIS — G62.9 NEUROPATHY: ICD-10-CM

## 2021-07-22 DIAGNOSIS — R41.3 MEMORY LOSS: ICD-10-CM

## 2021-07-22 LAB
C REACTIVE PROTEIN LHE: 0.2 MG/DL (ref 0–0.8)
CK SERPL-CCNC: 210 U/L (ref 30–190)
ERYTHROCYTE [SEDIMENTATION RATE] IN BLOOD BY WESTERGREN METHOD: 4 MM/HR (ref 0–20)
HBA1C MFR BLD: 5.1 % (ref 0–5.6)

## 2021-07-22 PROCEDURE — 84155 ASSAY OF PROTEIN SERUM: CPT | Performed by: PSYCHIATRY & NEUROLOGY

## 2021-07-22 PROCEDURE — 86334 IMMUNOFIX E-PHORESIS SERUM: CPT | Performed by: PSYCHIATRY & NEUROLOGY

## 2021-07-22 PROCEDURE — 86141 C-REACTIVE PROTEIN HS: CPT | Performed by: PSYCHIATRY & NEUROLOGY

## 2021-07-22 PROCEDURE — 86256 FLUORESCENT ANTIBODY TITER: CPT | Mod: 90 | Performed by: PSYCHIATRY & NEUROLOGY

## 2021-07-22 PROCEDURE — 82525 ASSAY OF COPPER: CPT | Mod: 90 | Performed by: PSYCHIATRY & NEUROLOGY

## 2021-07-22 PROCEDURE — 84165 PROTEIN E-PHORESIS SERUM: CPT | Performed by: PSYCHIATRY & NEUROLOGY

## 2021-07-22 PROCEDURE — 36415 COLL VENOUS BLD VENIPUNCTURE: CPT | Performed by: PSYCHIATRY & NEUROLOGY

## 2021-07-22 PROCEDURE — 85652 RBC SED RATE AUTOMATED: CPT | Performed by: PSYCHIATRY & NEUROLOGY

## 2021-07-22 PROCEDURE — 83036 HEMOGLOBIN GLYCOSYLATED A1C: CPT | Performed by: PSYCHIATRY & NEUROLOGY

## 2021-07-22 PROCEDURE — 84630 ASSAY OF ZINC: CPT | Mod: 90 | Performed by: PSYCHIATRY & NEUROLOGY

## 2021-07-22 PROCEDURE — 84446 ASSAY OF VITAMIN E: CPT | Mod: 90 | Performed by: PSYCHIATRY & NEUROLOGY

## 2021-07-22 PROCEDURE — 83516 IMMUNOASSAY NONANTIBODY: CPT | Performed by: PSYCHIATRY & NEUROLOGY

## 2021-07-22 PROCEDURE — 84425 ASSAY OF VITAMIN B-1: CPT | Mod: 90 | Performed by: PSYCHIATRY & NEUROLOGY

## 2021-07-22 PROCEDURE — 99000 SPECIMEN HANDLING OFFICE-LAB: CPT | Performed by: PSYCHIATRY & NEUROLOGY

## 2021-07-22 PROCEDURE — 82550 ASSAY OF CK (CPK): CPT | Performed by: PSYCHIATRY & NEUROLOGY

## 2021-07-22 PROCEDURE — 84207 ASSAY OF VITAMIN B-6: CPT | Mod: 90 | Performed by: PSYCHIATRY & NEUROLOGY

## 2021-07-23 LAB — TOTAL PROTEIN SERUM FOR ELP: 7.3 G/DL

## 2021-07-25 LAB
A-TOCOPHEROL VIT E SERPL-MCNC: 13.7 MG/L
BETA+GAMMA TOCOPHEROL SERPL-MCNC: 0.6 MG/L

## 2021-07-26 LAB
ENDOMYSIUM IGA TITR SER IF: NORMAL {TITER}
TTG IGA SER-ACNC: <0.2 U/ML
TTG IGG SER-ACNC: 1 U/ML

## 2021-07-27 LAB
COPPER SERPL-MCNC: 118.4 UG/DL
PYRIDOXAL PHOS SERPL-SCNC: 54 NMOL/L
ZINC SERPL-MCNC: 77.5 UG/DL

## 2021-07-28 LAB
ALBUMIN PERCENT: 68 % (ref 51–67)
ALBUMIN SERPL ELPH-MCNC: 5 G/DL (ref 3.2–4.7)
ALPHA 1 PERCENT: 2.3 % (ref 2–4)
ALPHA 2 PERCENT: 8.4 % (ref 5–13)
ALPHA1 GLOB SERPL ELPH-MCNC: 0.2 G/DL (ref 0.1–0.3)
ALPHA2 GLOB SERPL ELPH-MCNC: 0.6 G/DL (ref 0.4–0.9)
B-GLOBULIN SERPL ELPH-MCNC: 0.7 G/DL (ref 0.7–1.2)
BETA PERCENT: 9.1 % (ref 10–17)
GAMMA GLOB SERPL ELPH-MCNC: 0.9 G/DL (ref 0.6–1.4)
GAMMA GLOBULIN PERCENT: 12.2 % (ref 9–20)
PATH ICD:: ABNORMAL
PATH ICD:: NORMAL
PROT PATTERN SERPL ELPH-IMP: ABNORMAL
PROT PATTERN SERPL IFE-IMP: NORMAL
REVIEWING PATHOLOGIST: ABNORMAL
REVIEWING PATHOLOGIST: NORMAL
TOTAL PROTEIN SERUM FOR ELP: 7.3 G/DL (ref 6–8)

## 2021-07-29 LAB — VIT B1 PYROPHOSHATE BLD-SCNC: 62 NMOL/L

## 2021-08-03 PROBLEM — M06.4 INFLAMMATORY POLYARTHRITIS (H): Status: RESOLVED | Noted: 2017-08-30 | Resolved: 2018-11-27

## 2021-08-05 ENCOUNTER — HOSPITAL ENCOUNTER (OUTPATIENT)
Dept: OCCUPATIONAL THERAPY | Facility: REHABILITATION | Age: 36
End: 2021-08-05
Attending: PSYCHIATRY & NEUROLOGY
Payer: COMMERCIAL

## 2021-08-05 DIAGNOSIS — Z78.9 DECREASED ACTIVITIES OF DAILY LIVING (ADL): ICD-10-CM

## 2021-08-05 DIAGNOSIS — R41.3 MEMORY LOSS: ICD-10-CM

## 2021-08-05 DIAGNOSIS — Z86.16 PERSONAL HISTORY OF COVID-19: ICD-10-CM

## 2021-08-05 DIAGNOSIS — R68.89 EXERCISE INTOLERANCE: Primary | ICD-10-CM

## 2021-08-05 DIAGNOSIS — Z78.9 IMPAIRED INSTRUMENTAL ACTIVITIES OF DAILY LIVING: ICD-10-CM

## 2021-08-05 DIAGNOSIS — M62.81 GENERALIZED MUSCLE WEAKNESS: ICD-10-CM

## 2021-08-05 PROCEDURE — 97166 OT EVAL MOD COMPLEX 45 MIN: CPT | Mod: GO | Performed by: OCCUPATIONAL THERAPIST

## 2021-08-05 NOTE — PROGRESS NOTES
"   08/05/21 0800   Quick Adds   Type of Visit Initial Outpatient Occupational Therapy Evaluation   General Information   Start Of Care Date 08/05/21   Referring Physician Dr. Bhupinder Ortega   Orders Evaluate and treat as indicated   Orders Date 07/15/21   Medical Diagnosis Covid-19   Onset of Illness/Injury or Date of Surgery 03/28/20   Surgical/Medical History Reviewed Yes   Additional Occupational Profile Info/Pertinent History of Current Problem Patient presents with general weakness, decreased endurance, memory loss and fatigue since Covid 19 infection in March 2020. Patient reports that she was very ill at home until July 2020 when symptoms began to improve. Since then, she has had series of \"flare ups\" with baseline symptoms that are no where near normal function. Patient also reports that prior to Covid, she had some joint pain and possible rheumatological issues and is being evaluated for that as well. She is currently working with neurology and pulmonolgy. Functional limitations include dressing, grooming, hygiene, housework, finance and medication management.   Role/Living Environment   Current Community Support Other/Comments  ( and 3 children ages 13,13,14)   Patient role/Employment history Homemaker   Current Living Environment House   Prior Level Comments Patient was functioning independently prior to Covid 19 infection   Pain   Patient currently in pain Yes   Pain location multiple locations   Pain rating 5   Pain comments Patient has general pain especially in knees, hands and shoulders   Fall Risk Screen   Fall screen completed by OT   Have you fallen 2 or more times in the past year? No   Have you fallen and had an injury in the past year? No   Is patient a fall risk? No   Abuse Screen (yes response referral indicated)   Feels Unsafe at Home or Work/School no   Feels Threatened by Someone no   Does Anyone Try to Keep You From Having Contact with Others or Doing Things Outside Your " Home? no   Physical Signs of Abuse Present no   Cognitive Status Examination   Orientation Orientation to person, place and time   Level of Consciousness Alert   Follows Commands and Answers Questions 100% of the time   Personal Safety and Judgment Intact   Visual Perception   Visual Perception Comments patient reports no vision changes   Sensation   Sensation Comments patient reports numbness and tingling in bilateral UE and thighs and feet   Hand Strength   Left Hand  (pounds) 18 pounds   Right Hand  (pounds) 23 pounds   Left Lateral Pinch (pounds) 4 pounds   Right Lateral Pinch (pounds) 3 pounds   Left Three Point Pinch (pounds) 2 pounds   Right Three Point Pinch (pounds) 2 pounds   Coordination   Upper Extremity Coordination No deficits were identified   Transfer Skill   Level of [place]: Transfers independent   Bathing   Bathing Comments independent but takes increased length of time to complete   Upper Body Dressing   Upper Body Dressing Comments independent but takes increased length of time to complete   Lower Body Dressing   Lower Body Dressing Comments independent but takes increased length of time to complete   Grooming   Grooming Comments independent but takes increased length of time to complete   Activity Tolerance   Activity Tolerance poor   Planned Therapy Interventions   Planned Therapy Interventions ADL training;IADL training;Cognitive skills;Strengthening;Self care/Home management;Therapeutic activities   Intervention Comments Patient would benefit from OT for strengthening of UE's, ADL and IADL management, functional activites. She would also benefit from speech therapy for cognitive evaluation and PT for general strength and endurance as well as balance work. Requested orders for speech and PT today    OT Goal 1   Goal Description Patient will increase bilateral  strength by 10# to increase ease of meal prep   Target Date 09/04/21    OT Goal 2   Goal Description Patient will  increase bilateral pinch strength by 5# to increase ease of basic ADL's   Target Date 09/04/21    OT Goal 3   Goal Description Patient will be able to perform laundry tasks independently   Target Date 10/04/21   OT Goal 4   Goal Description Patient will be able to set up medications with 100% accuracy using a compensatory strategy    Target Date 10/04/21   Clinical Impression   Criteria for Skilled Therapeutic Interventions Met Yes, treatment indicated   OT Diagnosis bilateral UE weakness, decreased activities of daily living, impaired instrumental ADL's, memory loss   Assessment of Occupational Performance 3-5 Performance Deficits   Identified Performance Deficits dressing, grooming, hygiene, meal prep, finance and medication management, cleaning, meal prep, dependent care   Clinical Decision Making (Complexity) Moderate complexity   Therapy Frequency twice a week   Predicted Duration of Therapy Intervention (days/wks) 12 weeks   Risks and Benefits of Treatment have been explained. Yes   Patient, Family & other staff in agreement with plan of care Yes   Clinical Impression Comments Patient has functional limitations due to weakness, fatigue and cognitive changes as a result of Covid 19 infection   Education Assessment   Barriers To Learning No Barriers   Total Evaluation Time   OT Eval, Moderate Complexity Minutes (70780) 35

## 2021-08-12 ENCOUNTER — HOSPITAL ENCOUNTER (OUTPATIENT)
Dept: SPEECH THERAPY | Facility: REHABILITATION | Age: 36
End: 2021-08-12
Attending: PSYCHIATRY & NEUROLOGY
Payer: COMMERCIAL

## 2021-08-12 ENCOUNTER — HOSPITAL ENCOUNTER (OUTPATIENT)
Dept: OCCUPATIONAL THERAPY | Facility: REHABILITATION | Age: 36
End: 2021-08-12
Payer: COMMERCIAL

## 2021-08-12 DIAGNOSIS — R41.3 MEMORY LOSS: Primary | ICD-10-CM

## 2021-08-12 DIAGNOSIS — R41.3 MEMORY LOSS: ICD-10-CM

## 2021-08-12 DIAGNOSIS — Z78.9 DECREASED ACTIVITIES OF DAILY LIVING (ADL): ICD-10-CM

## 2021-08-12 DIAGNOSIS — Z78.9 IMPAIRED INSTRUMENTAL ACTIVITIES OF DAILY LIVING: ICD-10-CM

## 2021-08-12 DIAGNOSIS — R41.841 COGNITIVE COMMUNICATION DEFICIT: Primary | ICD-10-CM

## 2021-08-12 DIAGNOSIS — M62.81 GENERALIZED MUSCLE WEAKNESS: ICD-10-CM

## 2021-08-12 PROCEDURE — 92523 SPEECH SOUND LANG COMPREHEN: CPT | Mod: GN | Performed by: SPEECH-LANGUAGE PATHOLOGIST

## 2021-08-12 PROCEDURE — 92507 TX SP LANG VOICE COMM INDIV: CPT | Mod: GN | Performed by: SPEECH-LANGUAGE PATHOLOGIST

## 2021-08-12 PROCEDURE — 97535 SELF CARE MNGMENT TRAINING: CPT | Mod: GO | Performed by: OCCUPATIONAL THERAPIST

## 2021-08-12 PROCEDURE — 97110 THERAPEUTIC EXERCISES: CPT | Mod: GO | Performed by: OCCUPATIONAL THERAPIST

## 2021-08-12 NOTE — PROGRESS NOTES
"   08/12/21 0700   General Information   Type of Evaluation Cognitive-Linguistic   Type Of Visit Initial   Start Of Care Date 08/12/21   Referring Physician Bhupinder Ortega   Medical Diagnosis Memory Loss R41.3   Onset Of Illness/injury Or Date Of Surgery   (March 2020)   Precautions/Limitations  no known precautions/limitations   Hearing WFL   Surgical/Medical history reviewed Yes   Pertinent History Of Current Problem Patient presents with general weakness, decreased endurance, memory loss and fatigue since Covid 19 infection in March 2020. Patient reports that she was very ill at home until July 2020 when symptoms began to improve. Since then, she has had series of \"flare ups\" with baseline symptoms that are no where near normal function. Patient expressed that she is having trouble with easily becoming confusion, forgetting things and losing her train of thought in conversation, and with word finding.  She expressed concerns that \"it's progressively getting worse as time goes on\".  Patient is currently utilizing compensatory strategies of having a shared calendar with her , putting even small items on her calendar, and delegating tasks such as her kids medication doses/times to her .   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  Patient noted to have slowed responses as time progressed, with reports of headache developing around 3/10.   Patient/family Goals To improve her communication and cognition.   Fall Risk Screen   Fall screen completed by SLP   Have you fallen 2 or more times in the past year? No   Have you fallen and had an injury in the past year? No   Is patient a fall risk? No   Abuse Screen (yes response referral indicated)   Feels Unsafe at Home or Work/School no "   Feels Threatened by Someone no   Does Anyone Try to Keep You From Having Contact with Others or Doing Things Outside Your Home? no   Physical Signs of Abuse Present no   Pain Assessment   Pain Reported No   Speech   Deficits in Phonation None   Deficits in Articulation None   Speech Comments Patient's speech is 100% intelligible in conversation.   Language: Auditory Comprehension (understanding of spoken language)   Functional Assessment Scale (Auditory Comprehension) Minimal Impairment   Comments (Auditory Comprehension) Portions of the Auditory Rehabilitation Memory-Language-Comprehension probes were administered.  Patient able to recall 100% of single stimulus probes, 83% of 2-stimulus probes, 100% of 3-stimulus probes, and 28% of 4-stimulus probes.   Language: Verbal Expression (use of spoken language to express information)   Functional Assessment Scale (Verbal Expression) Mild Impairment   Comments (Verbal Expression) During structured conversation, patient able to fluently describe/explain with intermittent pauses/revisions for word finding.  Patient noted that she does not have the tools currently to overcome this challenge.  A portion of the [place] Naming Test was administered.  Patient able to name 100% of words, with pause for word finding noted on 15% of words.   Reading Comprehension (understanding of written language)   Comments (Reading Comprehension) Not formally assessed.   Written Expression (use of writing to express information)   Comments (Written Expression) Not formally assessed.   Cognitive Status Examination   Attention impaired   Behavioral Observations WFL   Short Term Memory impaired   Standardized cognitive-linguistic assessment [facility])   Cognitive Status Exam Comments Portions of the BT-3 were administered to assess memory.  Patient demonstrated moderate impairments per the following subtests: delayed name recall, delayed face recognition, delayed appointment  recall, and delayed messags.  Unable to complete full assessment due to time constraints and patient developing a headache, which slowed responses.  The [place] Post-Concussion Symptoms Questionnaire was also adminstered with noted severe difficulties in fatigue and memory- see in attached note.   Education Assessment   Barriers to Learning Cognitive   General Therapy Interventions   Planned Therapy Interventions Cognitive Treatment;Language   Cognitive treatment Internal memory strategy training;External memory strategy training;Progressive attention training   Language Auditory comprehension;Verbal expression   Clinical Impression, SLP Eval   Criteria for Skilled Therapeutic Interventions Met (SLP Eval) yes;treatment indicated   SLP Diagnosis Cognitive Communication Deficit   Rehab potential affected by fatigue, physical symptoms   Therapy Frequency 2 times  (initial 2 weeks, decrease to 1x)   Predicted Duration of Therapy Intervention (days/wks) 12 weeks   Risks and Benefits of Treatment have been explained. Yes   Patient, Family & other staff in agreement with plan of care Yes   Clinical Impression Comments Patient presents with mild-moderate cognitive linguistic deficits in auditoary comprehension, verbal expression, memory, attention, and executive function post Covid-19, exacerbated by fatigue, physical, and social-emotional components.  Patient is appropriate for direct speech therapy.   Language/Cognition Goal 1   Goal Identifier 1   Goal Description Patient will recall x2 cognitive-linguistic compensatory strategies HENRY in order to improve cognitive function and manage symptoms by end of POC   Target Date 11/12/21   Language/Cognition Goal 2   Goal Identifier 2   Goal Description Patient will implement x2 cognitive-linguistic compensatory strategies in order to complete moderate level cognitive/memory tasks with 80% accuracy HENRY by end of POC.   Target Date 11/12/21   Language/Cognition Goal 3   Goal  Identifier 3   Goal Description Patient will demonstrate/report HENRY x2 occurrences of modifying day/week to manage fatigue to prevent onset of concussion symptoms by end of POC.   Target Date 11/12/21   Language/Cognition Goal 4   Goal Identifier 4   Goal Description Patient will apply x3 word finding strategies during structured conversation HENRY by end of POC.   Target Date 11/12/21   Language/Cognition Goal 5   Goal Identifier 5   Goal Description Patient will name x15 items in x3 concrete categories HENRY by end of POC.   Target Date 11/12/21   Total Session Time   Sound production with lang comprehension and expression minutes (75953) 50   Total Evaluation Time 50  (10 intervention)        [place] Behavioural Memory Test - Third Edition (RBMT-3)  Portions of the were administered [place] Behavioural Memory Test - Third Edition (RBMT-3) to further assess higher level memory skills to further identify memory strengths/needs in areas of remembering names, prospective memory, verbal memory, visual memory, spatial memory, new learning and orientation/date.    Subtests Raw Score Scaled Score   First and Second Names - Delayed Recall 2 6   Belongings - Delayed Recall NA NA   Appointments - Delayed Recall 2 6   Picture Recognition - Delayed Recognition 14 8   Story - Immediate Recall 8.5 9   Story - Delayed Recall NA NA   Face Recognition - Delayed Recognition 10 6   Route - Immediate Recall 13 11   Route - Delayed Recall 13 11   Messages - Immediate Recall 6 10   Messages - Delayed Recall 5 6   Orientation and Date NA NA   Novel Task - Immediate Recall NA NA   Novel Task - Delayed Recall NA NA   Sum of Scaled Scores N/A NA     INTERPRETATION OF TEST RESULTS: 10  TIME ADMINISTERING TEST: 35  TIME FOR INTERPRETATION AND PREPARATION OF REPORT: 10  TOTAL TIME: 55  Reference:   Tia Rm, Kanwal Licea Clare, Linda, [place], Cockburn, Janet, Victor Manuel Leblanc Tate, Robyn, Irish, Ilana, Vernell, Ravinder and  Mike Hdz  (2008, 2011).  The [facility] Behavioral Memory Test is in its 3rd Edition.  [place]. [employer], [facility].  [facility] Post Concussion Symptoms Questionnaire     0 = not experienced at all   1 = no more of a problem   2 = a mild problem   3 = a moderate problem   4 = a severe problem         Headaches   1  Feelings of dizziness  2  Nausea and/or vomiting   0  Noise sensitivity   3  Sleep disturbance    3  Fatigue, tiring more easily  4    Being irritable, easily angered    2  Feeling depressed or tearful   1  Feeling frustrated or impatient    2  Forgetfulness, poor memory   4  Poor concentration   3  Taking longer to think    3  Blurred vision   0  Light sensitivity     1  Double vision   0  Restlessness    0     Are you experiencing any other difficulties? NA

## 2021-08-19 ENCOUNTER — HOSPITAL ENCOUNTER (OUTPATIENT)
Dept: SPEECH THERAPY | Facility: REHABILITATION | Age: 36
End: 2021-08-19
Payer: COMMERCIAL

## 2021-08-19 DIAGNOSIS — R41.841 COGNITIVE COMMUNICATION DEFICIT: Primary | ICD-10-CM

## 2021-08-19 PROCEDURE — 92507 TX SP LANG VOICE COMM INDIV: CPT | Mod: GN | Performed by: SPEECH-LANGUAGE PATHOLOGIST

## 2021-08-19 NOTE — ADDENDUM NOTE
Encounter addended by: Samina Carlson, SLP on: 8/19/2021 4:15 PM   Actions taken: Charge Capture section accepted

## 2021-08-24 ENCOUNTER — HOSPITAL ENCOUNTER (OUTPATIENT)
Dept: OCCUPATIONAL THERAPY | Facility: REHABILITATION | Age: 36
End: 2021-08-24
Payer: COMMERCIAL

## 2021-08-24 DIAGNOSIS — Z78.9 IMPAIRED INSTRUMENTAL ACTIVITIES OF DAILY LIVING: ICD-10-CM

## 2021-08-24 DIAGNOSIS — M62.81 GENERALIZED MUSCLE WEAKNESS: ICD-10-CM

## 2021-08-24 DIAGNOSIS — R68.89 EXERCISE INTOLERANCE: ICD-10-CM

## 2021-08-24 DIAGNOSIS — Z78.9 DECREASED ACTIVITIES OF DAILY LIVING (ADL): ICD-10-CM

## 2021-08-24 DIAGNOSIS — R41.3 MEMORY LOSS: Primary | ICD-10-CM

## 2021-08-24 PROCEDURE — 97110 THERAPEUTIC EXERCISES: CPT | Mod: GO | Performed by: OCCUPATIONAL THERAPIST

## 2021-08-24 PROCEDURE — 97535 SELF CARE MNGMENT TRAINING: CPT | Mod: GO | Performed by: OCCUPATIONAL THERAPIST

## 2021-08-26 ENCOUNTER — HOSPITAL ENCOUNTER (OUTPATIENT)
Dept: OCCUPATIONAL THERAPY | Facility: REHABILITATION | Age: 36
End: 2021-08-26
Payer: COMMERCIAL

## 2021-08-26 ENCOUNTER — HOSPITAL ENCOUNTER (OUTPATIENT)
Dept: SPEECH THERAPY | Facility: REHABILITATION | Age: 36
End: 2021-08-26
Payer: COMMERCIAL

## 2021-08-26 DIAGNOSIS — Z78.9 DECREASED ACTIVITIES OF DAILY LIVING (ADL): ICD-10-CM

## 2021-08-26 DIAGNOSIS — M62.81 GENERALIZED MUSCLE WEAKNESS: ICD-10-CM

## 2021-08-26 DIAGNOSIS — R41.841 COGNITIVE COMMUNICATION DEFICIT: Primary | ICD-10-CM

## 2021-08-26 DIAGNOSIS — Z78.9 IMPAIRED INSTRUMENTAL ACTIVITIES OF DAILY LIVING: ICD-10-CM

## 2021-08-26 DIAGNOSIS — R41.3 MEMORY LOSS: Primary | ICD-10-CM

## 2021-08-26 PROCEDURE — 92507 TX SP LANG VOICE COMM INDIV: CPT | Mod: GN | Performed by: SPEECH-LANGUAGE PATHOLOGIST

## 2021-08-26 PROCEDURE — 97535 SELF CARE MNGMENT TRAINING: CPT | Mod: GO | Performed by: OCCUPATIONAL THERAPIST

## 2021-09-02 ENCOUNTER — HOSPITAL ENCOUNTER (OUTPATIENT)
Dept: SPEECH THERAPY | Facility: REHABILITATION | Age: 36
End: 2021-09-02
Payer: COMMERCIAL

## 2021-09-02 DIAGNOSIS — R41.841 COGNITIVE COMMUNICATION DEFICIT: Primary | ICD-10-CM

## 2021-09-02 PROCEDURE — 92507 TX SP LANG VOICE COMM INDIV: CPT | Mod: GN | Performed by: SPEECH-LANGUAGE PATHOLOGIST

## 2021-09-03 ENCOUNTER — HOSPITAL ENCOUNTER (OUTPATIENT)
Dept: PHYSICAL THERAPY | Facility: REHABILITATION | Age: 36
End: 2021-09-03
Attending: PSYCHIATRY & NEUROLOGY
Payer: COMMERCIAL

## 2021-09-03 DIAGNOSIS — R68.89 EXERCISE INTOLERANCE: Primary | ICD-10-CM

## 2021-09-03 DIAGNOSIS — M79.18 MYOFASCIAL PAIN: ICD-10-CM

## 2021-09-03 DIAGNOSIS — Z86.16 PERSONAL HISTORY OF COVID-19: ICD-10-CM

## 2021-09-03 DIAGNOSIS — M62.81 GENERALIZED MUSCLE WEAKNESS: ICD-10-CM

## 2021-09-03 PROCEDURE — 97163 PT EVAL HIGH COMPLEX 45 MIN: CPT | Mod: GP | Performed by: PHYSICAL THERAPIST

## 2021-09-03 PROCEDURE — 97110 THERAPEUTIC EXERCISES: CPT | Mod: GP | Performed by: PHYSICAL THERAPIST

## 2021-09-03 NOTE — PROGRESS NOTES
09/03/21 0900   General Information   Type of Visit Initial OP Ortho PT Evaluation   Start of Care Date 09/03/21   Referring Physician Bhupinder Ortega, DO   Date of Order 08/05/21   Certification Required? No   Medical Diagnosis post-covid syndrome   Body Part(s)   Body Part(s) Lumbar Spine/SI;Cervical Spine   Presentation and Etiology   Pertinent history of current problem (include personal factors and/or comorbidities that impact the POC) She is post-Covid in 3/2020. Prior to Covid she did have some chronic pain issues mostly in her hands/feet. She was seeing a rheumatologist and was diagnosed with an inflammatory arthritis. It was being controlled with diet/lifestyle. Then she got Covid and her pain has increased by a lot. It is affecting her neck/shoulders/legs/hands. She did have an episode of really bad swelling on the L knee without any CATRINA. MRI just found swelling and a small cyst. She did get injections in the knee. There was still a lot of stiffness in the knee. Then her R knee started doing the same thing. Her L knee is still much worse than the R knee. She does still get swelling for no reason. There is some numbness feeling around it as well. Transitions are difficult with the knees as well especially with sitting. She is just across the board very weak. She is usually a very strong active person but she isn't able to do much at all. This even includes opening jars. She does have a pulse oximeter at home and does get an increase in heart rate but not too much of a loss of O2 sats. The just finds that the fatigue is just the worst for her even with just normal daily tasks. She hasn't noticed much in the way of eye/hearing/taste but sometimes things won't taste right for her or are dulled a little.    Pain rating (0-10 point scale) Best (/10);Worst (/10);Other   Best (/10) 4   Worst (/10) 8   Pain rating comment today 5   Pain quality A. Sharp;B. Dull;C. Aching;E. Shooting   Frequency of  pain/symptoms A. Constant   Pain/symptoms are: Worse in the morning   Progression of symptoms since onset: Worsened   Fall Risk Screen   Fall screen completed by PT   Have you fallen 2 or more times in the past year? No   Have you fallen and had an injury in the past year? No   Is patient a fall risk? No   Abuse Screen (yes response referral indicated)   Feels Unsafe at Home or Work/School no   Feels Threatened by Someone no   Does Anyone Try to Keep You From Having Contact with Others or Doing Things Outside Your Home? no   Physical Signs of Abuse Present no   System Outcome Measures   Outcome Measures   (LEFS: 33)   Lumbar Spine/SI Objective Findings   Gait/Locomotion very slow and antalgic with WBOS   Balance/Proprioception (Single Leg Stance) feet together iwth EC: unable and very wobbly   Flexion ROM mod restriction but quite painful down and on return.    Hip Flexion (L2) Strength 4/5 B   Knee Flexion Strength 4/5 B   Knee Extension (L3) Strength 4/5 B   Ankle Dorsiflexion (L4) Strength 4/5 B   Cervical Spine   Cervical Right Rotation ROM 62   Cervical Left Rotation ROM 68   Cervical/Thoracic/Shoulder ROM Comments WFL shoulder flexion   Shoulder Shrug (C2-C4) Strength shoulder flexion: 4/5 B   Shoulder Abd (C5) Strength 4/5 B   Elbow Flexion (C5, C6) Strength 4/5 B   Elbow Extension (C7) Strength 4/5 B   Shoulder/Wrist/Hand Strength Comments : 7, 9 # bilaterally (did twice each)   Planned Therapy Interventions   Planned Therapy Interventions manual therapy;neuromuscular re-education;ROM;strengthening;stretching   Clinical Impression   Criteria for Skilled Therapeutic Interventions Met yes, treatment indicated   PT Diagnosis post-Covid syndrome: fatigue, chronic pain, myofascial pain   Clinical Presentation Unstable/Unpredictable   Clinical Decision Making (Complexity) High complexity   Therapy Frequency 1 time/week   Predicted Duration of Therapy Intervention (days/wks) 12 weeks   Risk & Benefits of  therapy have been explained Yes   Patient, Family & other staff in agreement with plan of care Yes   Clinical Impression Comments Pt present to PT with s/s consistent with post-Covid syndrome indluding extreme fatigue, weakness and pain. This is pretty much systemic at the moment and has been steadily getting worse for her. She would benefit from starting more exercise but at a slow pace due to her fatigue. She does have fascial hypomobility as well which will contribute to her symptoms. She is appropriate for skilled PT.    ORTHO GOALS   PT Ortho Eval Goals 1;2;3   Ortho Goal 1   Goal Identifier 1   Goal Description Pt will be able to increase C-rot to >70 deg B to show iimproved spinal mobility in 12 weeks.    Target Date 12/02/21   Ortho Goal 2   Goal Identifier 2   Goal Description Pt will improve exercise tolerance to beable to walk her dog >5x/week in 12 weeks.    Target Date 12/02/21   Ortho Goal 3   Goal Identifier 3   Goal Description Pt will decrease pain level from 4-8/10 to 1-6/10 with ADLs in 12 weeks.    Target Date 12/02/21   Total Evaluation Time   PT Eval, High Complexity Minutes (23809) 25

## 2021-09-09 ENCOUNTER — HOSPITAL ENCOUNTER (OUTPATIENT)
Dept: SPEECH THERAPY | Facility: REHABILITATION | Age: 36
End: 2021-09-09
Payer: COMMERCIAL

## 2021-09-09 DIAGNOSIS — R41.841 COGNITIVE COMMUNICATION DEFICIT: Primary | ICD-10-CM

## 2021-09-09 PROCEDURE — 92507 TX SP LANG VOICE COMM INDIV: CPT | Mod: GN | Performed by: SPEECH-LANGUAGE PATHOLOGIST

## 2021-09-14 ENCOUNTER — HOSPITAL ENCOUNTER (OUTPATIENT)
Dept: SPEECH THERAPY | Facility: REHABILITATION | Age: 36
End: 2021-09-14
Payer: COMMERCIAL

## 2021-09-14 DIAGNOSIS — R41.841 COGNITIVE COMMUNICATION DEFICIT: Primary | ICD-10-CM

## 2021-09-14 PROCEDURE — 92507 TX SP LANG VOICE COMM INDIV: CPT | Mod: GN | Performed by: SPEECH-LANGUAGE PATHOLOGIST

## 2021-09-14 NOTE — PROGRESS NOTES
"Outpatient Speech Language Pathology Progress Note     Patient: Stephenie Menendez  : 1985    Beginning/End Dates of Reporting Period:  21 to 21    Referring Provider: Bhupinder Ortega    Client Self Report: Patient arrived to therapy in good spirits.  She expressed that it was a \"hard day\" and that she was having difficulty recalling anything from the weekend.  She noted that the last few days she has had more instances of \"I just can't even talk at all\" and had one time where she knew what she wanted to say but \"my brain was not communicating with my mouth\".      Goals:  Goal Identifier 1   Goal Description Patient will recall x2 cognitive-linguistic compensatory strategies HENRY in order to improve cognitive function and manage symptoms by end of POC   Target Date 21   Date Met  21   Progress (detail required for progress note): 21: GOAL MET.  Patient has implemented planner and utilizes her phone to aid in recall.     Goal Identifier 2   Goal Description Patient will implement x2 cognitive-linguistic compensatory strategies in order to complete moderate level cognitive/memory tasks with 80% accuracy HENRY by end of POC.   Target Date 21   Date Met      Progress (detail required for progress note): 21: Goal Progressing.  Minimal structured cognitive-linguistic tasks completed to this date; however, patient trained on use of internal memory strategies to visual recall.  Patient trained on x6 word finding strategies and able to apply to structured tasks/conversation given prompting.  Continue goal for increased accuracy, independence, and generalization.     Goal Identifier 3   Goal Description Patient will demonstrate/report HENRY x2 occurrences of modifying day/week to manage fatigue to prevent onset of concussion symptoms by end of POC.   Target Date 21   Date Met      Progress (detail required for progress note): 21: Goal progressing.  Patient reported " flexible implementation of 1 activity on followed by break to manage fatigue/physical/cognitive symptoms.  Continue goal.     Goal Identifier 4   Goal Description Patient will apply x3 word finding strategies during structured conversation HENRY by end of POC.   Target Date 11/12/21   Date Met      Progress (detail required for progress note): 9/14/21: Patient trained in x6 word finding strategies to support functional communication.  Patient able to successfully apply in structured tasks and/or conversation given cues.  Continue goal for increased accuracy, independence, and generalization.     Goal Identifier 5   Goal Description Patient will name x15 items in x3 concrete categories HENRY by end of POC.   Target Date 11/12/21   Date Met      Progress (detail required for progress note): 9/14/21: Goal progressing.  Patient able to name up to x12 items in a concrete category in x1 minutes time with use of organizational strategies.  Continue goal.

## 2021-09-15 ENCOUNTER — HOSPITAL ENCOUNTER (OUTPATIENT)
Dept: PHYSICAL THERAPY | Facility: REHABILITATION | Age: 36
End: 2021-09-15
Payer: COMMERCIAL

## 2021-09-15 ENCOUNTER — OFFICE VISIT (OUTPATIENT)
Dept: FAMILY MEDICINE | Facility: CLINIC | Age: 36
End: 2021-09-15
Payer: COMMERCIAL

## 2021-09-15 VITALS
BODY MASS INDEX: 24.99 KG/M2 | HEART RATE: 76 BPM | SYSTOLIC BLOOD PRESSURE: 128 MMHG | OXYGEN SATURATION: 100 % | HEIGHT: 67 IN | DIASTOLIC BLOOD PRESSURE: 82 MMHG | WEIGHT: 159.2 LBS

## 2021-09-15 DIAGNOSIS — N63.23 BREAST LUMP ON LEFT SIDE AT 5 O'CLOCK POSITION: Primary | ICD-10-CM

## 2021-09-15 DIAGNOSIS — R68.89 EXERCISE INTOLERANCE: Primary | ICD-10-CM

## 2021-09-15 DIAGNOSIS — M62.81 GENERALIZED MUSCLE WEAKNESS: ICD-10-CM

## 2021-09-15 DIAGNOSIS — Z86.16 PERSONAL HISTORY OF COVID-19: ICD-10-CM

## 2021-09-15 DIAGNOSIS — M79.18 MYOFASCIAL PAIN: ICD-10-CM

## 2021-09-15 PROBLEM — F33.9 RECURRENT MAJOR DEPRESSIVE EPISODES (H): Status: ACTIVE | Noted: 2017-02-16

## 2021-09-15 PROBLEM — F43.23 ADJUSTMENT DISORDER WITH MIXED ANXIETY AND DEPRESSED MOOD: Status: ACTIVE | Noted: 2019-12-31

## 2021-09-15 PROBLEM — F41.1 GAD (GENERALIZED ANXIETY DISORDER): Status: ACTIVE | Noted: 2019-12-31

## 2021-09-15 PROBLEM — R00.2 PALPITATIONS: Status: ACTIVE | Noted: 2017-02-16

## 2021-09-15 PROBLEM — F33.41 MAJOR DEPRESSIVE DISORDER, RECURRENT, IN PARTIAL REMISSION (H): Status: ACTIVE | Noted: 2019-12-31

## 2021-09-15 PROCEDURE — 97110 THERAPEUTIC EXERCISES: CPT | Mod: GP | Performed by: PHYSICAL THERAPIST

## 2021-09-15 PROCEDURE — 97140 MANUAL THERAPY 1/> REGIONS: CPT | Mod: GP | Performed by: PHYSICAL THERAPIST

## 2021-09-15 PROCEDURE — 99213 OFFICE O/P EST LOW 20 MIN: CPT | Performed by: FAMILY MEDICINE

## 2021-09-15 PROCEDURE — 97112 NEUROMUSCULAR REEDUCATION: CPT | Mod: GP | Performed by: PHYSICAL THERAPIST

## 2021-09-15 ASSESSMENT — MIFFLIN-ST. JEOR: SCORE: 1436.82

## 2021-09-15 NOTE — PROGRESS NOTES
"Assessment & Plan   Problem List Items Addressed This Visit        Other    Breast lump on left side at 5 o'clock position - Primary     5 oclock position 8mm spherical mobile non tender mass under the areola. Ultrasound ordered         Relevant Orders    US Breast Left Limited 1-3 Quadrants           BMI:   Estimated body mass index is 25.31 kg/m  as calculated from the following:    Height as of this encounter: 1.689 m (5' 6.5\").    Weight as of this encounter: 72.2 kg (159 lb 3.2 oz).       No follow-ups on file.    Susy Matthews MD  [facility]   Stephenie is a 35 year old who presents for the following health issues lump noted a few months ago in left breast. Not changing, and its persistence is bothersome. She says she has no other discomfort associated with the lump.          Objective    /82 (BP Location: Left arm, Patient Position: Sitting, Cuff Size: Adult Regular)   Pulse 76   Ht 1.689 m (5' 6.5\")   Wt 72.2 kg (159 lb 3.2 oz)   SpO2 100%   BMI 25.31 kg/m    Body mass index is 25.31 kg/m .  Physical Exam  Constitutional:       Appearance: Normal appearance.   HENT:      Head: Normocephalic and atraumatic.      Right Ear: Tympanic membrane, ear canal and external ear normal.      Left Ear: Tympanic membrane, ear canal and external ear normal.      Nose: Nose normal.      Mouth/Throat:      Mouth: Mucous membranes are moist.      Pharynx: Oropharynx is clear.   Eyes:      Extraocular Movements: Extraocular movements intact.      Conjunctiva/sclera: Conjunctivae normal.      Pupils: Pupils are equal, round, and reactive to light.   Cardiovascular:      Rate and Rhythm: Normal rate and regular rhythm.      Heart sounds: Normal heart sounds.   Pulmonary:      Effort: Pulmonary effort is normal.      Breath sounds: Normal breath sounds.   Chest:      Breasts: Breasts are symmetrical.         Right: Mass ( 5oclock position 8mm spherical mobile non tender mass " under the areola.) present. No swelling, bleeding, inverted nipple, nipple discharge, skin change or tenderness.         Left: Normal. No swelling, bleeding, inverted nipple, mass, nipple discharge, skin change or tenderness.       Abdominal:      General: Bowel sounds are normal.      Palpations: Abdomen is soft.   Musculoskeletal:         General: Normal range of motion.      Cervical back: Normal range of motion and neck supple.   Skin:     General: Skin is warm and dry.      Capillary Refill: Capillary refill takes less than 2 seconds.   Neurological:      General: No focal deficit present.      Mental Status: She is alert and oriented to person, place, and time.   Psychiatric:         Mood and Affect: Mood normal.         Behavior: Behavior normal.         Thought Content: Thought content normal.         Judgment: Judgment normal.

## 2021-09-16 ENCOUNTER — PRE VISIT (OUTPATIENT)
Dept: PULMONOLOGY | Facility: CLINIC | Age: 36
End: 2021-09-16

## 2021-09-16 ENCOUNTER — ANCILLARY PROCEDURE (OUTPATIENT)
Dept: GENERAL RADIOLOGY | Facility: CLINIC | Age: 36
End: 2021-09-16
Attending: INTERNAL MEDICINE
Payer: COMMERCIAL

## 2021-09-16 ENCOUNTER — OFFICE VISIT (OUTPATIENT)
Dept: PULMONOLOGY | Facility: CLINIC | Age: 36
End: 2021-09-16
Attending: INTERNAL MEDICINE
Payer: COMMERCIAL

## 2021-09-16 VITALS
SYSTOLIC BLOOD PRESSURE: 126 MMHG | HEIGHT: 67 IN | WEIGHT: 155 LBS | DIASTOLIC BLOOD PRESSURE: 77 MMHG | HEART RATE: 72 BPM | OXYGEN SATURATION: 100 % | BODY MASS INDEX: 24.33 KG/M2

## 2021-09-16 DIAGNOSIS — R06.09 DOE (DYSPNEA ON EXERTION): Primary | ICD-10-CM

## 2021-09-16 DIAGNOSIS — R06.09 DOE (DYSPNEA ON EXERTION): ICD-10-CM

## 2021-09-16 DIAGNOSIS — R06.09 DYSPNEA ON EXERTION: ICD-10-CM

## 2021-09-16 DIAGNOSIS — J45.990 EXERCISE-INDUCED BRONCHOCONSTRICTION: Primary | ICD-10-CM

## 2021-09-16 LAB
DLCOUNC-%PRED-PRE: 104 %
DLCOUNC-PRE: 25.63 ML/MIN/MMHG
DLCOUNC-PRED: 24.46 ML/MIN/MMHG
ERV-%PRED-PRE: 113 %
ERV-PRE: 1.41 L
ERV-PRED: 1.24 L
EXPTIME-PRE: 5.93 SEC
FEF2575-%PRED-PRE: 136 %
FEF2575-PRE: 4.62 L/SEC
FEF2575-PRED: 3.38 L/SEC
FEFMAX-%PRED-PRE: 93 %
FEFMAX-PRE: 6.97 L/SEC
FEFMAX-PRED: 7.48 L/SEC
FEV1-%PRED-PRE: 110 %
FEV1-PRE: 3.53 L
FEV1FEV6-PRE: 88 %
FEV1FEV6-PRED: 84 %
FEV1FVC-PRE: 88 %
FEV1FVC-PRED: 84 %
FEV1SVC-PRE: 85 %
FEV1SVC-PRED: 77 %
FIFMAX-PRE: 4.92 L/SEC
FRCPLETH-%PRED-PRE: 85 %
FRCPLETH-PRE: 2.45 L
FRCPLETH-PRED: 2.85 L
FVC-%PRED-PRE: 104 %
FVC-PRE: 4.01 L
FVC-PRED: 3.82 L
IC-%PRED-PRE: 93 %
IC-PRE: 2.74 L
IC-PRED: 2.92 L
RVPLETH-%PRED-PRE: 62 %
RVPLETH-PRE: 1.04 L
RVPLETH-PRED: 1.66 L
TLCPLETH-%PRED-PRE: 95 %
TLCPLETH-PRE: 5.19 L
TLCPLETH-PRED: 5.44 L
VA-%PRED-PRE: 84 %
VA-PRE: 4.72 L
VC-%PRED-PRE: 99 %
VC-PRE: 4.15 L
VC-PRED: 4.16 L

## 2021-09-16 PROCEDURE — 94726 PLETHYSMOGRAPHY LUNG VOLUMES: CPT | Performed by: INTERNAL MEDICINE

## 2021-09-16 PROCEDURE — 71046 X-RAY EXAM CHEST 2 VIEWS: CPT | Mod: GC | Performed by: STUDENT IN AN ORGANIZED HEALTH CARE EDUCATION/TRAINING PROGRAM

## 2021-09-16 PROCEDURE — 99204 OFFICE O/P NEW MOD 45 MIN: CPT | Mod: 25 | Performed by: INTERNAL MEDICINE

## 2021-09-16 PROCEDURE — 94729 DIFFUSING CAPACITY: CPT | Performed by: INTERNAL MEDICINE

## 2021-09-16 PROCEDURE — 94375 RESPIRATORY FLOW VOLUME LOOP: CPT | Performed by: INTERNAL MEDICINE

## 2021-09-16 PROCEDURE — G0463 HOSPITAL OUTPT CLINIC VISIT: HCPCS | Mod: 25

## 2021-09-16 RX ORDER — LEVALBUTEROL TARTRATE 45 UG/1
2 AEROSOL, METERED ORAL EVERY 6 HOURS PRN
Qty: 15 G | Refills: 3 | Status: SHIPPED | OUTPATIENT
Start: 2021-09-16 | End: 2022-11-08

## 2021-09-16 ASSESSMENT — MIFFLIN-ST. JEOR: SCORE: 1425.71

## 2021-09-16 NOTE — PROGRESS NOTES
[facility]  Pulmonary Medicine  Visit Clinic Note  September 16, 2021         ASSESSMENT & PLAN   Mrs. Menendez is a 34yo female with PMH significant for MDD and COVID-19 infection (4/2020) seen today for evaluation of dyspnea on exertion.    # Post COVID airway hyperreactivity  # Exercise induced bronchoconstriction  No prior history of asthma or airway disease, there could be a correlation of her articular symptoms with her current symptoms vs secondary to post COVID. Physical exam reassuring. Lab workup revealed normal TSH, and CBC wit hgb at 12.2 with decreased MCV and MCH. Imaging with CXR do not suggest underlying lung process and TTE with normal EF and no evidence of structural disease. PFTs within normal limits.   -Replace albuterol with levalbuterol to minimize side effects and to use 15-20 minutes before planned physical activity.  -If symptoms persist we would consider a trial of ICS or ICS/LABA.  -Patient to receive influenza vaccine this fall.    RTC PRN.    I explained the lab values, imagings and findings to the patient.  Patient expressed understanding I did not recognize any barriers to the understanding of the patient.    Case was discussed with supervising staff attending, Dr Garcia.    Davonte Olmstead  Internal Medicine Resident PG-Y3  Pager: [phone]     48 minutes spent on the date of the encounter doing chart review, history and exam, documentation and further activities as noted above.    I was present with the resident who participated in the service and in the documentation of the note. I have verified the history and personally performed the physical exam and medical decision making. I agree with the assessment and plan of care as documented in the note.    Mychal Garcia MD       Today's visit note:     Chief Complaint: Stephenie Menendez is a 35 year old year old female who is being seen for New Patient (New Covid pt THOMAS)      HPI:   Mrs. Menendez is a 34yo female  "with PMH significant for MDD and COVID-19 infection (4/2020) seen today for evaluation of dyspnea on exertion.    History is obtained from the chart, copied from neurology evaluation 7/2021 and patient.    \"The patient tested positive for COVID-19 on 4/27/2020 with symptoms of flu-like feeling (cough, dyspnea, fever, chills, aches, heart palpitations, and lowered oxygen saturations reported from 3/2020. On 5/21/2020, the patient was seen with her PCP had multiple symptoms surrounding her prior infection such as bruising, dry non-productive cough, dyspnea, diarrhea, tachycardia, and body aches.  Work up included XR chest, serum testing (BMP, Hepatic panel, CRP, CK, DDimer, TSH, CBC, and EKG with start of metoprolol and continuation of albuterol as needed.  Imaging and labs were normal.  By 07/2020, the patient started to feel better regarding fevers, dyspnea, coughing, and malaise.  However, since that time, the patient has continued periodic \"relapses\" of symptoms occurring for weeks on end then resolving.  Repeat COVID testing during Flares of symptoms have returned negative (most recently 5/28/2021, 11/5/2020, 10/2/2020).  This spring, 5/2021, the patient had a return of her symptoms that was quite severe (tachycardia with basic exertion, low fevers 99 degrees, dyspnea with exertion, lowered O2 levels).\"    Today, she reports that her breathing is fine, but it has this waxing and waning pattern that will get dyspnea on exertion with varying degrees of activities, and currently would take 1-3 flights of stairs to developed shortness of breath associated with dry coughing and chest tightness with occasional wheezing that would also be reproducible with triggers such as laughter, emotions (acute stress) and symptoms were generally worse during winter, not with exposures to strong fragrances/odor, or specific environment/occupation. Notes no sputum production. Notes no exercise tolerance for these symptoms. Denies " history of asthma or use of inhalers during childhood. She currently uses albuterol rarely, mostly because feels jittery and shaky, but it does provide relief of symptoms. Denies heartburn or sinuses symptoms such as runny nose or post nasal drip.    She has also reported increased joint pains, most recently occurring with left knee pain.  MRI of her knee showed edema and inflammation in the suprapatellar fat pad. The patient was seen 7/5/2017 with Rheumatology for multi-joint related pain with negative NEY/RH/CCP, ANCA but positive parvo IgG and moderate improvement with prednisone. Overall, recommendations for her suspected inflammatory polyarthritis were for a steroid trial and hydroxychloroquine trial. She developed yeast infections and hair loss while on medication, and also didn't feel her symptoms were much improved with the medication itself. Consideration towards methotrexate was made, but the patient was not inclined to start treatment.    Social history  -Never smoker.  -Drinks 1 glass of wine during the weekends.  -No prior or current recreational drug use.  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 No significant exposures at work.  -2 birds at home (periquites), has feather comforters, not pillows. No mold, hot tub humidifier, brass/woodwind instruments. No asbestos/silica exposure that she recalls.    Family history  No history of lung disease.         Medications:     Current Outpatient Medications   Medication     Acetaminophen (TYLENOL PO)     albuterol (PROVENTIL HFA: VENTOLIN HFA) 108 (90 BASE) MCG/ACT inhaler     cyclobenzaprine (FLEXERIL) 5 MG tablet     ibuprofen (ADVIL,MOTRIN) 400-800 mg tablet     amoxicillin (AMOXIL) 500 MG capsule     buPROPion (WELLBUTRIN XL) 300 MG 24 hr tablet     busPIRone (BUSPAR) 10 MG tablet     GuaiFENesin (MUCINEX PO)     No current facility-administered medications for this  "visit.            Review of Systems:       A complete 10 point review of systems was otherwise negative except as noted in the HPI.        PHYSICAL EXAM:  /77 (BP Location: Right arm, Patient Position: Chair, Cuff Size: Adult Regular)   Pulse 72   Ht 1.702 m (5' 7\")   Wt 70.3 kg (155 lb)   SpO2 100%   BMI 24.28 kg/m       General: Well developed, well nourished, No apparent distress  Eyes: Anicteric  Nose: Nasal mucosa with no edema or hyperemia.  No polyps  Ears: Hearing grossly normal  Mouth: Oral mucosa is moist, without any lesions. No oropharyngeal exudate.  Neck: supple, no thyromegaly  Lymphatics: No cervical or supraclavicular nodes  Respiratory: Good air movement. No crackles. No rhonchi. No wheezes  Cardiac: RRR, normal S1, S2. No murmurs. No JVD  Abdomen: Soft, NT/ND  Musculoskeletal: Extremities normal. No clubbing. No cyanosis. No edema.  Skin: No rash on limited exam  Neuro: Normal mentation. Normal speech.  Psych:Normal affect           Data:   All laboratory and imaging data reviewed.      PFT:   Normal spirometry, lung volumes and diffusion capacity. I personally reviewed and interpreted the PFTs.    CXR: Normal CXR, no evidence of interstitial or airspace opacities. No pleural effusions or atelectasis. Diaphragm in adequate position.    Recent Results (from the past 168 hour(s))   Pulmonary function test    Collection Time: 09/16/21 12:56 PM   Result Value Ref Range    FVC-Pred 3.82 L    FVC-Pre 4.01 L    FVC-%Pred-Pre 104 %    FEV1-Pre 3.53 L    FEV1-%Pred-Pre 110 %    FEV1FVC-Pred 84 %    FEV1FVC-Pre 88 %    FEFMax-Pred 7.48 L/sec    FEFMax-Pre 6.97 L/sec    FEFMax-%Pred-Pre 93 %    FEF2575-Pred 3.38 L/sec    FEF2575-Pre 4.62 L/sec    HRV8548-%Pred-Pre 136 %    ExpTime-Pre 5.93 sec    FIFMax-Pre 4.92 L/sec    VC-Pred 4.16 L    VC-Pre 4.15 L    VC-%Pred-Pre 99 %    IC-Pred 2.92 L    IC-Pre 2.74 L    IC-%Pred-Pre 93 %    ERV-Pred 1.24 L    ERV-Pre 1.41 L    ERV-%Pred-Pre 113 %    " FEV1FEV6-Pred 84 %    FEV1FEV6-Pre 88 %    FRCPleth-Pred 2.85 L    FRCPleth-Pre 2.45 L    FRCPleth-%Pred-Pre 85 %    RVPleth-Pred 1.66 L    RVPleth-Pre 1.04 L    RVPleth-%Pred-Pre 62 %    TLCPleth-Pred 5.44 L    TLCPleth-Pre 5.19 L    TLCPleth-%Pred-Pre 95 %    DLCOunc-Pred 24.46 ml/min/mmHg    DLCOunc-Pre 25.63 ml/min/mmHg    DLCOunc-%Pred-Pre 104 %    VA-Pre 4.72 L    VA-%Pred-Pre 84 %    FEV1SVC-Pred 77 %    FEV1SVC-Pre 85 %

## 2021-09-16 NOTE — LETTER
9/16/2021         RE: Stephenie Menendez  [address]  [facility] [address]        Dear Colleague,    Thank you for referring your patient, Stephenie Menendez, to the [facility] FOR LUNG SCIENCE AND [facility] [place]. Please see a copy of my visit note below.    [facility]  Pulmonary Medicine  Visit Clinic Note  September 16, 2021         ASSESSMENT & PLAN   Mrs. Menendez is a 34yo female with PMH significant for MDD and COVID-19 infection (4/2020) seen today for evaluation of dyspnea on exertion.    # Post COVID airway hyperreactivity  # Exercise induced bronchoconstriction  No prior history of asthma or airway disease, there could be a correlation of her articular symptoms with her current symptoms vs secondary to post COVID. Physical exam reassuring. Lab workup revealed normal TSH, and CBC wit hgb at 12.2 with decreased MCV and MCH. Imaging with CXR do not suggest underlying lung process and TTE with normal EF and no evidence of structural disease. PFTs within normal limits.   -Replace albuterol with levalbuterol to minimize side effects and to use 15-20 minutes before planned physical activity.  -If symptoms persist we would consider a trial of ICS or ICS/LABA.  -Patient to receive influenza vaccine this fall.    RTC PRN.    I explained the lab values, imagings and findings to the patient.  Patient expressed understanding I did not recognize any barriers to the understanding of the patient.    Case was discussed with supervising staff attending, Dr Garcia.    Davonte Olmstead  Internal Medicine Resident PG-Y3  Pager: [phone]     48 minutes spent on the date of the encounter doing chart review, history and exam, documentation and further activities as noted above.    I was present with the resident who participated in the service and in the documentation of the note. I have verified the history and personally performed the physical exam and medical  "decision making. I agree with the assessment and plan of care as documented in the note.    Mychal Garcia MD       Today's visit note:     Chief Complaint: Stephenie Menendez is a 35 year old year old female who is being seen for New Patient (New Covid pt THOMAS)      HPI:   Mrs. Menendez is a 36yo female with PMH significant for MDD and COVID-19 infection (4/2020) seen today for evaluation of dyspnea on exertion.    History is obtained from the chart, copied from neurology evaluation 7/2021 and patient.    \"The patient tested positive for COVID-19 on 4/27/2020 with symptoms of flu-like feeling (cough, dyspnea, fever, chills, aches, heart palpitations, and lowered oxygen saturations reported from 3/2020. On 5/21/2020, the patient was seen with her PCP had multiple symptoms surrounding her prior infection such as bruising, dry non-productive cough, dyspnea, diarrhea, tachycardia, and body aches.  Work up included XR chest, serum testing (BMP, Hepatic panel, CRP, CK, DDimer, TSH, CBC, and EKG with start of metoprolol and continuation of albuterol as needed.  Imaging and labs were normal.  By 07/2020, the patient started to feel better regarding fevers, dyspnea, coughing, and malaise.  However, since that time, the patient has continued periodic \"relapses\" of symptoms occurring for weeks on end then resolving.  Repeat COVID testing during Flares of symptoms have returned negative (most recently 5/28/2021, 11/5/2020, 10/2/2020).  This spring, 5/2021, the patient had a return of her symptoms that was quite severe (tachycardia with basic exertion, low fevers 99 degrees, dyspnea with exertion, lowered O2 levels).\"    Today, she reports that her breathing is fine, but it has this waxing and waning pattern that will get dyspnea on exertion with varying degrees of activities, and currently would take 1-3 flights of stairs to developed shortness of breath associated with dry coughing and chest tightness with occasional wheezing that " would also be reproducible with triggers such as laughter, emotions (acute stress) and symptoms were generally worse during winter, not with exposures to strong fragrances/odor, or specific environment/occupation. Notes no sputum production. Notes no exercise tolerance for these symptoms. Denies history of asthma or use of inhalers during childhood. She currently uses albuterol rarely, mostly because feels jittery and shaky, but it does provide relief of symptoms. Denies heartburn or sinuses symptoms such as runny nose or post nasal drip.    She has also reported increased joint pains, most recently occurring with left knee pain.  MRI of her knee showed edema and inflammation in the suprapatellar fat pad. The patient was seen 7/5/2017 with Rheumatology for multi-joint related pain with negative NEY/RH/CCP, ANCA but positive parvo IgG and moderate improvement with prednisone. Overall, recommendations for her suspected inflammatory polyarthritis were for a steroid trial and hydroxychloroquine trial. She developed yeast infections and hair loss while on medication, and also didn't feel her symptoms were much improved with the medication itself. Consideration towards methotrexate was made, but the patient was not inclined to start treatment.    Social history  -Never smoker.  -Drinks 1 glass of wine during the weekends.  -No prior or current recreational drug use.  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 No significant exposures at work.  -2 birds at home (periquites), has feather comforters, not pillows. No mold, hot tub humidifier, brass/woodwind instruments. No asbestos/silica exposure that she recalls.    Family history  No history of lung disease.         Medications:     Current Outpatient Medications   Medication     Acetaminophen (TYLENOL PO)     albuterol (PROVENTIL HFA: VENTOLIN HFA) 108 (90 BASE) MCG/ACT inhaler      "cyclobenzaprine (FLEXERIL) 5 MG tablet     ibuprofen (ADVIL,MOTRIN) 400-800 mg tablet     amoxicillin (AMOXIL) 500 MG capsule     buPROPion (WELLBUTRIN XL) 300 MG 24 hr tablet     busPIRone (BUSPAR) 10 MG tablet     GuaiFENesin (MUCINEX PO)     No current facility-administered medications for this visit.            Review of Systems:       A complete 10 point review of systems was otherwise negative except as noted in the HPI.        PHYSICAL EXAM:  /77 (BP Location: Right arm, Patient Position: Chair, Cuff Size: Adult Regular)   Pulse 72   Ht 1.702 m (5' 7\")   Wt 70.3 kg (155 lb)   SpO2 100%   BMI 24.28 kg/m       General: Well developed, well nourished, No apparent distress  Eyes: Anicteric  Nose: Nasal mucosa with no edema or hyperemia.  No polyps  Ears: Hearing grossly normal  Mouth: Oral mucosa is moist, without any lesions. No oropharyngeal exudate.  Neck: supple, no thyromegaly  Lymphatics: No cervical or supraclavicular nodes  Respiratory: Good air movement. No crackles. No rhonchi. No wheezes  Cardiac: RRR, normal S1, S2. No murmurs. No JVD  Abdomen: Soft, NT/ND  Musculoskeletal: Extremities normal. No clubbing. No cyanosis. No edema.  Skin: No rash on limited exam  Neuro: Normal mentation. Normal speech.  Psych:Normal affect           Data:   All laboratory and imaging data reviewed.      PFT:   Normal spirometry, lung volumes and diffusion capacity. I personally reviewed and interpreted the PFTs.    CXR: Normal CXR, no evidence of interstitial or airspace opacities. No pleural effusions or atelectasis. Diaphragm in adequate position.    Recent Results (from the past 168 hour(s))   Pulmonary function test    Collection Time: 09/16/21 12:56 PM   Result Value Ref Range    FVC-Pred 3.82 L    FVC-Pre 4.01 L    FVC-%Pred-Pre 104 %    FEV1-Pre 3.53 L    FEV1-%Pred-Pre 110 %    FEV1FVC-Pred 84 %    FEV1FVC-Pre 88 %    FEFMax-Pred 7.48 L/sec    FEFMax-Pre 6.97 L/sec    FEFMax-%Pred-Pre 93 %    " FEF2575-Pred 3.38 L/sec    FEF2575-Pre 4.62 L/sec    WWN1503-%Pred-Pre 136 %    ExpTime-Pre 5.93 sec    FIFMax-Pre 4.92 L/sec    VC-Pred 4.16 L    VC-Pre 4.15 L    VC-%Pred-Pre 99 %    IC-Pred 2.92 L    IC-Pre 2.74 L    IC-%Pred-Pre 93 %    ERV-Pred 1.24 L    ERV-Pre 1.41 L    ERV-%Pred-Pre 113 %    FEV1FEV6-Pred 84 %    FEV1FEV6-Pre 88 %    FRCPleth-Pred 2.85 L    FRCPleth-Pre 2.45 L    FRCPleth-%Pred-Pre 85 %    RVPleth-Pred 1.66 L    RVPleth-Pre 1.04 L    RVPleth-%Pred-Pre 62 %    TLCPleth-Pred 5.44 L    TLCPleth-Pre 5.19 L    TLCPleth-%Pred-Pre 95 %    DLCOunc-Pred 24.46 ml/min/mmHg    DLCOunc-Pre 25.63 ml/min/mmHg    DLCOunc-%Pred-Pre 104 %    VA-Pre 4.72 L    VA-%Pred-Pre 84 %    FEV1SVC-Pred 77 %    FEV1SVC-Pre 85 %                                     Again, thank you for allowing me to participate in the care of your patient.        Sincerely,        Mycahl Garcia MD

## 2021-09-16 NOTE — NURSING NOTE
Chief Complaint   Patient presents with     New Patient     New Covid pt THOMAS     Vitals were taken and medications were reconciled.     Danielle Mg RMA  1:45 PM

## 2021-09-23 ENCOUNTER — TELEPHONE (OUTPATIENT)
Dept: SPEECH THERAPY | Facility: REHABILITATION | Age: 36
End: 2021-09-23

## 2021-09-23 NOTE — TELEPHONE ENCOUNTER
Attempted to call patient regarding no show for speech therapy appointment on today's date.  No answer and voicemail was full so unable to leave a message.    Samina Carlson M.S. CCC-SLP

## 2021-09-24 ENCOUNTER — HOSPITAL ENCOUNTER (OUTPATIENT)
Dept: PHYSICAL THERAPY | Facility: REHABILITATION | Age: 36
End: 2021-09-24
Payer: COMMERCIAL

## 2021-09-24 DIAGNOSIS — R68.89 EXERCISE INTOLERANCE: Primary | ICD-10-CM

## 2021-09-24 DIAGNOSIS — M79.18 MYOFASCIAL PAIN: ICD-10-CM

## 2021-09-24 DIAGNOSIS — M62.81 GENERALIZED MUSCLE WEAKNESS: ICD-10-CM

## 2021-09-24 DIAGNOSIS — Z86.16 PERSONAL HISTORY OF COVID-19: ICD-10-CM

## 2021-09-24 PROCEDURE — 97112 NEUROMUSCULAR REEDUCATION: CPT | Mod: GP | Performed by: PHYSICAL THERAPIST

## 2021-09-24 PROCEDURE — 97140 MANUAL THERAPY 1/> REGIONS: CPT | Mod: GP | Performed by: PHYSICAL THERAPIST

## 2021-09-24 PROCEDURE — 97110 THERAPEUTIC EXERCISES: CPT | Mod: GP | Performed by: PHYSICAL THERAPIST

## 2021-09-27 ENCOUNTER — HOSPITAL ENCOUNTER (OUTPATIENT)
Dept: ULTRASOUND IMAGING | Facility: CLINIC | Age: 36
End: 2021-09-27
Attending: FAMILY MEDICINE
Payer: COMMERCIAL

## 2021-09-27 ENCOUNTER — HOSPITAL ENCOUNTER (OUTPATIENT)
Dept: MAMMOGRAPHY | Facility: CLINIC | Age: 36
End: 2021-09-27
Attending: FAMILY MEDICINE
Payer: COMMERCIAL

## 2021-09-27 DIAGNOSIS — N63.23 BREAST LUMP ON LEFT SIDE AT 5 O'CLOCK POSITION: ICD-10-CM

## 2021-09-27 DIAGNOSIS — N64.89 BREAST ASYMMETRY: ICD-10-CM

## 2021-09-27 PROCEDURE — 77062 BREAST TOMOSYNTHESIS BI: CPT

## 2021-09-27 PROCEDURE — 76642 ULTRASOUND BREAST LIMITED: CPT | Mod: LT

## 2021-09-30 ENCOUNTER — HOSPITAL ENCOUNTER (OUTPATIENT)
Dept: SPEECH THERAPY | Facility: REHABILITATION | Age: 36
End: 2021-09-30
Payer: COMMERCIAL

## 2021-09-30 DIAGNOSIS — R41.841 COGNITIVE COMMUNICATION DEFICIT: Primary | ICD-10-CM

## 2021-09-30 PROCEDURE — 92507 TX SP LANG VOICE COMM INDIV: CPT | Mod: GN | Performed by: SPEECH-LANGUAGE PATHOLOGIST

## 2021-10-01 ENCOUNTER — HOSPITAL ENCOUNTER (OUTPATIENT)
Dept: PHYSICAL THERAPY | Facility: REHABILITATION | Age: 36
End: 2021-10-01
Payer: COMMERCIAL

## 2021-10-01 DIAGNOSIS — M62.81 GENERALIZED MUSCLE WEAKNESS: ICD-10-CM

## 2021-10-01 DIAGNOSIS — R68.89 EXERCISE INTOLERANCE: Primary | ICD-10-CM

## 2021-10-01 DIAGNOSIS — Z86.16 PERSONAL HISTORY OF COVID-19: ICD-10-CM

## 2021-10-01 DIAGNOSIS — M79.18 MYOFASCIAL PAIN: ICD-10-CM

## 2021-10-01 PROCEDURE — 97140 MANUAL THERAPY 1/> REGIONS: CPT | Mod: GP | Performed by: PHYSICAL THERAPIST

## 2021-10-01 PROCEDURE — 97110 THERAPEUTIC EXERCISES: CPT | Mod: GP | Performed by: PHYSICAL THERAPIST

## 2021-10-01 PROCEDURE — 97112 NEUROMUSCULAR REEDUCATION: CPT | Mod: GP | Performed by: PHYSICAL THERAPIST

## 2021-10-01 NOTE — PROGRESS NOTES
Fascial release using Strain-Counterstrain of B cranial/cerivcal/upper thor-LV, B post-axil row-LV

## 2021-10-02 ENCOUNTER — HEALTH MAINTENANCE LETTER (OUTPATIENT)
Age: 36
End: 2021-10-02

## 2021-10-06 ENCOUNTER — HOSPITAL ENCOUNTER (OUTPATIENT)
Dept: PHYSICAL THERAPY | Facility: REHABILITATION | Age: 36
End: 2021-10-06
Payer: COMMERCIAL

## 2021-10-06 DIAGNOSIS — M79.18 MYOFASCIAL PAIN: ICD-10-CM

## 2021-10-06 DIAGNOSIS — R68.89 EXERCISE INTOLERANCE: Primary | ICD-10-CM

## 2021-10-06 DIAGNOSIS — Z86.16 PERSONAL HISTORY OF COVID-19: ICD-10-CM

## 2021-10-06 DIAGNOSIS — M62.81 GENERALIZED MUSCLE WEAKNESS: ICD-10-CM

## 2021-10-06 PROCEDURE — 97112 NEUROMUSCULAR REEDUCATION: CPT | Mod: GP | Performed by: PHYSICAL THERAPIST

## 2021-10-06 PROCEDURE — 97140 MANUAL THERAPY 1/> REGIONS: CPT | Mod: GP | Performed by: PHYSICAL THERAPIST

## 2021-10-06 PROCEDURE — 97110 THERAPEUTIC EXERCISES: CPT | Mod: GP | Performed by: PHYSICAL THERAPIST

## 2021-10-07 ENCOUNTER — HOSPITAL ENCOUNTER (OUTPATIENT)
Dept: ULTRASOUND IMAGING | Facility: CLINIC | Age: 36
End: 2021-10-07
Attending: FAMILY MEDICINE
Payer: COMMERCIAL

## 2021-10-07 ENCOUNTER — HOSPITAL ENCOUNTER (OUTPATIENT)
Dept: MAMMOGRAPHY | Facility: CLINIC | Age: 36
End: 2021-10-07
Attending: FAMILY MEDICINE
Payer: COMMERCIAL

## 2021-10-07 ENCOUNTER — MYC MEDICAL ADVICE (OUTPATIENT)
Dept: FAMILY MEDICINE | Facility: CLINIC | Age: 36
End: 2021-10-07

## 2021-10-07 DIAGNOSIS — F41.1 GAD (GENERALIZED ANXIETY DISORDER): Primary | ICD-10-CM

## 2021-10-07 DIAGNOSIS — N63.23 BREAST LUMP ON LEFT SIDE AT 5 O'CLOCK POSITION: ICD-10-CM

## 2021-10-07 PROCEDURE — 272N000713 US BREAST BIOPSY CORE NEEDLE LEFT

## 2021-10-07 PROCEDURE — 999N000065 MA POST PROCEDURE LEFT

## 2021-10-07 PROCEDURE — 88305 TISSUE EXAM BY PATHOLOGIST: CPT | Mod: TC | Performed by: FAMILY MEDICINE

## 2021-10-07 RX ORDER — DIAZEPAM 5 MG
5 TABLET ORAL
Qty: 2 TABLET | Refills: 0 | Status: SHIPPED | OUTPATIENT
Start: 2021-10-07 | End: 2021-11-03

## 2021-10-08 ENCOUNTER — TELEPHONE (OUTPATIENT)
Dept: MAMMOGRAPHY | Facility: CLINIC | Age: 36
End: 2021-10-08

## 2021-10-08 LAB
PATH REPORT.COMMENTS IMP SPEC: NORMAL
PATH REPORT.FINAL DX SPEC: NORMAL
PATH REPORT.GROSS SPEC: NORMAL
PATH REPORT.MICROSCOPIC SPEC OTHER STN: NORMAL
PATH REPORT.RELEVANT HX SPEC: NORMAL
PHOTO IMAGE: NORMAL

## 2021-10-08 PROCEDURE — 88305 TISSUE EXAM BY PATHOLOGIST: CPT | Mod: 26 | Performed by: PATHOLOGY

## 2021-10-08 NOTE — TELEPHONE ENCOUNTER
Pathology report reviewed with our breast radiologist, Dr. Ware, who confirmed the recent breast imaging is concordant with the final pathology results below.    I phoned patient, confirmed her full name, date of birth, and informed patient of her biopsy results showing benign fibroadenoma.     Recommended follow up per Radiologistis is a 12 month ultrasound.  Patient should see PCP sooner than that if this lump grows or causes pain or concerns before that.      Patient reports no problems or concerns with her biopsy site.     Questions were answered and my phone number given if she has further questions or concerns.  I informed patient I will notify the ordering provider of the results and recommendations for follow up.  Patient verbalized understanding and agrees with the plan of care.     Prema Gill, RN, BSN  Breast Care Nurse Coordinator  [facility]  [phone]

## 2021-10-12 ENCOUNTER — VIRTUAL VISIT (OUTPATIENT)
Dept: NEUROLOGY | Facility: CLINIC | Age: 36
End: 2021-10-12
Payer: COMMERCIAL

## 2021-10-12 DIAGNOSIS — R41.3 MEMORY LOSS: Primary | ICD-10-CM

## 2021-10-12 PROCEDURE — 99213 OFFICE O/P EST LOW 20 MIN: CPT | Mod: GT | Performed by: PSYCHIATRY & NEUROLOGY

## 2021-10-12 RX ORDER — LORAZEPAM 1 MG/1
1 TABLET ORAL
Qty: 1 TABLET | Refills: 0 | Status: SHIPPED | OUTPATIENT
Start: 2021-10-12 | End: 2021-11-03

## 2021-10-12 NOTE — PROGRESS NOTES
Stephenie is a 35 year old who is being evaluated via a billable video visit.      How would you like to obtain your AVS? MyChart  If the video visit is dropped, the invitation should be resent by: Send to e-mail at: [email or web address]  Will anyone else be joining your video visit? No      Video Start Time: 0730  Video-Visit Details    Type of service:  Video Visit    Video End Time:7:47 AM    Originating Location (pt. Location): Home    Distant Location (provider location):  [facility] NEUROLOGY [facility]     Platform used for Video Visit: [employer]

## 2021-10-12 NOTE — PROGRESS NOTES
[facility] Neurology Follow Up Visit    Stephenie Menendez MRN# [id number]   Age: 35 year old YOB: 1985     Brief history of symptoms: The patient was initially seen in neurologic consultation on 7/15/2021 for evaluation of memory loss in the setting of COVID19 infection. Please see the comprehensive neurologic consultation notes from those dates in the Epic records for details.  Overall impression was for post-viral syndrome with symptoms of inflammatory arthropathy, dyspnea with exertion, transient periods of tachycardia, temperature dysregulation, and cognitive deficits in attention and slowed processing.  She was to attend OT COVID19 program, see pulmonology and rheumatology, trial melatonin at night for insomnia, and have serum studies done for alternative etiologies of possible small fiber neuropathy.    Interval history:   7/22/2021 serum studies were normal (A1c, TTG, CRP, ESR, Zinc, Copper, SPEP, Vitamin E, B6).  B1 was slightly low (62), CK was slightly elevated (210).    Today, the patient reports feeling relatively similar to her visit in 07/2021.  She is fully vaccinated.  Her COVID19 symptoms have been relatively unchanged but not fluctuating.  Her results of her breast biopsy were benign.  She attended SLP, PT and OT, which were somewhat helpful for cognitive issues as well as releasing her myofacial symptoms.  Her speech deficits are still present which impact her daily function.  Her daily fatigue is still present on most days.         Assessment and Plan:   Assessment:  Post viral syndrome with ongoing fatigue    The patient still has functional declines in word production and processing speed, along with severe fatigue.  Given her symptoms occurring after COVID19, is suspect this is mostly post-viral, but still cannot completely rule out a possible white matter demyelinating disease given her age, lack of brain imaging, and diffuse symptoms.  I feel that ruling out MS, or NMO is reasonable  by obtaining an MRI brain.  If negative, then continued management of fatigue from her post-viral condition is recommended to be done through continued OT/PT and PCP follow up.     Plan:  - MRI brain w/wout contrast    Follow up in Neurology clinic as needed should new concerns arise.    KIANA Ortega D.O.   of Neurology    Total time today (21 min) in this patient encounter was spent on pre-charting, counseling and/or coordination of care.

## 2021-10-12 NOTE — LETTER
10/12/2021       RE: Stephenie Menendez  [address]  [facility] [address]     Dear Colleague,    Thank you for referring your patient, Stephenie Menendez, to the [facility] [place] at [facility]. Please see a copy of my visit note below.    [facility] Neurology Follow Up Visit    Stephenie Menendez MRN# [id number]   Age: 35 year old YOB: 1985     Brief history of symptoms: The patient was initially seen in neurologic consultation on 7/15/2021 for evaluation of memory loss in the setting of COVID19 infection. Please see the comprehensive neurologic consultation notes from those dates in the Epic records for details.  Overall impression was for post-viral syndrome with symptoms of inflammatory arthropathy, dyspnea with exertion, transient periods of tachycardia, temperature dysregulation, and cognitive deficits in attention and slowed processing.  She was to attend OT COVID19 program, see pulmonology and rheumatology, trial melatonin at night for insomnia, and have serum studies done for alternative etiologies of possible small fiber neuropathy.    Interval history:   7/22/2021 serum studies were normal (A1c, TTG, CRP, ESR, Zinc, Copper, SPEP, Vitamin E, B6).  B1 was slightly low (62), CK was slightly elevated (210).    Today, the patient reports feeling relatively similar to her visit in 07/2021.  She is fully vaccinated.  Her COVID19 symptoms have been relatively unchanged but not fluctuating.  Her results of her breast biopsy were benign.  She attended SLP, PT and OT, which were somewhat helpful for cognitive issues as well as releasing her myofacial symptoms.  Her speech deficits are still present which impact her daily function.  Her daily fatigue is still present on most days.         Assessment and Plan:   Assessment:  Post viral syndrome with ongoing fatigue    The patient still has functional declines in word production and  processing speed, along with severe fatigue.  Given her symptoms occurring after COVID19, is suspect this is mostly post-viral, but still cannot completely rule out a possible white matter demyelinating disease given her age, lack of brain imaging, and diffuse symptoms.  I feel that ruling out MS, or NMO is reasonable by obtaining an MRI brain.  If negative, then continued management of fatigue from her post-viral condition is recommended to be done through continued OT/PT and PCP follow up.     Plan:  - MRI brain w/wout contrast    Follow up in Neurology clinic as needed should new concerns arise.    KIANA Ortega D.O.   of Neurology    Total time today (21 min) in this patient encounter was spent on pre-charting, counseling and/or coordination of care.       Again, thank you for allowing me to participate in the care of your patient.      Sincerely,    Bhupinder Ortega, DO

## 2021-10-14 ENCOUNTER — HOSPITAL ENCOUNTER (OUTPATIENT)
Dept: SPEECH THERAPY | Facility: REHABILITATION | Age: 36
End: 2021-10-14
Payer: COMMERCIAL

## 2021-10-14 ENCOUNTER — HOSPITAL ENCOUNTER (OUTPATIENT)
Dept: PHYSICAL THERAPY | Facility: REHABILITATION | Age: 36
End: 2021-10-14
Payer: COMMERCIAL

## 2021-10-14 DIAGNOSIS — R41.841 COGNITIVE COMMUNICATION DEFICIT: Primary | ICD-10-CM

## 2021-10-14 DIAGNOSIS — M62.81 GENERALIZED MUSCLE WEAKNESS: ICD-10-CM

## 2021-10-14 DIAGNOSIS — Z86.16 PERSONAL HISTORY OF COVID-19: ICD-10-CM

## 2021-10-14 DIAGNOSIS — R68.89 EXERCISE INTOLERANCE: Primary | ICD-10-CM

## 2021-10-14 DIAGNOSIS — M79.18 MYOFASCIAL PAIN: ICD-10-CM

## 2021-10-14 PROCEDURE — 97110 THERAPEUTIC EXERCISES: CPT | Mod: GP | Performed by: PHYSICAL THERAPIST

## 2021-10-14 PROCEDURE — 92507 TX SP LANG VOICE COMM INDIV: CPT | Mod: GN | Performed by: SPEECH-LANGUAGE PATHOLOGIST

## 2021-10-14 PROCEDURE — 97112 NEUROMUSCULAR REEDUCATION: CPT | Mod: GP | Performed by: PHYSICAL THERAPIST

## 2021-10-14 PROCEDURE — 97140 MANUAL THERAPY 1/> REGIONS: CPT | Mod: GP | Performed by: PHYSICAL THERAPIST

## 2021-10-20 ENCOUNTER — HOSPITAL ENCOUNTER (OUTPATIENT)
Dept: PHYSICAL THERAPY | Facility: REHABILITATION | Age: 36
End: 2021-10-20
Payer: COMMERCIAL

## 2021-10-20 DIAGNOSIS — M62.81 GENERALIZED MUSCLE WEAKNESS: ICD-10-CM

## 2021-10-20 DIAGNOSIS — M79.18 MYOFASCIAL PAIN: ICD-10-CM

## 2021-10-20 DIAGNOSIS — R68.89 EXERCISE INTOLERANCE: Primary | ICD-10-CM

## 2021-10-20 DIAGNOSIS — Z86.16 PERSONAL HISTORY OF COVID-19: ICD-10-CM

## 2021-10-20 PROCEDURE — 97140 MANUAL THERAPY 1/> REGIONS: CPT | Mod: GP | Performed by: PHYSICAL THERAPIST

## 2021-10-20 PROCEDURE — 97112 NEUROMUSCULAR REEDUCATION: CPT | Mod: GP | Performed by: PHYSICAL THERAPIST

## 2021-10-20 PROCEDURE — 97110 THERAPEUTIC EXERCISES: CPT | Mod: GP | Performed by: PHYSICAL THERAPIST

## 2021-10-22 ENCOUNTER — HOSPITAL ENCOUNTER (OUTPATIENT)
Dept: MRI IMAGING | Facility: CLINIC | Age: 36
Discharge: HOME | End: 2021-10-22
Attending: PSYCHIATRY & NEUROLOGY | Admitting: PSYCHIATRY & NEUROLOGY
Payer: COMMERCIAL

## 2021-10-22 DIAGNOSIS — R41.3 MEMORY LOSS: ICD-10-CM

## 2021-10-22 PROCEDURE — 255N000002 HC RX 255 OP 636: Performed by: PSYCHIATRY & NEUROLOGY

## 2021-10-22 PROCEDURE — 70553 MRI BRAIN STEM W/O & W/DYE: CPT

## 2021-10-22 PROCEDURE — A9585 GADOBUTROL INJECTION: HCPCS | Performed by: PSYCHIATRY & NEUROLOGY

## 2021-10-22 RX ORDER — GADOBUTROL 604.72 MG/ML
0.1 INJECTION INTRAVENOUS ONCE
Status: COMPLETED | OUTPATIENT
Start: 2021-10-22 | End: 2021-10-22

## 2021-10-22 RX ADMIN — GADOBUTROL 7 ML: 604.72 INJECTION INTRAVENOUS at 20:18

## 2021-10-28 ENCOUNTER — HOSPITAL ENCOUNTER (OUTPATIENT)
Dept: SPEECH THERAPY | Facility: REHABILITATION | Age: 36
End: 2021-10-28
Payer: COMMERCIAL

## 2021-10-28 DIAGNOSIS — R41.841 COGNITIVE COMMUNICATION DEFICIT: Primary | ICD-10-CM

## 2021-10-28 PROCEDURE — 92507 TX SP LANG VOICE COMM INDIV: CPT | Mod: GN | Performed by: SPEECH-LANGUAGE PATHOLOGIST

## 2021-10-29 ENCOUNTER — HOSPITAL ENCOUNTER (OUTPATIENT)
Dept: PHYSICAL THERAPY | Facility: REHABILITATION | Age: 36
End: 2021-10-29
Payer: COMMERCIAL

## 2021-10-29 DIAGNOSIS — Z86.16 PERSONAL HISTORY OF COVID-19: ICD-10-CM

## 2021-10-29 DIAGNOSIS — M79.18 MYOFASCIAL PAIN: ICD-10-CM

## 2021-10-29 DIAGNOSIS — M62.81 GENERALIZED MUSCLE WEAKNESS: ICD-10-CM

## 2021-10-29 DIAGNOSIS — R68.89 EXERCISE INTOLERANCE: Primary | ICD-10-CM

## 2021-10-29 PROCEDURE — 97110 THERAPEUTIC EXERCISES: CPT | Mod: GP | Performed by: PHYSICAL THERAPIST

## 2021-10-29 PROCEDURE — 97112 NEUROMUSCULAR REEDUCATION: CPT | Mod: GP | Performed by: PHYSICAL THERAPIST

## 2021-10-29 PROCEDURE — 97140 MANUAL THERAPY 1/> REGIONS: CPT | Mod: GP | Performed by: PHYSICAL THERAPIST

## 2021-11-02 ENCOUNTER — HOSPITAL ENCOUNTER (OUTPATIENT)
Dept: PHYSICAL THERAPY | Facility: REHABILITATION | Age: 36
End: 2021-11-02
Payer: COMMERCIAL

## 2021-11-02 DIAGNOSIS — R68.89 EXERCISE INTOLERANCE: Primary | ICD-10-CM

## 2021-11-02 DIAGNOSIS — M62.81 GENERALIZED MUSCLE WEAKNESS: ICD-10-CM

## 2021-11-02 DIAGNOSIS — M79.18 MYOFASCIAL PAIN: ICD-10-CM

## 2021-11-02 DIAGNOSIS — Z86.16 PERSONAL HISTORY OF COVID-19: ICD-10-CM

## 2021-11-02 PROCEDURE — 97110 THERAPEUTIC EXERCISES: CPT | Mod: GP | Performed by: PHYSICAL THERAPIST

## 2021-11-02 PROCEDURE — 97112 NEUROMUSCULAR REEDUCATION: CPT | Mod: GP | Performed by: PHYSICAL THERAPIST

## 2021-11-02 PROCEDURE — 97140 MANUAL THERAPY 1/> REGIONS: CPT | Mod: GP | Performed by: PHYSICAL THERAPIST

## 2021-11-03 ENCOUNTER — OFFICE VISIT (OUTPATIENT)
Dept: FAMILY MEDICINE | Facility: CLINIC | Age: 36
End: 2021-11-03
Payer: COMMERCIAL

## 2021-11-03 VITALS
WEIGHT: 165.1 LBS | BODY MASS INDEX: 25.91 KG/M2 | HEART RATE: 95 BPM | HEIGHT: 67 IN | SYSTOLIC BLOOD PRESSURE: 122 MMHG | OXYGEN SATURATION: 100 % | DIASTOLIC BLOOD PRESSURE: 78 MMHG

## 2021-11-03 DIAGNOSIS — N63.11 BREAST LUMP ON RIGHT SIDE AT 10 O'CLOCK POSITION: ICD-10-CM

## 2021-11-03 DIAGNOSIS — D24.2 FIBROADENOMA OF BREAST, LEFT: ICD-10-CM

## 2021-11-03 PROCEDURE — 99213 OFFICE O/P EST LOW 20 MIN: CPT | Performed by: FAMILY MEDICINE

## 2021-11-03 ASSESSMENT — ASTHMA QUESTIONNAIRES
QUESTION_3 LAST FOUR WEEKS HOW OFTEN DID YOUR ASTHMA SYMPTOMS (WHEEZING, COUGHING, SHORTNESS OF BREATH, CHEST TIGHTNESS OR PAIN) WAKE YOU UP AT NIGHT OR EARLIER THAN USUAL IN THE MORNING: NOT AT ALL
QUESTION_4 LAST FOUR WEEKS HOW OFTEN HAVE YOU USED YOUR RESCUE INHALER OR NEBULIZER MEDICATION (SUCH AS ALBUTEROL): TWO OR THREE TIMES PER WEEK
QUESTION_2 LAST FOUR WEEKS HOW OFTEN HAVE YOU HAD SHORTNESS OF BREATH: THREE TO SIX TIMES A WEEK
ACT_TOTALSCORE: 18
QUESTION_1 LAST FOUR WEEKS HOW MUCH OF THE TIME DID YOUR ASTHMA KEEP YOU FROM GETTING AS MUCH DONE AT WORK, SCHOOL OR AT HOME: A LITTLE OF THE TIME
QUESTION_5 LAST FOUR WEEKS HOW WOULD YOU RATE YOUR ASTHMA CONTROL: SOMEWHAT CONTROLLED

## 2021-11-03 ASSESSMENT — MIFFLIN-ST. JEOR: SCORE: 1471.52

## 2021-11-03 NOTE — ASSESSMENT & PLAN NOTE
New lump today in right breast at the 10 o'clock position will ultrasound today.    Patient recently had a lump on the left breast which was biopsied and confirmed to be a fibroadenoma. We discussed today that we can get an ultrasound to evaluate the right breast lump but if that is also a fibroadenoma we may also consider monitoring these lumps. We can image anything that is enlarging, fixed, or unusual or worrisome in any way. I also discussed with her decreasing caffeine/sugar in the diet can be helpful. She says she does not eat much sugar but she could decrease her coffee intake.    If she continues to have concerns about breast lumps we can also consult general surgery to have them do an exam and give their opinion as she is very anxious about her breast lumps.

## 2021-11-03 NOTE — ASSESSMENT & PLAN NOTE
9/2021 mammo shows left lump, bx confirmed fibroadnenoma.     New lump today in right breast at the 10 o'clock position will ultrasound today.

## 2021-11-03 NOTE — PROGRESS NOTES
"5/28/2021 cbc ess nl. nl tsh, low ferritin cbc/ iron studies cw diet deficiency (need increase iron intake), ldl slightly worse. tchol up from 229 - 234, tg up from 69 - 94, hdl down from 79 - 66, ldl up from 136-149. nl cmp. vit d 22.2 ( down from 55 pr  evious time) -negative spike antibody to Covid  6/11/2021 normal cardiac echo  9/2021 mammo shows left lump, bx confirmed fibroadnenoma.   10/15/21 neuro referral      Assessment & Plan   Problem List Items Addressed This Visit        Other    Breast lump on right side at 10 o'clock position     New lump today in right breast at the 10 o'clock position will ultrasound today.    Patient recently had a lump on the left breast which was biopsied and confirmed to be a fibroadenoma. We discussed today that we can get an ultrasound to evaluate the right breast lump but if that is also a fibroadenoma we may also consider monitoring these lumps. We can image anything that is enlarging, fixed, or unusual or worrisome in any way. I also discussed with her decreasing caffeine/sugar in the diet can be helpful. She says she does not eat much sugar but she could decrease her coffee intake.    If she continues to have concerns about breast lumps we can also consult general surgery to have them do an exam and give their opinion as she is very anxious about her breast lumps.         Relevant Orders    US Breast Right Limited 1-3 Quadrants    RESOLVED: Fibroadenoma of breast, left     9/2021 mammo shows left lump, bx confirmed fibroadnenoma.     New lump today in right breast at the 10 o'clock position will ultrasound today.               [id number]}     BMI:   Estimated body mass index is 25.86 kg/m  as calculated from the following:    Height as of this encounter: 1.702 m (5' 7\").    Weight as of this encounter: 74.9 kg (165 lb 1.6 oz).   Weight management plan: Discussed healthy diet and exercise guidelines    Patient Instructions   Return in about 4 weeks (around 12/1/2021) for " "Routine preventive.       No follow-ups on file.    Susy Matthews MD  [facility]    Chief Complaint   Patient presents with     RECHECK     F/u from Biopsy     Breast Pain     Found new lump on rt side of breast x 2 or 3 weeks ago        Subjective   Stephenie is a 35 year old who presents for the following health issues     Continued pain in the left breast after biospy which was done over a month ago.   She also found another lump on the contralateral breast.         Objective    /78 (BP Location: Left arm, Patient Position: Sitting, Cuff Size: Adult Regular)   Pulse 95   Ht 1.702 m (5' 7\")   Wt 74.9 kg (165 lb 1.6 oz)   SpO2 100%   BMI 25.86 kg/m    Body mass index is 25.86 kg/m .  Physical Exam  Constitutional:       Appearance: Normal appearance.   HENT:      Head: Normocephalic and atraumatic.      Right Ear: Tympanic membrane, ear canal and external ear normal.      Left Ear: Tympanic membrane, ear canal and external ear normal.      Nose: Nose normal.      Mouth/Throat:      Mouth: Mucous membranes are moist.      Pharynx: Oropharynx is clear.   Eyes:      Extraocular Movements: Extraocular movements intact.      Conjunctiva/sclera: Conjunctivae normal.      Pupils: Pupils are equal, round, and reactive to light.   Cardiovascular:      Rate and Rhythm: Normal rate and regular rhythm.      Heart sounds: Normal heart sounds.   Pulmonary:      Effort: Pulmonary effort is normal.      Breath sounds: Normal breath sounds.   Chest:      Breasts: Breasts are symmetrical.         Right: Mass (There is an 8 mm spherical round mobile rubbery mass in the right breast at the 10 o'clock position.) present. No swelling, bleeding, inverted nipple, nipple discharge, skin change or tenderness.         Left: Normal. No swelling, bleeding, inverted nipple, mass, nipple discharge, skin change or tenderness.   Abdominal:      General: Bowel sounds are normal.      Palpations: Abdomen is " soft.   Musculoskeletal:         General: Normal range of motion.      Cervical back: Normal range of motion and neck supple.   Skin:     General: Skin is warm and dry.      Capillary Refill: Capillary refill takes less than 2 seconds.   Neurological:      General: No focal deficit present.      Mental Status: She is alert and oriented to person, place, and time.   Psychiatric:         Mood and Affect: Mood normal.         Behavior: Behavior normal.         Thought Content: Thought content normal.         Judgment: Judgment normal.

## 2021-11-04 ASSESSMENT — ASTHMA QUESTIONNAIRES: ACT_TOTALSCORE: 18

## 2021-11-04 NOTE — ADDENDUM NOTE
Encounter addended by: Myra Sanchez, OT on: 11/3/2021 7:53 PM   Actions taken: Episode resolved, Clinical Note Signed

## 2021-11-04 NOTE — PROGRESS NOTES
Outpatient Occupational Therapy Discharge Note     Patient: Stephenie Menendez  : 1985    Beginning/End Dates of Reporting Period:  21 to 21    Referring Provider: Dr. Bhupinder Ortega    Therapy Diagnosis: memory loss, UE weakness    Goals: Goals partially met    Plan:  Discharge from therapy.

## 2021-11-09 ENCOUNTER — HOSPITAL ENCOUNTER (OUTPATIENT)
Dept: PHYSICAL THERAPY | Facility: REHABILITATION | Age: 36
End: 2021-11-09
Payer: COMMERCIAL

## 2021-11-09 ENCOUNTER — HOSPITAL ENCOUNTER (OUTPATIENT)
Dept: SPEECH THERAPY | Facility: REHABILITATION | Age: 36
End: 2021-11-09
Payer: COMMERCIAL

## 2021-11-09 DIAGNOSIS — R68.89 EXERCISE INTOLERANCE: Primary | ICD-10-CM

## 2021-11-09 DIAGNOSIS — R41.841 COGNITIVE COMMUNICATION DEFICIT: Primary | ICD-10-CM

## 2021-11-09 DIAGNOSIS — M62.81 GENERALIZED MUSCLE WEAKNESS: ICD-10-CM

## 2021-11-09 DIAGNOSIS — M79.18 MYOFASCIAL PAIN: ICD-10-CM

## 2021-11-09 DIAGNOSIS — Z86.16 PERSONAL HISTORY OF COVID-19: ICD-10-CM

## 2021-11-09 PROCEDURE — 92507 TX SP LANG VOICE COMM INDIV: CPT | Mod: GN | Performed by: SPEECH-LANGUAGE PATHOLOGIST

## 2021-11-09 PROCEDURE — 97140 MANUAL THERAPY 1/> REGIONS: CPT | Mod: GP | Performed by: PHYSICAL THERAPIST

## 2021-11-09 PROCEDURE — 97112 NEUROMUSCULAR REEDUCATION: CPT | Mod: GP | Performed by: PHYSICAL THERAPIST

## 2021-11-09 PROCEDURE — 97110 THERAPEUTIC EXERCISES: CPT | Mod: GP | Performed by: PHYSICAL THERAPIST

## 2021-11-10 ENCOUNTER — OFFICE VISIT (OUTPATIENT)
Dept: RHEUMATOLOGY | Facility: CLINIC | Age: 36
End: 2021-11-10
Attending: PSYCHIATRY & NEUROLOGY
Payer: COMMERCIAL

## 2021-11-10 ENCOUNTER — ANCILLARY PROCEDURE (OUTPATIENT)
Dept: GENERAL RADIOLOGY | Facility: CLINIC | Age: 36
End: 2021-11-10
Attending: INTERNAL MEDICINE
Payer: COMMERCIAL

## 2021-11-10 VITALS
SYSTOLIC BLOOD PRESSURE: 121 MMHG | HEIGHT: 67 IN | DIASTOLIC BLOOD PRESSURE: 78 MMHG | HEART RATE: 92 BPM | BODY MASS INDEX: 26.02 KG/M2 | WEIGHT: 165.8 LBS

## 2021-11-10 DIAGNOSIS — M25.50 CHRONIC PAIN OF MULTIPLE JOINTS: ICD-10-CM

## 2021-11-10 DIAGNOSIS — M70.62 TROCHANTERIC BURSITIS OF BOTH HIPS: ICD-10-CM

## 2021-11-10 DIAGNOSIS — G89.29 CHRONIC PAIN OF MULTIPLE JOINTS: ICD-10-CM

## 2021-11-10 DIAGNOSIS — G89.29 CHRONIC PAIN OF BOTH KNEES: ICD-10-CM

## 2021-11-10 DIAGNOSIS — M54.2 CHRONIC NECK PAIN: ICD-10-CM

## 2021-11-10 DIAGNOSIS — M25.561 CHRONIC PAIN OF BOTH KNEES: ICD-10-CM

## 2021-11-10 DIAGNOSIS — M54.9 CHRONIC UPPER BACK PAIN: ICD-10-CM

## 2021-11-10 DIAGNOSIS — M79.7 FIBROMYALGIA: ICD-10-CM

## 2021-11-10 DIAGNOSIS — G89.29 CHRONIC NECK PAIN: ICD-10-CM

## 2021-11-10 DIAGNOSIS — G89.29 CHRONIC UPPER BACK PAIN: ICD-10-CM

## 2021-11-10 DIAGNOSIS — M25.562 CHRONIC PAIN OF BOTH KNEES: ICD-10-CM

## 2021-11-10 DIAGNOSIS — G89.29 CHRONIC PAIN OF MULTIPLE JOINTS: Primary | ICD-10-CM

## 2021-11-10 DIAGNOSIS — E55.9 VITAMIN D DEFICIENCY: ICD-10-CM

## 2021-11-10 DIAGNOSIS — M70.61 TROCHANTERIC BURSITIS OF BOTH HIPS: ICD-10-CM

## 2021-11-10 DIAGNOSIS — M25.50 CHRONIC PAIN OF MULTIPLE JOINTS: Primary | ICD-10-CM

## 2021-11-10 PROCEDURE — 99205 OFFICE O/P NEW HI 60 MIN: CPT | Performed by: INTERNAL MEDICINE

## 2021-11-10 PROCEDURE — 73562 X-RAY EXAM OF KNEE 3: CPT | Mod: TC | Performed by: RADIOLOGY

## 2021-11-10 PROCEDURE — 73130 X-RAY EXAM OF HAND: CPT | Mod: TC | Performed by: RADIOLOGY

## 2021-11-10 RX ORDER — CYCLOBENZAPRINE HCL 10 MG
TABLET ORAL
Qty: 30 TABLET | Refills: 2 | Status: SHIPPED | OUTPATIENT
Start: 2021-11-10 | End: 2022-02-09

## 2021-11-10 RX ORDER — MELOXICAM 7.5 MG/1
TABLET ORAL
Qty: 60 TABLET | Refills: 2 | Status: SHIPPED | OUTPATIENT
Start: 2021-11-10 | End: 2022-02-09

## 2021-11-10 ASSESSMENT — MIFFLIN-ST. JEOR: SCORE: 1474.69

## 2021-11-10 NOTE — PATIENT INSTRUCTIONS
Summary of Your Rheumatology Visit    Next Appointment:  3 Months    Medications:    Please follow directives on pill bottle on how to take medication(s) provided.    When starting 2 or more new medications, recommend spacing out the new medications by at least 3 days, this way if you have an allergic reaction there is a greater chance of associating the cause for reaction.     Hold ibuprofen while on meloxicam.    Referrals:    Physical Therapy      Tests:     Please have labs and x-rays that were ordered performed.          Injections:        Other:

## 2021-11-10 NOTE — PROGRESS NOTES
Stephenie Menendez who presents today with a chief complaint of  Consult (joint pain and fatigue )      Joint Pains: Yes  Location: multiple joint pain, mainly both hands & knees   Onset: Chronic 1 years   Intensity: 4-6 /10  AM Stiffness:yes, 1 hour    Alleviating/Aggravating Factors: yes Medications helpful  Tolerating Meds: Yes tolerate medication.   Other:      ROS:  Patient denies having: persistent dry eyes, dry mouth, recurrent oral ulcers, patchy alopecia, active rashes, photosensitivity, history of psoriasis, active chest pain,+ active shortness of breath 03/2020, active cough, active dysuria, history of kidney stones, active abdominal pain, active diarrhea, history of hematochezia, active dysphagia, history of peptic ulcer disease, history of HIV, tuberculosis, hepatitis B or C, Lyme disease, seizure history, raynaud's, active documented fevers, recent infections, +difficulty sleeping or chronic unrefreshing sleep 3/2020, involuntary weight loss, loss of appetite,+ excessive fatigue 03/2020,+ depression, anxiety,  recurrent sinus infections, history of inflammatory eye diseases (such as uveitis, scleritis, iritis, etc).       Information gathered by medical assistant incorporated into this note, was reviewed and discussed with the patient.    Problem List:  Patient Active Problem List   Diagnosis     Anemia     Vitamin D deficiency     Temporomandibular joint disorder     Neck pain     Preventative health care     ASCUS of cervix with negative high risk HPV     Mixed hyperlipidemia     Chronic pain of left knee     Personal history of COVID-19     Exercise intolerance     BMI 26.0-26.9,adult/ overweight     Recurrent major depressive episodes (H)     Acute antritis     Adjustment disorder with mixed anxiety and depressed mood     Fatigue     VALARIE (generalized anxiety disorder)     Major depressive disorder, recurrent, in partial remission (H)     Palpitations     Breast lump on right side at 10 o'clock  position        PMH:   Past Medical History:   Diagnosis Date     Anemia      Arthritis      Breast asymmetry in female 2018     Depression      Depressive disorder      Fibroadenoma of breast, left 9/15/2021     Leukopenia 2018     Major depressive disorder, recurrent episode (H)     celexa 10 mg po q day   Replacement Utility updated for latest IMO load       Surgical History:  Past Surgical History:   Procedure Laterality Date     C  DELIVERY ONLY      Description:  Section;  Recorded: 2014;     TUBAL LIGATION         Family History:  Family History   Problem Relation Age of Onset     Hypertension Mother      Hyperlipidemia Mother      Hyperthyroidism Mother         benign mass removed.     Hypertension Father      Prostate Cancer Father      Hyperlipidemia Father      Anxiety Disorder Father      Depression Brother      Anxiety Disorder Brother      Autism Spectrum Disorder Daughter      Attention Deficit Disorder Son      Autism Spectrum Disorder Son      Diabetes Maternal Grandmother      Depression Maternal Grandmother      Heart Disease Maternal Grandmother      Hypertension Maternal Grandmother      Colon Cancer Maternal Grandmother      Lung Cancer Maternal Grandmother      Goiter Maternal Grandmother      Hypertension Maternal Grandfather      Hypertension Paternal Grandmother      Cancer Paternal Grandmother         stomach     Hypertension Paternal Grandfather      Breast Cancer No family hx of        Social History:   reports that she has never smoked. She has never used smokeless tobacco. She reports current alcohol use. She reports that she does not use drugs.    Allergies:  Allergies   Allergen Reactions     Cymbalta Difficulty breathing and Rash     Duloxetine Rash and Shortness Of Breath     THROAT CONSTRICTION     Imitrex [Sumatriptan]      Respiratory distress.        Current Medications:  Current Outpatient Medications   Medication Sig Dispense Refill      "Acetaminophen (TYLENOL PO) Take  by mouth as needed.       cyclobenzaprine (FLEXERIL) 5 MG tablet Take 10 mg by mouth every 8 hours as needed       ibuprofen (ADVIL,MOTRIN) 400-800 mg tablet Take 400-800 mg by mouth every 6 hours as needed.       levalbuterol (XOPENEX HFA) 45 MCG/ACT inhaler Inhale 2 puffs into the lungs every 6 hours as needed for shortness of breath / dyspnea or wheezing 15 g 3           Physical Exam:  /78 (BP Location: Right arm, Patient Position: Sitting, Cuff Size: Adult Regular)   Pulse 92   Ht 1.702 m (5' 7\")   Wt 75.2 kg (165 lb 12.8 oz)   BMI 25.97 kg/m    General: A & O x 3 in NAD  HEENT: EOMI, Non injected/non icteric sclera, no oral lesions noted  Neck: Supple, no cervical LAD or thyromegaly noted  Derm: No malar rash, psoriatic lesions or nail pitting appreciated  CV: s1s2 with RRR, no rubs appreciated   Lungs: CTA B/L, no wheezing , rales or rhonci appreciated  GI: Soft, NT/ND, no rebound, no guarding noted, no hepatomegally appreciated  MS: Mild tenderness involving left fifth DIP, bilateral third through fifth PIP joints on palpation with questionable subtle fullness.  Passive flexion/extension of knees reproduce a mild discomfort, left greater than right.  Passive shifting of patellas reduce of discomfort, left greater than right.  No warmth, erythema or clear signs of synovitis noted involving knee joints.  Positive discomfort involving trochanteric bursa regions on straight leg raising is with focal palpation.  Greater than 11/18 myofascial tender points..  Otherwise patient demonstrated good passive/active ROM over other joints with no warmth, erythema, tenderness or synovitis noted over these joints.  Neuro: 5/5 strength in upper and lower extremities b/l, good sensation b/l,  1+ bicep and patella reflexes b/l      Summary/Assessment:    Pleasant 35-year-old female presents with chronic multiple joint pains and myalgias.    Patient states that she came down with Covid " in March 2020 (unknown source) and  continue to be ill for about 4 months thereafter.  Was not hospitalized for Covid symptoms.  Since then she has had periodic episodes of not feeling herself with increased arthralgias/myalgias, body aches, headaches, fatigue and periodic fevers ranging from .    Joints involved include: Hands, shoulders, knees, neck and sometimes feet)    Since onset of Covid has developed asthma.    States has periodic flares described above about once every 1 to 2 weeks, lasting a few days to few weeks, self-limiting, unknown trigger.    Patient saw Dr. Millan back in 2017-18 for polyarthralgias, found to have inflammatory arthropathy component.  States then although had less arthralgias/myalgias, had more joint swelling involving her hands.  Brief course of prednisone provided some relief however not complete relief at the time.  States methotrexate discussed however never started.    Patient adds that when she saw Dr. 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    Patient currently established with neurology, referred patient to us.  Has been experiencing some memory loss.    Has been taking Tylenol 1300 mg 2-3 times a day and ibuprofen 800 mg 3 times daily which provide some relief.    Admits to having chronic nonrestorative sleep.    States her depression has been stable currently not seeing a counselor or take medication.    Denies having increased stressors lately.    Denies family history for connective tissue disease.    Denies personal or family history for psoriasis.    Lately has not been very compliant with taking vitamin D, cut back on the summertime, has history of deficiency.    Given the above, difficult to tell at this time with certainty as to what the primary source is contributing to symptoms described.  Appears to have component of fibromyalgia contributing given chronic  nonrestorative sleep, chronic fatigue, diffuse nature pains and greater than 11/18 myofascial tender points.  However this is a diagnosis of exclusion sometimes may just be a secondary/superimposed condition.  Inflammatory arthritis also in the differential given questionable subtle fullness noted involving third/fifth PIPs.  Appears to have component of trochanteric bursitis affecting her lateral hips.  May have patellofemoral syndrome contributing to left greater than right knee pains.    Please see below for management plan.        Pertinent rheumatology/past medical history (please refer to above for more detailed history):      Chronic multiple joint pains (hands, knees, shoulders, sometimes feet)    Fibromyalgia    Chronic nonrestorative sleep/chronic fatigue    Periodic fevers    Chronic neck pain    Chronic upper back pain    Trochanteric bursitis, bilateral    Possible patellofemoral syndrome, left greater than right    Vitamin D deficiency    Asthma (post Covid)    History of depression (stable)      Rheumatology medications provided/suggested:    Flexeril  Tylenol  Meloxicam      Pertinent medication from other providers or from otc (please refer to above for more detailed med list):    Xopenex inhaler      Pertinent medications already tried:     Ibuprofen  Plaquenil (ineffective and se per Dr. Millan note 5/18))    Pertinent lab history:    Negative/unremarkable: ESR/CRP      Pertinent imaging/test history:    12/20, MRI left knee  1.  Intact left knee menisci, cruciate and collateral ligaments.  2.  Partial thickness cartilage fissuring and cartilage surface irregularity apex median ridge.  3.  Mild edema/inflammation in the suprapatellar fat pad.  4.  Tubular ganglion cyst in the anterior intercondylar notch extending into the deep aspect of Hoffa's fat pad.  5.  No acute knee fracture or stress fracture.    Nl CXR    Other:    , 3 children.     Not working right now. Previous work is office  work administration and as an .     Drink 3 glass of red wine 3 times per week. No smoking. No drug use.     Tubal ligation since 2008. Patient still having menstrual periods.    Plan:      We will add Flexeril to act as a muscle relaxant hopefully improve her sleep.  We will provide meloxicam 7.5 mg twice daily as needed for breakthrough pain.    Hold ibuprofen when on meloxicam.    Will refer to physical therapy to work on: Bilateral knees, trochanteric bursa regions bilaterally upper back and neck.    Will obtain x-rays of knees with sunrise views and hands.    Suggested she pursue labs when flaring.    Follow-up in 3 months, in clinic      Procedure note:     Total time spent 65 minutes involved with patient care, includes placing orders, reviewing records and formulating management plan.      Major side effect profile of medications provided/suggested were discussed with the patient.    This note was transcribed using Dragon voice recognition software as a result unintentional grammatical errors or word substitutions may have occurred. Please contact our Rheumatology department if you need any clarification or if you have any related inquiries.    Thank you for referring this patient to our clinic.      Chevy Carlton DO     ....................  11/10/2021   11:17 AM

## 2021-11-16 ENCOUNTER — ANCILLARY PROCEDURE (OUTPATIENT)
Dept: MAMMOGRAPHY | Facility: CLINIC | Age: 36
End: 2021-11-16
Attending: FAMILY MEDICINE
Payer: COMMERCIAL

## 2021-11-16 ENCOUNTER — HOSPITAL ENCOUNTER (OUTPATIENT)
Dept: PHYSICAL THERAPY | Facility: REHABILITATION | Age: 36
End: 2021-11-16
Payer: COMMERCIAL

## 2021-11-16 ENCOUNTER — LAB (OUTPATIENT)
Dept: LAB | Facility: CLINIC | Age: 36
End: 2021-11-16

## 2021-11-16 DIAGNOSIS — Z86.16 PERSONAL HISTORY OF COVID-19: ICD-10-CM

## 2021-11-16 DIAGNOSIS — M25.50 CHRONIC PAIN OF MULTIPLE JOINTS: ICD-10-CM

## 2021-11-16 DIAGNOSIS — R68.89 EXERCISE INTOLERANCE: Primary | ICD-10-CM

## 2021-11-16 DIAGNOSIS — E55.9 VITAMIN D DEFICIENCY: ICD-10-CM

## 2021-11-16 DIAGNOSIS — N63.11 BREAST LUMP ON RIGHT SIDE AT 10 O'CLOCK POSITION: ICD-10-CM

## 2021-11-16 DIAGNOSIS — M79.18 MYOFASCIAL PAIN: ICD-10-CM

## 2021-11-16 DIAGNOSIS — M62.81 GENERALIZED MUSCLE WEAKNESS: ICD-10-CM

## 2021-11-16 DIAGNOSIS — M79.7 FIBROMYALGIA: ICD-10-CM

## 2021-11-16 DIAGNOSIS — G89.29 CHRONIC PAIN OF MULTIPLE JOINTS: ICD-10-CM

## 2021-11-16 LAB
ALT SERPL W P-5'-P-CCNC: 15 U/L (ref 0–45)
AST SERPL W P-5'-P-CCNC: 17 U/L (ref 0–40)
B BURGDOR IGG+IGM SER QL: 0.2
BASOPHILS # BLD AUTO: 0 10E3/UL (ref 0–0.2)
BASOPHILS NFR BLD AUTO: 1 %
C REACTIVE PROTEIN LHE: 0.1 MG/DL (ref 0–0.8)
CALCIUM SERPL-MCNC: 9.9 MG/DL (ref 8.5–10.5)
CREAT SERPL-MCNC: 0.85 MG/DL (ref 0.6–1.1)
EOSINOPHIL # BLD AUTO: 0.1 10E3/UL (ref 0–0.7)
EOSINOPHIL NFR BLD AUTO: 3 %
ERYTHROCYTE [DISTWIDTH] IN BLOOD BY AUTOMATED COUNT: 14.9 % (ref 10–15)
ERYTHROCYTE [SEDIMENTATION RATE] IN BLOOD BY WESTERGREN METHOD: 7 MM/HR (ref 0–20)
FERRITIN SERPL-MCNC: 3 NG/ML (ref 10–130)
GFR SERPL CREATININE-BSD FRML MDRD: 88 ML/MIN/1.73M2
HCT VFR BLD AUTO: 35.2 % (ref 35–47)
HGB BLD-MCNC: 11 G/DL (ref 11.7–15.7)
IMM GRANULOCYTES # BLD: 0 10E3/UL
IMM GRANULOCYTES NFR BLD: 0 %
LYMPHOCYTES # BLD AUTO: 1.5 10E3/UL (ref 0.8–5.3)
LYMPHOCYTES NFR BLD AUTO: 36 %
MCH RBC QN AUTO: 24.6 PG (ref 26.5–33)
MCHC RBC AUTO-ENTMCNC: 31.3 G/DL (ref 31.5–36.5)
MCV RBC AUTO: 79 FL (ref 78–100)
MONOCYTES # BLD AUTO: 0.3 10E3/UL (ref 0–1.3)
MONOCYTES NFR BLD AUTO: 8 %
NEUTROPHILS # BLD AUTO: 2.2 10E3/UL (ref 1.6–8.3)
NEUTROPHILS NFR BLD AUTO: 53 %
PLATELET # BLD AUTO: 298 10E3/UL (ref 150–450)
RBC # BLD AUTO: 4.48 10E6/UL (ref 3.8–5.2)
RHEUMATOID FACT SER NEPH-ACNC: <15 IU/ML (ref 0–30)
URATE SERPL-MCNC: 4.7 MG/DL (ref 2–7.5)
WBC # BLD AUTO: 4.2 10E3/UL (ref 4–11)

## 2021-11-16 PROCEDURE — 86431 RHEUMATOID FACTOR QUANT: CPT

## 2021-11-16 PROCEDURE — 84550 ASSAY OF BLOOD/URIC ACID: CPT

## 2021-11-16 PROCEDURE — 86481 TB AG RESPONSE T-CELL SUSP: CPT

## 2021-11-16 PROCEDURE — 85652 RBC SED RATE AUTOMATED: CPT

## 2021-11-16 PROCEDURE — 97112 NEUROMUSCULAR REEDUCATION: CPT | Mod: GP | Performed by: PHYSICAL THERAPIST

## 2021-11-16 PROCEDURE — 99000 SPECIMEN HANDLING OFFICE-LAB: CPT

## 2021-11-16 PROCEDURE — 82310 ASSAY OF CALCIUM: CPT

## 2021-11-16 PROCEDURE — 76642 ULTRASOUND BREAST LIMITED: CPT | Mod: RT

## 2021-11-16 PROCEDURE — 86255 FLUORESCENT ANTIBODY SCREEN: CPT

## 2021-11-16 PROCEDURE — 84450 TRANSFERASE (AST) (SGOT): CPT

## 2021-11-16 PROCEDURE — 84460 ALANINE AMINO (ALT) (SGPT): CPT

## 2021-11-16 PROCEDURE — 86803 HEPATITIS C AB TEST: CPT

## 2021-11-16 PROCEDURE — 86200 CCP ANTIBODY: CPT

## 2021-11-16 PROCEDURE — 86141 C-REACTIVE PROTEIN HS: CPT

## 2021-11-16 PROCEDURE — 82565 ASSAY OF CREATININE: CPT

## 2021-11-16 PROCEDURE — 85025 COMPLETE CBC W/AUTO DIFF WBC: CPT

## 2021-11-16 PROCEDURE — 87340 HEPATITIS B SURFACE AG IA: CPT

## 2021-11-16 PROCEDURE — 82164 ANGIOTENSIN I ENZYME TEST: CPT | Mod: 90

## 2021-11-16 PROCEDURE — 97110 THERAPEUTIC EXERCISES: CPT | Mod: GP | Performed by: PHYSICAL THERAPIST

## 2021-11-16 PROCEDURE — 82306 VITAMIN D 25 HYDROXY: CPT

## 2021-11-16 PROCEDURE — 82728 ASSAY OF FERRITIN: CPT

## 2021-11-16 PROCEDURE — 86618 LYME DISEASE ANTIBODY: CPT

## 2021-11-16 PROCEDURE — 36415 COLL VENOUS BLD VENIPUNCTURE: CPT

## 2021-11-16 PROCEDURE — 86038 ANTINUCLEAR ANTIBODIES: CPT

## 2021-11-16 PROCEDURE — 97140 MANUAL THERAPY 1/> REGIONS: CPT | Mod: GP | Performed by: PHYSICAL THERAPIST

## 2021-11-16 PROCEDURE — 86256 FLUORESCENT ANTIBODY TITER: CPT

## 2021-11-16 PROCEDURE — 86225 DNA ANTIBODY NATIVE: CPT

## 2021-11-17 LAB
ACE SERPL-CCNC: 24 U/L
ANA SER QL IF: NEGATIVE
ANCA AB PATTERN SER IF-IMP: NORMAL
C-ANCA TITR SER IF: NORMAL {TITER}
CCP AB SER IA-ACNC: 0.5 U/ML
DEPRECATED CALCIDIOL+CALCIFEROL SERPL-MC: 22 UG/L (ref 30–80)
DSDNA AB SER-ACNC: 2.1 IU/ML
GAMMA INTERFERON BACKGROUND BLD IA-ACNC: 0.02 IU/ML
HBV SURFACE AG SERPL QL IA: NONREACTIVE
HCV AB SERPL QL IA: NEGATIVE
M TB IFN-G BLD-IMP: NEGATIVE
M TB IFN-G CD4+ BCKGRND COR BLD-ACNC: 9.98 IU/ML
MITOGEN IGNF BCKGRD COR BLD-ACNC: 0 IU/ML
MITOGEN IGNF BCKGRD COR BLD-ACNC: 0.11 IU/ML
QUANTIFERON MITOGEN: 10 IU/ML
QUANTIFERON NIL TUBE: 0.02 IU/ML
QUANTIFERON TB1 TUBE: 0.13 IU/ML
QUANTIFERON TB2 TUBE: 0.02

## 2021-11-18 ENCOUNTER — HOSPITAL ENCOUNTER (OUTPATIENT)
Dept: PHYSICAL THERAPY | Facility: REHABILITATION | Age: 36
End: 2021-11-18
Attending: INTERNAL MEDICINE
Payer: COMMERCIAL

## 2021-11-18 DIAGNOSIS — M70.62 TROCHANTERIC BURSITIS OF BOTH HIPS: ICD-10-CM

## 2021-11-18 DIAGNOSIS — G89.29 CHRONIC NECK PAIN: ICD-10-CM

## 2021-11-18 DIAGNOSIS — M25.562 CHRONIC PAIN OF BOTH KNEES: ICD-10-CM

## 2021-11-18 DIAGNOSIS — G89.29 CHRONIC UPPER BACK PAIN: ICD-10-CM

## 2021-11-18 DIAGNOSIS — G89.29 CHRONIC PAIN OF MULTIPLE JOINTS: ICD-10-CM

## 2021-11-18 DIAGNOSIS — M54.2 CHRONIC NECK PAIN: ICD-10-CM

## 2021-11-18 DIAGNOSIS — M25.561 CHRONIC PAIN OF BOTH KNEES: ICD-10-CM

## 2021-11-18 DIAGNOSIS — M25.50 CHRONIC PAIN OF MULTIPLE JOINTS: ICD-10-CM

## 2021-11-18 DIAGNOSIS — G89.29 CHRONIC PAIN OF BOTH KNEES: ICD-10-CM

## 2021-11-18 DIAGNOSIS — M54.9 CHRONIC UPPER BACK PAIN: ICD-10-CM

## 2021-11-18 DIAGNOSIS — M70.61 TROCHANTERIC BURSITIS OF BOTH HIPS: ICD-10-CM

## 2021-11-18 PROCEDURE — 97110 THERAPEUTIC EXERCISES: CPT | Mod: GP | Performed by: PHYSICAL THERAPIST

## 2021-11-18 PROCEDURE — 97535 SELF CARE MNGMENT TRAINING: CPT | Mod: GP | Performed by: PHYSICAL THERAPIST

## 2021-11-18 NOTE — PROGRESS NOTES
Date 11/18/2021    Exercise    Supine lower trunk rotation 10x   Supine single knee to chest 10x   Supine pretzel stretch 20 sec hold x 3   Supine hamstring stretch slider 10x   Supine diaphragmatic breathing In slowly for 4, hold for 4, out slowly for 4, to repeat 4 times   Supine posterior pelvic tilting 10x

## 2021-11-22 ENCOUNTER — TELEPHONE (OUTPATIENT)
Dept: RHEUMATOLOGY | Facility: CLINIC | Age: 36
End: 2021-11-22
Payer: COMMERCIAL

## 2021-11-22 DIAGNOSIS — M79.7 FIBROMYALGIA: Primary | ICD-10-CM

## 2021-11-23 ENCOUNTER — HOSPITAL ENCOUNTER (OUTPATIENT)
Dept: SPEECH THERAPY | Facility: REHABILITATION | Age: 36
End: 2021-11-23
Payer: COMMERCIAL

## 2021-11-23 DIAGNOSIS — R41.841 COGNITIVE COMMUNICATION DEFICIT: Primary | ICD-10-CM

## 2021-11-23 PROCEDURE — 92507 TX SP LANG VOICE COMM INDIV: CPT | Mod: GN | Performed by: SPEECH-LANGUAGE PATHOLOGIST

## 2021-11-23 NOTE — TELEPHONE ENCOUNTER
"I received the following staff message below, from physical therapist Christi Barry.    Can you please place order for TENS unit, as requested.  With the following instructions: To wear while working on PT exercises, as instructed by physical therapist.    Thanks      \"Christi Barry, PT  Chevy Carlton, DO  Mayco Carlton-   Thank you for sending Stephenie to physical therapy! I just saw her today for strengthening. I'm wondering if you would be open to putting in an order for a TENs unit for her. That way she can wear it while we are working on exercises.   Thanks   -Nya\"     "

## 2021-11-23 NOTE — PROGRESS NOTES
"[facility]    Outpatient Speech Language Pathology Progress Note  Patient: Stephenie Menendez  : 1985    Beginning/End Dates of Reporting Period:  21 to 21    Referring Provider: Bhupinder Arnold Diagnosis: Cognitive Communication Deficit    Client Self Report: Patient arrived to therapy in good spirits.  She was pleased to share that she had seen the rheumatologist and started 2 new medications, 1 for pain and a muscle relaxer for sleep.  Patient noted that she has had significantly improved restorative sleep and \"I'm so much clearer minded since sleeping\" better.  She expressed that she still feels fatigued mid afternoon, but this is no longer after doing 1 small task.  She feels her pain continues to ebb and flow, but is \"trending just a touch higher\".      Goals:  Goal Identifier 1   Goal Description Patient will recall x2 cognitive-linguistic compensatory strategies HENRY in order to improve cognitive function and manage symptoms by end of POC   Target Date 21   Date Met  21   Progress (detail required for progress note): 21: GOAL MET.  Patient has implemented planner and utilizes her phone to aid in recall.     Goal Identifier 2   Goal Description Patient will implement x2 cognitive-linguistic compensatory strategies in order to complete moderate level cognitive/memory tasks with 80% accuracy HENRY by end of POC.   Target Date 21   Date Met  (P) 21   Progress (detail required for progress note): (P) 21: Goal met.  Patient able to complete moderate level cognitive tasks with 100% accuracy x2 with strategy use.     Goal Identifier 3   Goal Description Patient will demonstrate/report HENRY x2 occurrences of modifying day/week to manage fatigue to prevent onset of concussion symptoms by end of POC.   Target Date 21   Date Met  (P) 21 " "  Progress (detail required for progress note): (P) 11/23/21: Goal met. Patient has been able to implement 15 minute calendar to plan/organize week/day to avoid over fatigue with increased independence.     Goal Identifier 4   Goal Description Patient will apply x3 word finding strategies during structured conversation HENRY by end of POC.   Target Date 11/12/21   Date Met  (P) 11/23/21   Progress (detail required for progress note): (P) 11/23/21: Goal considered met.  Patient minimally requires use of word finding strategies during therapy session, but has reported use in the home environment and demonstrates recall of at least 3 strategies.     Goal Identifier 5   Goal Description Patient will name x15 items in x3 concrete categories HENRY by end of POC.   Target Date 02/12/22   Date Met      Progress (detail required for progress note): (P) 11/23/21: Goal minimally addressed since last reporting period, able to name x16 covid symptoms with category narrowing strategy. \"9/14/21: Goal progressing.  Patient able to name up to x12 items in a concrete category in x1 minutes time with use of organizational strategies.  Continue goal.\"     Goal Identifier (P) 6   Goal Description (P) Patient will apply fatigue management strategies to reach a level 3 fatigue level no more than 2x reported per day per tracking on 15 minute schedule.   Target Date (P) 02/12/22   Date Met      Progress (detail required for progress note): (P) goal added 11/23/21     Goal Identifier (P) 7   Goal Description (P) Patient will learn and implement goal, plan, do, review strategy to problem solve/sequence steps to achieve x1 functional goal.   Target Date (P) 02/12/22   Date Met      Progress (detail required for progress note): (P) goal added 11/23/21       Plan:  Continue therapy per current plan of care.    Discharge:  No  "

## 2021-11-30 ENCOUNTER — HOSPITAL ENCOUNTER (OUTPATIENT)
Dept: PHYSICAL THERAPY | Facility: REHABILITATION | Age: 36
End: 2021-11-30
Payer: COMMERCIAL

## 2021-11-30 DIAGNOSIS — R68.89 EXERCISE INTOLERANCE: ICD-10-CM

## 2021-11-30 DIAGNOSIS — M25.562 CHRONIC PAIN OF BOTH KNEES: ICD-10-CM

## 2021-11-30 DIAGNOSIS — G89.29 CHRONIC PAIN OF BOTH KNEES: ICD-10-CM

## 2021-11-30 DIAGNOSIS — G89.29 CHRONIC PAIN OF MULTIPLE JOINTS: Primary | ICD-10-CM

## 2021-11-30 DIAGNOSIS — M70.62 TROCHANTERIC BURSITIS OF BOTH HIPS: ICD-10-CM

## 2021-11-30 DIAGNOSIS — M70.61 TROCHANTERIC BURSITIS OF BOTH HIPS: ICD-10-CM

## 2021-11-30 DIAGNOSIS — M25.50 CHRONIC PAIN OF MULTIPLE JOINTS: Primary | ICD-10-CM

## 2021-11-30 DIAGNOSIS — G89.29 CHRONIC UPPER BACK PAIN: ICD-10-CM

## 2021-11-30 DIAGNOSIS — G89.29 CHRONIC NECK PAIN: ICD-10-CM

## 2021-11-30 DIAGNOSIS — M54.2 CHRONIC NECK PAIN: ICD-10-CM

## 2021-11-30 DIAGNOSIS — M25.561 CHRONIC PAIN OF BOTH KNEES: ICD-10-CM

## 2021-11-30 DIAGNOSIS — M54.9 CHRONIC UPPER BACK PAIN: ICD-10-CM

## 2021-11-30 PROCEDURE — 97140 MANUAL THERAPY 1/> REGIONS: CPT | Mod: GP | Performed by: PHYSICAL THERAPIST

## 2021-12-09 ENCOUNTER — HOSPITAL ENCOUNTER (OUTPATIENT)
Dept: SPEECH THERAPY | Facility: REHABILITATION | Age: 36
End: 2021-12-09
Payer: COMMERCIAL

## 2021-12-09 ENCOUNTER — HOSPITAL ENCOUNTER (OUTPATIENT)
Dept: PHYSICAL THERAPY | Facility: REHABILITATION | Age: 36
End: 2021-12-09
Payer: COMMERCIAL

## 2021-12-09 DIAGNOSIS — R41.841 COGNITIVE COMMUNICATION DEFICIT: Primary | ICD-10-CM

## 2021-12-09 PROCEDURE — 92507 TX SP LANG VOICE COMM INDIV: CPT | Mod: GN | Performed by: SPEECH-LANGUAGE PATHOLOGIST

## 2021-12-09 PROCEDURE — 97110 THERAPEUTIC EXERCISES: CPT | Mod: GP | Performed by: PHYSICAL THERAPIST

## 2021-12-09 NOTE — PROGRESS NOTES
Currently: walking 10-20 min 2 days per week.    Date 12/9/2021 11/18/2021    Exercise     treadmill 2 minutes 1.8 MPH    Supine lower trunk rotation 5x 10x   Supine single knee to chest 20 sec bilat 10x   Supine pretzel stretch 20 sec bilat 20 sec hold x 3   Supine hamstring stretch slider 10x 10x   Supine diaphragmatic breathing 3x In slowly for 4, hold for 4, out slowly for 4, to repeat 4 times   Supine posterior pelvic tilting 3x 10x   bridge 5x added to HEP    Standing shoulder flexion into trunk flexion  3x    Standing lunge position with warrior 1 arms and added twists 3x bilat

## 2021-12-10 ENCOUNTER — HOSPITAL ENCOUNTER (OUTPATIENT)
Dept: PHYSICAL THERAPY | Facility: REHABILITATION | Age: 36
End: 2021-12-10
Payer: COMMERCIAL

## 2021-12-10 DIAGNOSIS — G89.29 CHRONIC PAIN OF MULTIPLE JOINTS: Primary | ICD-10-CM

## 2021-12-10 DIAGNOSIS — M70.62 TROCHANTERIC BURSITIS OF BOTH HIPS: ICD-10-CM

## 2021-12-10 DIAGNOSIS — G89.29 CHRONIC PAIN OF BOTH KNEES: ICD-10-CM

## 2021-12-10 DIAGNOSIS — M25.562 CHRONIC PAIN OF BOTH KNEES: ICD-10-CM

## 2021-12-10 DIAGNOSIS — M54.9 CHRONIC UPPER BACK PAIN: ICD-10-CM

## 2021-12-10 DIAGNOSIS — M70.61 TROCHANTERIC BURSITIS OF BOTH HIPS: ICD-10-CM

## 2021-12-10 DIAGNOSIS — M25.561 CHRONIC PAIN OF BOTH KNEES: ICD-10-CM

## 2021-12-10 DIAGNOSIS — M54.2 CHRONIC NECK PAIN: ICD-10-CM

## 2021-12-10 DIAGNOSIS — M25.50 CHRONIC PAIN OF MULTIPLE JOINTS: Primary | ICD-10-CM

## 2021-12-10 DIAGNOSIS — R68.89 EXERCISE INTOLERANCE: ICD-10-CM

## 2021-12-10 DIAGNOSIS — G89.29 CHRONIC NECK PAIN: ICD-10-CM

## 2021-12-10 DIAGNOSIS — G89.29 CHRONIC UPPER BACK PAIN: ICD-10-CM

## 2021-12-10 PROCEDURE — 97110 THERAPEUTIC EXERCISES: CPT | Mod: GP | Performed by: PHYSICAL THERAPIST

## 2021-12-10 PROCEDURE — 97140 MANUAL THERAPY 1/> REGIONS: CPT | Mod: GP | Performed by: PHYSICAL THERAPIST

## 2021-12-21 ENCOUNTER — HOSPITAL ENCOUNTER (OUTPATIENT)
Dept: SPEECH THERAPY | Facility: REHABILITATION | Age: 36
End: 2021-12-21
Payer: COMMERCIAL

## 2021-12-21 DIAGNOSIS — R41.841 COGNITIVE COMMUNICATION DEFICIT: Primary | ICD-10-CM

## 2021-12-21 PROCEDURE — 92507 TX SP LANG VOICE COMM INDIV: CPT | Mod: GN | Performed by: SPEECH-LANGUAGE PATHOLOGIST

## 2021-12-22 ENCOUNTER — HOSPITAL ENCOUNTER (OUTPATIENT)
Dept: PHYSICAL THERAPY | Facility: REHABILITATION | Age: 36
End: 2021-12-22
Payer: COMMERCIAL

## 2021-12-22 DIAGNOSIS — G89.29 CHRONIC UPPER BACK PAIN: ICD-10-CM

## 2021-12-22 DIAGNOSIS — G89.29 CHRONIC PAIN OF BOTH KNEES: ICD-10-CM

## 2021-12-22 DIAGNOSIS — G89.29 CHRONIC PAIN OF MULTIPLE JOINTS: Primary | ICD-10-CM

## 2021-12-22 DIAGNOSIS — M25.50 CHRONIC PAIN OF MULTIPLE JOINTS: Primary | ICD-10-CM

## 2021-12-22 DIAGNOSIS — R68.89 EXERCISE INTOLERANCE: ICD-10-CM

## 2021-12-22 DIAGNOSIS — M25.562 CHRONIC PAIN OF BOTH KNEES: ICD-10-CM

## 2021-12-22 DIAGNOSIS — G89.29 CHRONIC NECK PAIN: ICD-10-CM

## 2021-12-22 DIAGNOSIS — Z86.16 PERSONAL HISTORY OF COVID-19: ICD-10-CM

## 2021-12-22 DIAGNOSIS — M70.62 TROCHANTERIC BURSITIS OF BOTH HIPS: ICD-10-CM

## 2021-12-22 DIAGNOSIS — M70.61 TROCHANTERIC BURSITIS OF BOTH HIPS: ICD-10-CM

## 2021-12-22 DIAGNOSIS — M25.561 CHRONIC PAIN OF BOTH KNEES: ICD-10-CM

## 2021-12-22 DIAGNOSIS — M54.9 CHRONIC UPPER BACK PAIN: ICD-10-CM

## 2021-12-22 DIAGNOSIS — M54.2 CHRONIC NECK PAIN: ICD-10-CM

## 2021-12-22 PROCEDURE — 97110 THERAPEUTIC EXERCISES: CPT | Mod: GP | Performed by: PHYSICAL THERAPIST

## 2021-12-22 NOTE — PROGRESS NOTES
Currently: walking 10-20 min 2 days per week.    Date 12/22/2021 12/9/2021 11/18/2021    Exercise      Treadmill/Nustep Nustep 5 min level 3 Treadmill 2 minutes 1.8 MPH    Supine lower trunk rotation 5x 5x 10x   Supine single knee to chest 5 sec hold x10 20 sec bilat 10x   Supine pretzel stretch 5 sec hold x 2 20 sec bilat 20 sec hold x 3   Supine hamstring stretch slider 5x 10x 10x   Supine diaphragmatic breathing 4x 3x In slowly for 4, hold for 4, out slowly for 4, to repeat 4 times   Supine posterior pelvic tilting 10x 3x 10x   bridge 5x 5x added to HEP    Standing shoulder flexion into trunk flexion  4x 3x    Standing lunge position with warrior 1 arms and added twists 4x bilat 3x bilat    Sit <> stand 5x, challenging, added to HEP 3 times per week     Supine abdominals with 90/90 leg holds (taps if comfortable with this) Added to HEP     sidelying hip abduction 5x bilat, added to HEP

## 2021-12-24 ENCOUNTER — HOSPITAL ENCOUNTER (OUTPATIENT)
Dept: PHYSICAL THERAPY | Facility: REHABILITATION | Age: 36
End: 2021-12-24
Payer: COMMERCIAL

## 2021-12-24 DIAGNOSIS — G89.29 CHRONIC PAIN OF MULTIPLE JOINTS: Primary | ICD-10-CM

## 2021-12-24 DIAGNOSIS — M54.2 CHRONIC NECK PAIN: ICD-10-CM

## 2021-12-24 DIAGNOSIS — M70.62 TROCHANTERIC BURSITIS OF BOTH HIPS: ICD-10-CM

## 2021-12-24 DIAGNOSIS — M25.562 CHRONIC PAIN OF BOTH KNEES: ICD-10-CM

## 2021-12-24 DIAGNOSIS — R68.89 EXERCISE INTOLERANCE: ICD-10-CM

## 2021-12-24 DIAGNOSIS — M25.561 CHRONIC PAIN OF BOTH KNEES: ICD-10-CM

## 2021-12-24 DIAGNOSIS — M70.61 TROCHANTERIC BURSITIS OF BOTH HIPS: ICD-10-CM

## 2021-12-24 DIAGNOSIS — M54.9 CHRONIC UPPER BACK PAIN: ICD-10-CM

## 2021-12-24 DIAGNOSIS — M25.50 CHRONIC PAIN OF MULTIPLE JOINTS: Primary | ICD-10-CM

## 2021-12-24 DIAGNOSIS — G89.29 CHRONIC PAIN OF BOTH KNEES: ICD-10-CM

## 2021-12-24 DIAGNOSIS — G89.29 CHRONIC UPPER BACK PAIN: ICD-10-CM

## 2021-12-24 DIAGNOSIS — G89.29 CHRONIC NECK PAIN: ICD-10-CM

## 2021-12-24 PROCEDURE — 97140 MANUAL THERAPY 1/> REGIONS: CPT | Mod: GP | Performed by: PHYSICAL THERAPIST

## 2022-01-19 ENCOUNTER — HOSPITAL ENCOUNTER (OUTPATIENT)
Dept: PHYSICAL THERAPY | Facility: REHABILITATION | Age: 37
End: 2022-01-19
Payer: COMMERCIAL

## 2022-01-19 DIAGNOSIS — M25.50 CHRONIC PAIN OF MULTIPLE JOINTS: Primary | ICD-10-CM

## 2022-01-19 DIAGNOSIS — G89.29 CHRONIC PAIN OF MULTIPLE JOINTS: Primary | ICD-10-CM

## 2022-01-19 PROCEDURE — 97110 THERAPEUTIC EXERCISES: CPT | Mod: GP | Performed by: PHYSICAL THERAPIST

## 2022-01-19 NOTE — PROGRESS NOTES
Date 1/19/2022 12/22/2021 12/9/2021 11/18/2021    Exercise       Treadmill/Nustep  Nustep 5 min level 3 Treadmill 2 minutes 1.8 MPH    Supine lower trunk rotation  5x 5x 10x   Supine single knee to chest  5 sec hold x10 20 sec bilat 10x   Supine pretzel stretch  5 sec hold x 2 20 sec bilat 20 sec hold x 3   Supine hamstring stretch slider  5x 10x 10x   Supine diaphragmatic breathing  4x 3x In slowly for 4, hold for 4, out slowly for 4, to repeat 4 times   Supine posterior pelvic tilting  10x 3x 10x   bridge Add band to it when ready 5x 5x added to HEP    Standing shoulder flexion into trunk flexion   4x 3x    Standing lunge position with warrior 1 arms and added twists  4x bilat 3x bilat    Sit <> stand Add band when ready 5x, challenging, added to HEP 3 times per week     Supine abdominals with 90/90 leg holds (taps if comfortable with this) Add band when ready Added to HEP     sidelying hip abduction Add band when ready 5x bilat, added to HEP     Shoulder ER with band Added to HEP      Bicep curl with band Added to HEP      tricep extension with band Added to HEP

## 2022-01-25 NOTE — ADDENDUM NOTE
Encounter addended by: Samina Carlson, SLP on: 1/25/2022 8:44 AM   Actions taken: Clinical Note Signed, Episode resolved

## 2022-01-25 NOTE — PROGRESS NOTES
"[facility]    Outpatient Speech Language Pathology Discharge Note  Patient: Stephenie Menendez  : 1985    Beginning/End Dates of Reporting Period:  21 to 21- x13 visits    Referring Provider: Bhupinder Ortega    Therapy Diagnosis: Cognitive Communication Defict    Client Self Report: Patient arrived to therapy in good spirits.  She reported she has been doing well again and getting better sleep.  She expressed that she is able to get through a morning of tasks, but is \"still trying not to push myself\" and recognize her boundaries.  Her rest breaks are now met by sitting on the couch while still interacting with her kids vs sleeping in room with door shut.  She stated that \"overall pain is calming\".  Patient noted to be engaged and energetic during session, with onset of headache ~30 minutes in as conversation/cognitive tasks increased.     Goals:  Goal Identifier 1   Goal Description Patient will recall x2 cognitive-linguistic compensatory strategies HENRY in order to improve cognitive function and manage symptoms by end of POC   Target Date 21   Date Met  21   Progress (detail required for progress note): 21: GOAL MET.  Patient has implemented planner and utilizes her phone to aid in recall.     Goal Identifier 2   Goal Description Patient will implement x2 cognitive-linguistic compensatory strategies in order to complete moderate level cognitive/memory tasks with 80% accuracy HENRY by end of POC.   Target Date 21   Date Met  21   Progress (detail required for progress note): 21: Goal met.  Patient able to complete moderate level cognitive tasks with 100% accuracy x2 with strategy use.     Goal Identifier 3   Goal Description Patient will demonstrate/report HENRY x2 occurrences of modifying day/week to manage fatigue to prevent onset of concussion symptoms by " end of POC.   Target Date 11/12/21   Date Met  11/23/21   Progress (detail required for progress note): 11/23/21: Goal met. Patient has been able to implement 15 minute calendar to plan/organize week/day to avoid over fatigue with increased independence.     Goal Identifier 4   Goal Description Patient will apply x3 word finding strategies during structured conversation HENRY by end of POC.   Target Date 11/12/21   Date Met  11/23/21   Progress (detail required for progress note): 11/23/21: Goal considered met.  Patient minimally requires use of word finding strategies during therapy session, but has reported use in the home environment and demonstrates recall of at least 3 strategies.     Goal Identifier 5   Goal Description Patient will name x15 items in x3 concrete categories HENRY by end of POC.   Target Date 02/12/22   Date Met      Progress (detail required for progress note): 12/21/21: Goal minimally addressed due to prioritization of other goals.  Suspect goal would be met with improved language/word finding the past 2 weeks with better sleep.  11/23/21: able to name x16 symptoms with strategy use.  9/14/21: able to name x12 in concrete category.     Goal Identifier 6   Goal Description Patient will apply fatigue management strategies to reach a level 3 fatigue level no more than 2x reported per day per tracking on 15 minute schedule.   Target Date 02/12/22   Date Met  12/21/21   Progress (detail required for progress note): 12/21/21: Goal met.  Patient reported decreased fatigue in past two weeks, and ability to implement rest break to reset effectively.  Patient noted level of rest needed has decreased (e.g., sitting on couch in busy living room vs sleeping in room with door shut).     Goal Identifier 7   Goal Description Patient will learn and implement goal, plan, do, review strategy to problem solve/sequence steps to achieve x1 functional goal.   Target Date 02/12/22   Date Met  12/21/21   Progress (detail  required for progress note): 12/21/21: Goal met.  Patient created x1 physical and x1 cognitive goal, with completion of physical goal on today's date.     Plan:  Discharge from therapy.    Discharge:    Reason for Discharge: Patient has met all goals.    Equipment Issued: NA    Discharge Plan: Patient to continue home program.

## 2022-02-09 ENCOUNTER — OFFICE VISIT (OUTPATIENT)
Dept: RHEUMATOLOGY | Facility: CLINIC | Age: 37
End: 2022-02-09
Payer: COMMERCIAL

## 2022-02-09 VITALS
DIASTOLIC BLOOD PRESSURE: 80 MMHG | WEIGHT: 165.6 LBS | HEART RATE: 84 BPM | BODY MASS INDEX: 25.99 KG/M2 | SYSTOLIC BLOOD PRESSURE: 126 MMHG | HEIGHT: 67 IN

## 2022-02-09 DIAGNOSIS — M25.561 CHRONIC PAIN OF BOTH KNEES: ICD-10-CM

## 2022-02-09 DIAGNOSIS — G89.29 CHRONIC PAIN OF BOTH KNEES: ICD-10-CM

## 2022-02-09 DIAGNOSIS — M54.9 CHRONIC UPPER BACK PAIN: ICD-10-CM

## 2022-02-09 DIAGNOSIS — M54.2 CHRONIC NECK PAIN: ICD-10-CM

## 2022-02-09 DIAGNOSIS — M25.50 CHRONIC PAIN OF MULTIPLE JOINTS: ICD-10-CM

## 2022-02-09 DIAGNOSIS — G89.29 CHRONIC PAIN OF MULTIPLE JOINTS: ICD-10-CM

## 2022-02-09 DIAGNOSIS — E55.9 VITAMIN D DEFICIENCY: ICD-10-CM

## 2022-02-09 DIAGNOSIS — D50.9 IRON DEFICIENCY ANEMIA, UNSPECIFIED IRON DEFICIENCY ANEMIA TYPE: ICD-10-CM

## 2022-02-09 DIAGNOSIS — M70.62 TROCHANTERIC BURSITIS OF BOTH HIPS: ICD-10-CM

## 2022-02-09 DIAGNOSIS — M70.61 TROCHANTERIC BURSITIS OF BOTH HIPS: ICD-10-CM

## 2022-02-09 DIAGNOSIS — Z79.1 NSAID LONG-TERM USE: ICD-10-CM

## 2022-02-09 DIAGNOSIS — M25.562 CHRONIC PAIN OF BOTH KNEES: ICD-10-CM

## 2022-02-09 DIAGNOSIS — M79.7 FIBROMYALGIA: Primary | ICD-10-CM

## 2022-02-09 DIAGNOSIS — G89.29 CHRONIC NECK PAIN: ICD-10-CM

## 2022-02-09 DIAGNOSIS — G89.29 CHRONIC UPPER BACK PAIN: ICD-10-CM

## 2022-02-09 LAB
ALBUMIN SERPL-MCNC: 4.6 G/DL (ref 3.5–5)
ALT SERPL W P-5'-P-CCNC: 17 U/L (ref 0–45)
AST SERPL W P-5'-P-CCNC: 20 U/L (ref 0–40)
BASOPHILS # BLD AUTO: 0 10E3/UL (ref 0–0.2)
BASOPHILS NFR BLD AUTO: 0 %
C REACTIVE PROTEIN LHE: <0.1 MG/DL (ref 0–0.8)
CREAT SERPL-MCNC: 0.78 MG/DL (ref 0.6–1.1)
EOSINOPHIL # BLD AUTO: 0.1 10E3/UL (ref 0–0.7)
EOSINOPHIL NFR BLD AUTO: 2 %
ERYTHROCYTE [DISTWIDTH] IN BLOOD BY AUTOMATED COUNT: 15.9 % (ref 10–15)
ERYTHROCYTE [SEDIMENTATION RATE] IN BLOOD BY WESTERGREN METHOD: 7 MM/HR (ref 0–20)
FERRITIN SERPL-MCNC: 5 NG/ML (ref 10–130)
GFR SERPL CREATININE-BSD FRML MDRD: >90 ML/MIN/1.73M2
HCT VFR BLD AUTO: 36.6 % (ref 35–47)
HGB BLD-MCNC: 11.3 G/DL (ref 11.7–15.7)
IMM GRANULOCYTES # BLD: 0 10E3/UL
IMM GRANULOCYTES NFR BLD: 0 %
LYMPHOCYTES # BLD AUTO: 1.7 10E3/UL (ref 0.8–5.3)
LYMPHOCYTES NFR BLD AUTO: 27 %
MCH RBC QN AUTO: 24.3 PG (ref 26.5–33)
MCHC RBC AUTO-ENTMCNC: 30.9 G/DL (ref 31.5–36.5)
MCV RBC AUTO: 79 FL (ref 78–100)
MONOCYTES # BLD AUTO: 0.5 10E3/UL (ref 0–1.3)
MONOCYTES NFR BLD AUTO: 7 %
NEUTROPHILS # BLD AUTO: 4.1 10E3/UL (ref 1.6–8.3)
NEUTROPHILS NFR BLD AUTO: 64 %
PLATELET # BLD AUTO: 302 10E3/UL (ref 150–450)
RBC # BLD AUTO: 4.65 10E6/UL (ref 3.8–5.2)
WBC # BLD AUTO: 6.4 10E3/UL (ref 4–11)

## 2022-02-09 PROCEDURE — 86140 C-REACTIVE PROTEIN: CPT | Performed by: INTERNAL MEDICINE

## 2022-02-09 PROCEDURE — 99215 OFFICE O/P EST HI 40 MIN: CPT | Performed by: INTERNAL MEDICINE

## 2022-02-09 PROCEDURE — 85652 RBC SED RATE AUTOMATED: CPT | Performed by: INTERNAL MEDICINE

## 2022-02-09 PROCEDURE — 84450 TRANSFERASE (AST) (SGOT): CPT | Performed by: INTERNAL MEDICINE

## 2022-02-09 PROCEDURE — 82565 ASSAY OF CREATININE: CPT | Performed by: INTERNAL MEDICINE

## 2022-02-09 PROCEDURE — 82728 ASSAY OF FERRITIN: CPT | Performed by: INTERNAL MEDICINE

## 2022-02-09 PROCEDURE — 36415 COLL VENOUS BLD VENIPUNCTURE: CPT | Performed by: INTERNAL MEDICINE

## 2022-02-09 PROCEDURE — 85025 COMPLETE CBC W/AUTO DIFF WBC: CPT | Performed by: INTERNAL MEDICINE

## 2022-02-09 PROCEDURE — 82040 ASSAY OF SERUM ALBUMIN: CPT | Performed by: INTERNAL MEDICINE

## 2022-02-09 PROCEDURE — 84460 ALANINE AMINO (ALT) (SGPT): CPT | Performed by: INTERNAL MEDICINE

## 2022-02-09 RX ORDER — GABAPENTIN 100 MG/1
CAPSULE ORAL
Qty: 180 CAPSULE | Refills: 3 | Status: SHIPPED | OUTPATIENT
Start: 2022-02-09 | End: 2022-08-03

## 2022-02-09 RX ORDER — MELOXICAM 7.5 MG/1
TABLET ORAL
Qty: 180 TABLET | Refills: 1 | Status: SHIPPED | OUTPATIENT
Start: 2022-02-09 | End: 2022-11-08

## 2022-02-09 RX ORDER — CYCLOBENZAPRINE HCL 10 MG
TABLET ORAL
Qty: 90 TABLET | Refills: 1 | Status: SHIPPED | OUTPATIENT
Start: 2022-02-09 | End: 2022-11-08

## 2022-02-09 ASSESSMENT — MIFFLIN-ST. JEOR: SCORE: 1468.79

## 2022-02-09 NOTE — PATIENT INSTRUCTIONS
Summary of Your Rheumatology Visit    Next Appointment:    4 Months, in clinic.    Medications:    Please follow directives on pill bottle on how to take medication(s) provided.      Referrals:      Tests:     Please have labs performed today and a few days prior to next visit.    Injections:        Other:

## 2022-02-09 NOTE — PROGRESS NOTES
Stephenie Menendez who presents today with a chief complaint of  RECHECK      Joint Pains: Yes  Location: both hands   Onset: chronic 2 years  Intensity: 6 /10  AM Stiffness: yes, 30-60 Minutes  Alleviating/Aggravating Factors: yes Medications helpful  Tolerating Meds: Yes tolerate medication   Other:  Flexeril is helpful and meloxicam help a little.       ROS:  Patient denies having any active: chest pain, shortness of breath, cough, abdominal pain, nausea, vomiting, rashes, documented fevers, oral ulcers and recent infections.  +Patient admits to having a good appetite.  Information gathered by medical assistant incorporated into this note, was reviewed and discussed with the patient.    Problem List:  Patient Active Problem List   Diagnosis     Anemia     Vitamin D deficiency     Temporomandibular joint disorder     Neck pain     Preventative health care     ASCUS of cervix with negative high risk HPV     Mixed hyperlipidemia     Chronic pain of left knee     Personal history of COVID-19     Exercise intolerance     BMI 26.0-26.9,adult/ overweight     Recurrent major depressive episodes (H)     Acute antritis     Adjustment disorder with mixed anxiety and depressed mood     Fatigue     VALARIE (generalized anxiety disorder)     Major depressive disorder, recurrent, in partial remission (H)     Palpitations     Breast lump on right side at 10 o'clock position        PMH:   Past Medical History:   Diagnosis Date     Anemia      Arthritis      Breast asymmetry in female 2018     Depression      Depressive disorder      Fibroadenoma of breast, left 9/15/2021     Leukopenia 2018     Major depressive disorder, recurrent episode (H)     celexa 10 mg po q day   Replacement Utility updated for latest IMO load       Surgical History:  Past Surgical History:   Procedure Laterality Date     TUBAL LIGATION       ZZC  DELIVERY ONLY      Description:  Section;  Recorded: 2014;       Family  History:  Family History   Problem Relation Age of Onset     Hypertension Mother      Hyperlipidemia Mother      Hyperthyroidism Mother         benign mass removed.     Hypertension Father      Prostate Cancer Father      Hyperlipidemia Father      Anxiety Disorder Father      Depression Brother      Anxiety Disorder Brother      Autism Spectrum Disorder Daughter      Attention Deficit Disorder Son      Autism Spectrum Disorder Son      Diabetes Maternal Grandmother      Depression Maternal Grandmother      Heart Disease Maternal Grandmother      Hypertension Maternal Grandmother      Colon Cancer Maternal Grandmother      Lung Cancer Maternal Grandmother      Goiter Maternal Grandmother      Hypertension Maternal Grandfather      Hypertension Paternal Grandmother      Cancer Paternal Grandmother         stomach     Hypertension Paternal Grandfather      Breast Cancer No family hx of        Social History:   reports that she has never smoked. She has never used smokeless tobacco. She reports current alcohol use. She reports that she does not use drugs.    Allergies:  Allergies   Allergen Reactions     Cymbalta Difficulty breathing and Rash     Duloxetine Rash and Shortness Of Breath     THROAT CONSTRICTION     Imitrex [Sumatriptan]      Respiratory distress.        Current Medications:  Current Outpatient Medications   Medication Sig Dispense Refill     Acetaminophen (TYLENOL PO) Take  by mouth as needed.       cyclobenzaprine (FLEXERIL) 10 MG tablet Take 1 tab p.o. qhs (if feeling groggy the following morning, can try taking half a tablet instead or can take earlier the night before). 30 tablet 2     levalbuterol (XOPENEX HFA) 45 MCG/ACT inhaler Inhale 2 puffs into the lungs every 6 hours as needed for shortness of breath / dyspnea or wheezing 15 g 3     meloxicam (MOBIC) 7.5 MG tablet Take 1 tablet p.o. twice daily or 2 tabs p.o. daily, prn. Take with food. 60 tablet 2           Physical Exam:  /80 (BP  "Location: Right arm, Patient Position: Sitting, Cuff Size: Adult Regular)   Pulse 84   Ht 1.702 m (5' 7\")   Wt 75.1 kg (165 lb 9.6 oz)   BMI 25.94 kg/m    General: A & O x 3 in NAD  HEENT: EOMI, Non injected/non icteric sclera, no oral lesions noted  CV: s1s2 with RRR, no rubs appreciated   Lungs: CTA B/L, no wheezing , rales or rhonci appreciated  GI: Soft, NT/ND, no rebound, no guarding noted  MS: Diffuse tenderness involving left PIPs/DIPs and right third DIP, fourth PIP joints with no clear signs of synovitis noted.  Also tender over bilateral wrists, with no clear signs of synovitis noted.  18/18 exquisitely tender myofascial tender points.  Otherwise patient demonstrated good passive/active ROM over other joints with no warmth, erythema, tenderness or synovitis noted over these joints.      Summary/Assessment:    History that includes chronic arthralgias/myalgias.    Presents for a follow-up visit.    States Flexeril has been very beneficial, sleeping better.    Takes meloxicam twice a day, also provides benefit.    Takes Tylenol arthritis 2 tablets once a day as needed which provides some benefit for breakthrough pain.    PT exercises have been helpful.  Also now has a TENS unit.    Denies having any active anxiety, depression or stress.    On exam today although has diffuse tenderness involving left greater than right hands/wrists, no clear signs of synovitis noted.  Has exquisitely tender 18/18 myofascial tender points.    States takes iron supplements regularly and despite doing so often noted to be anemic.  States when the labs performed and had ferritin level of 3 months ago, was taking iron regularly.  Admits to having heavy periods.  Has not seen hematology.    Was vitamin D deficient, taking supplements now.    Extensive autoimmune work-up was unremarkable.    Please see below for management plan.      From prior note: Presented on initial visit with chronic multiple joint pains and " myalgias.    Patient states that she came down with Covid in March 2020 (unknown source) and  continue to be ill for about 4 months thereafter.  Was not hospitalized for Covid symptoms.  Since then she has had periodic episodes of not feeling herself with increased arthralgias/myalgias, body aches, headaches, fatigue and periodic fevers ranging from .    Joints involved include: Hands, shoulders, knees, neck and sometimes feet)    Since onset of Covid has developed asthma.    States has periodic flares described above about once every 1 to 2 weeks, lasting a few days to few weeks, self-limiting, unknown trigger.    Patient saw Dr. Millan back in 2017-18 for polyarthralgias, found to have inflammatory arthropathy component.  States then although had less arthralgias/myalgias, had more joint swelling involving her hands.  Brief course of prednisone provided some relief however not complete relief at the time.  States methotrexate discussed however never started.    Patient adds that when she saw Dr. 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    Patient currently established with neurology, referred patient to us.  Has been experiencing some memory loss.    Has been taking Tylenol 1300 mg 2-3 times a day and ibuprofen 800 mg 3 times daily which provide some relief.    Admits to having chronic nonrestorative sleep.    States her depression has been stable currently not seeing a counselor or take medication.    Denies having increased stressors lately.    Denies family history for connective tissue disease.    Denies personal or family history for psoriasis.    Lately has not been very compliant with taking vitamin D, cut back on the summertime, has history of deficiency.    Given the above, difficult to tell at this time with certainty as to what the primary source is contributing to symptoms described.  Appears to have  component of fibromyalgia contributing given chronic nonrestorative sleep, chronic fatigue, diffuse nature pains and greater than 11/18 myofascial tender points.  However this is a diagnosis of exclusion sometimes may just be a secondary/superimposed condition.  Inflammatory arthritis also in the differential given questionable subtle fullness noted involving third/fifth PIPs.  Appears to have component of trochanteric bursitis affecting her lateral hips.  May have patellofemoral syndrome contributing to left greater than right knee pains.        Pertinent rheumatology/past medical history (please refer to above for more detailed history):      Chronic multiple joint pains (hands, knees, shoulders, sometimes feet)    Fibromyalgia    Chronic nonrestorative sleep/chronic fatigue    Periodic fevers    Chronic neck pain    Chronic upper back pain    Trochanteric bursitis, bilateral    Possible patellofemoral syndrome, left greater than right    Asthma (post Covid)    History of depression (stable)    Vitamin D deficiency    Iron deficiency anemia      Rheumatology medications provided/suggested:    Flexeril  Tylenol  Meloxicam      Pertinent medication from other providers or from otc (please refer to above for more detailed med list):    Xopenex inhaler  Tens unit  Iron supplements  Vitamin D    Pertinent medications already tried:     Ibuprofen  Plaquenil (ineffective and se per Dr. Millan note 5/18))    Pertinent lab history:    Negative/unremarkable: Rheumatoid factor, CCP antibody, NEY, double-stranded DNA, Lyme antibody, ACE level, ANCA, CBC, TB test, hepatitis B and C serologies, uric acid, ESR/CRP    Low: Hemoglobin, ferritin    Pertinent imaging/test history:    12/20, MRI left knee  1.  Intact left knee menisci, cruciate and collateral ligaments.  2.  Partial thickness cartilage fissuring and cartilage surface irregularity apex median ridge.  3.  Mild edema/inflammation in the suprapatellar fat pad.  4.   Tubular ganglion cyst in the anterior intercondylar notch extending into the deep aspect of Hoffa's fat pad.  5.  No acute knee fracture or stress fracture.    Chest x-ray unremarkable.    X-rays of the hands were unremarkable.    X-rays of knees were unremarkable.      Other:    , 3 children.     Not working right now. Previous work is office work administration and as an .     Drink 3 glass of red wine 3 times per week. No smoking. No drug use.     Tubal ligation since 2008. Patient still having menstrual periods.      Plan:    Continue Flexeril prior to bedtime.    Continue meloxicam 7.5 mg twice a day, as needed.    Continue Tylenol arthritis 2 tablets daily as needed.    We will add gabapentin starting at 100 mg 2-3 times a day, can increase as tolerated and if necessary up to 300 mg 3 times a day.    Continue exercises provided by PT for bilateral knee, trochanteric bursa and bilateral upper back/neck pains.    Deferred referral to hematology for chronic iron deficiency anemia despite taking iron supplements.    We will obtain some labs today and again prior to next visit.    Follow-up in 4 months, in clinic.      Procedure note:     Total time, spent 40 minutes involved with patient care, includes placing orders, reviewing records and formulating management plan.    This note was transcribed using Dragon voice recognition software as a result unintentional grammatical errors or word substitutions may have occurred. Please contact our Rheumatology department if you need any clarification or if you have any related inquiries.    Major side effect profile of medications provided were discussed with the patient.      Chevy Carlton DO    ..................  2/9/2022   5:03 PM

## 2022-03-21 NOTE — ADDENDUM NOTE
Encounter addended by: Christi Barry, PT on: 3/21/2022 2:37 PM   Actions taken: Episode resolved, Clinical Note Signed

## 2022-03-21 NOTE — PROGRESS NOTES
[facility]    Outpatient Physical Therapy Discharge Note  Patient: Stephenie Menendez  : 1985    Beginning/End Dates of Reporting Period: 9/3/2021 to 2022    Referring Provider: Dr. Carlton    Therapy Diagnosis: Pain of multiple joints     Client Self Report: see daily doc flowsheet    Objective Measurements: see daily doc flowsheet    Goals: see daily doc flowsheet    Plan: see daily doc flowsheet    Discharge: yes, patient did not return for further PT.

## 2022-05-06 ENCOUNTER — PATIENT OUTREACH (OUTPATIENT)
Dept: FAMILY MEDICINE | Facility: CLINIC | Age: 37
End: 2022-05-06
Payer: COMMERCIAL

## 2022-05-06 PROBLEM — R87.610 ASCUS OF CERVIX WITH NEGATIVE HIGH RISK HPV: Status: ACTIVE | Noted: 2018-12-11

## 2022-05-06 NOTE — LETTER
May 6, 2022      Stephenie Menendez  [address]  [place] [address]        Dear ,    This letter is to remind you that you are due for your follow-up Pap smear and Human Papillomavirus (HPV) test.    Please call [phone] to schedule your appointment at your earliest convenience.    If you have completed the appointment outside of the [facility] system, please have the records forwarded to our office. We will update your chart for your provider to review before your next annual wellness visit.     Thank you for choosing [facility]!      Sincerely,    Your [facility] Care Team

## 2022-06-03 NOTE — TELEPHONE ENCOUNTER
Patient due for Pap and HPV.    Reminder done today via telephone call-spoke to the pt she said that she has the phone number.

## 2022-07-01 NOTE — TELEPHONE ENCOUNTER
Mayco Matthews,   Patient is lost to pap tracking follow-up. Attempts to contact pt have been made per reminder process and there has been no reply and/or no appt scheduled.       Pap Hx:  4/2/14 NIL  11/28/18 ASCUS, neg HPV  5/26/21 ASCUS, neg HPV. Plan: cotest in 1 year  6/4/21 Mychart results sent / mychart read  05/6/22 Reminder Mychart, Letter  06/3/22 Reminder call-spoke to the pt she said that she has the phone number   07/1/22 Lost to follow-up for pap tracking, tima routed to provider

## 2022-07-09 ENCOUNTER — HEALTH MAINTENANCE LETTER (OUTPATIENT)
Age: 37
End: 2022-07-09

## 2022-07-22 ENCOUNTER — LAB (OUTPATIENT)
Dept: LAB | Facility: CLINIC | Age: 37
End: 2022-07-22
Payer: COMMERCIAL

## 2022-07-22 DIAGNOSIS — G89.29 CHRONIC PAIN OF MULTIPLE JOINTS: ICD-10-CM

## 2022-07-22 DIAGNOSIS — D50.9 IRON DEFICIENCY ANEMIA, UNSPECIFIED IRON DEFICIENCY ANEMIA TYPE: ICD-10-CM

## 2022-07-22 DIAGNOSIS — Z79.1 NSAID LONG-TERM USE: ICD-10-CM

## 2022-07-22 DIAGNOSIS — M25.50 CHRONIC PAIN OF MULTIPLE JOINTS: ICD-10-CM

## 2022-07-22 LAB
ALBUMIN SERPL BCG-MCNC: 4.5 G/DL (ref 3.5–5.2)
ALT SERPL W P-5'-P-CCNC: 15 U/L (ref 10–35)
AST SERPL W P-5'-P-CCNC: 25 U/L (ref 10–35)
BASOPHILS # BLD AUTO: 0 10E3/UL (ref 0–0.2)
BASOPHILS NFR BLD AUTO: 1 %
CREAT SERPL-MCNC: 0.75 MG/DL (ref 0.51–0.95)
CRP SERPL-MCNC: <3 MG/L
EOSINOPHIL # BLD AUTO: 0.2 10E3/UL (ref 0–0.7)
EOSINOPHIL NFR BLD AUTO: 6 %
ERYTHROCYTE [DISTWIDTH] IN BLOOD BY AUTOMATED COUNT: 14.5 % (ref 10–15)
ERYTHROCYTE [SEDIMENTATION RATE] IN BLOOD BY WESTERGREN METHOD: 5 MM/HR (ref 0–20)
FERRITIN SERPL-MCNC: 13 NG/ML (ref 6–175)
GFR SERPL CREATININE-BSD FRML MDRD: >90 ML/MIN/1.73M2
HCT VFR BLD AUTO: 35.5 % (ref 35–47)
HGB BLD-MCNC: 11 G/DL (ref 11.7–15.7)
IMM GRANULOCYTES # BLD: 0 10E3/UL
IMM GRANULOCYTES NFR BLD: 0 %
LYMPHOCYTES # BLD AUTO: 1.3 10E3/UL (ref 0.8–5.3)
LYMPHOCYTES NFR BLD AUTO: 33 %
MCH RBC QN AUTO: 24.8 PG (ref 26.5–33)
MCHC RBC AUTO-ENTMCNC: 31 G/DL (ref 31.5–36.5)
MCV RBC AUTO: 80 FL (ref 78–100)
MONOCYTES # BLD AUTO: 0.3 10E3/UL (ref 0–1.3)
MONOCYTES NFR BLD AUTO: 8 %
NEUTROPHILS # BLD AUTO: 2 10E3/UL (ref 1.6–8.3)
NEUTROPHILS NFR BLD AUTO: 53 %
PLATELET # BLD AUTO: 257 10E3/UL (ref 150–450)
RBC # BLD AUTO: 4.43 10E6/UL (ref 3.8–5.2)
WBC # BLD AUTO: 3.8 10E3/UL (ref 4–11)

## 2022-07-22 PROCEDURE — 82565 ASSAY OF CREATININE: CPT

## 2022-07-22 PROCEDURE — 84450 TRANSFERASE (AST) (SGOT): CPT

## 2022-07-22 PROCEDURE — 36415 COLL VENOUS BLD VENIPUNCTURE: CPT

## 2022-07-22 PROCEDURE — 84460 ALANINE AMINO (ALT) (SGPT): CPT

## 2022-07-22 PROCEDURE — 85652 RBC SED RATE AUTOMATED: CPT

## 2022-07-22 PROCEDURE — 82728 ASSAY OF FERRITIN: CPT

## 2022-07-22 PROCEDURE — 82040 ASSAY OF SERUM ALBUMIN: CPT

## 2022-07-22 PROCEDURE — 86140 C-REACTIVE PROTEIN: CPT

## 2022-07-22 PROCEDURE — 85025 COMPLETE CBC W/AUTO DIFF WBC: CPT

## 2022-07-27 ENCOUNTER — OFFICE VISIT (OUTPATIENT)
Dept: RHEUMATOLOGY | Facility: CLINIC | Age: 37
End: 2022-07-27
Payer: COMMERCIAL

## 2022-07-27 VITALS — SYSTOLIC BLOOD PRESSURE: 138 MMHG | DIASTOLIC BLOOD PRESSURE: 83 MMHG | HEART RATE: 76 BPM

## 2022-07-27 DIAGNOSIS — M70.62 TROCHANTERIC BURSITIS OF BOTH HIPS: ICD-10-CM

## 2022-07-27 DIAGNOSIS — M25.50 CHRONIC PAIN OF MULTIPLE JOINTS: ICD-10-CM

## 2022-07-27 DIAGNOSIS — G89.29 CHRONIC UPPER BACK PAIN: ICD-10-CM

## 2022-07-27 DIAGNOSIS — M79.7 FIBROMYALGIA: Primary | ICD-10-CM

## 2022-07-27 DIAGNOSIS — D50.9 IRON DEFICIENCY ANEMIA, UNSPECIFIED IRON DEFICIENCY ANEMIA TYPE: ICD-10-CM

## 2022-07-27 DIAGNOSIS — M54.9 CHRONIC UPPER BACK PAIN: ICD-10-CM

## 2022-07-27 DIAGNOSIS — M70.61 TROCHANTERIC BURSITIS OF BOTH HIPS: ICD-10-CM

## 2022-07-27 DIAGNOSIS — G89.29 CHRONIC PAIN OF MULTIPLE JOINTS: ICD-10-CM

## 2022-07-27 DIAGNOSIS — D72.819 LEUKOPENIA, UNSPECIFIED TYPE: ICD-10-CM

## 2022-07-27 DIAGNOSIS — Z79.1 NSAID LONG-TERM USE: ICD-10-CM

## 2022-07-27 PROCEDURE — 99215 OFFICE O/P EST HI 40 MIN: CPT | Performed by: INTERNAL MEDICINE

## 2022-07-27 RX ORDER — TIZANIDINE 2 MG/1
TABLET ORAL
Qty: 180 TABLET | Refills: 2 | Status: SHIPPED | OUTPATIENT
Start: 2022-07-27 | End: 2022-11-08

## 2022-07-27 NOTE — PATIENT INSTRUCTIONS
Summary of Your Rheumatology Visit    Next Appointment:   3-4 Months    Medications:    Please follow directives on pill bottle on how to take medication(s) provided.  Hold Flexeril while on Tizanidine.    Noted to have mild anemia, recommend taking 1 tablet of ferrous sulfate 325 mg daily (325 mg equals 65 mg of elemental iron).      Referrals:      Tests:     Please have labs performed in about 6 weeks.    When having labs performed again in 6 weeks recommend contacting us few days thereafter to have a provider comment on results.      Injections:      Other:

## 2022-07-27 NOTE — PROGRESS NOTES
Stephenie Menendez who presents today with a chief complaint of  No chief complaint on file.      Joint Pains:   Location: multiple joints  Onset: ongoing  Intensity: 5 /10  AM Stiffness: 30 Minutes  Alleviating/Aggravating Factors: meds and heating pad helps  Tolerating Meds: yes  Other:      ROS:  Patient denies having any chest pain, shortness of breath, cough, abdominal pain, nausea, vomiting, rashes, fevers, oral ulcers and recent infections.  Patient admits to having a good appetite      Problem List:  Patient Active Problem List   Diagnosis     Anemia     Vitamin D deficiency     Temporomandibular joint disorder     Neck pain     Preventative health care     ASCUS of cervix with negative high risk HPV     Mixed hyperlipidemia     Chronic pain of left knee     Personal history of COVID-19     Exercise intolerance     BMI 26.0-26.9,adult/ overweight     Recurrent major depressive episodes (H)     Acute antritis     Adjustment disorder with mixed anxiety and depressed mood     Fatigue     VALARIE (generalized anxiety disorder)     Major depressive disorder, recurrent, in partial remission (H)     Palpitations     Breast lump on right side at 10 o'clock position        PMH:   Past Medical History:   Diagnosis Date     Anemia      Arthritis      Breast asymmetry in female 2018     Depression      Depressive disorder      Fibroadenoma of breast, left 9/15/2021     Leukopenia 2018     Major depressive disorder, recurrent episode (H)     celexa 10 mg po q day   Replacement Utility updated for latest IMO load       Surgical History:  Past Surgical History:   Procedure Laterality Date     TUBAL LIGATION       ZZC  DELIVERY ONLY      Description:  Section;  Recorded: 2014;       Family History:  Family History   Problem Relation Age of Onset     Hypertension Mother      Hyperlipidemia Mother      Hyperthyroidism Mother         benign mass removed.     Hypertension Father      Prostate  Cancer Father      Hyperlipidemia Father      Anxiety Disorder Father      Depression Brother      Anxiety Disorder Brother      Autism Spectrum Disorder Daughter      Attention Deficit Disorder Son      Autism Spectrum Disorder Son      Diabetes Maternal Grandmother      Depression Maternal Grandmother      Heart Disease Maternal Grandmother      Hypertension Maternal Grandmother      Colon Cancer Maternal Grandmother      Lung Cancer Maternal Grandmother      Goiter Maternal Grandmother      Hypertension Maternal Grandfather      Hypertension Paternal Grandmother      Cancer Paternal Grandmother         stomach     Hypertension Paternal Grandfather      Breast Cancer No family hx of        Social History:   reports that she has never smoked. She has never used smokeless tobacco. She reports current alcohol use. She reports that she does not use drugs.    Allergies:  Allergies   Allergen Reactions     Cymbalta Difficulty breathing and Rash     Duloxetine Rash and Shortness Of Breath     THROAT CONSTRICTION     Imitrex [Sumatriptan]      Respiratory distress.        Current Medications:  Current Outpatient Medications   Medication Sig Dispense Refill     Acetaminophen (TYLENOL PO) Take  by mouth as needed.       cyclobenzaprine (FLEXERIL) 10 MG tablet Take 1 tab p.o. qhs (if feeling groggy the following morning, can try taking half a tablet instead or can take earlier the night before). 90 tablet 1     gabapentin (NEURONTIN) 100 MG capsule Take 1-3 tab po bid-tid, gradually increase prn and as tolerated. 180 capsule 3     levalbuterol (XOPENEX HFA) 45 MCG/ACT inhaler Inhale 2 puffs into the lungs every 6 hours as needed for shortness of breath / dyspnea or wheezing 15 g 3     meloxicam (MOBIC) 7.5 MG tablet Take 1 tablet p.o. twice daily or 2 tabs p.o. daily, prn. Take with food. 180 tablet 1           Physical Exam:  There were no vitals taken for this visit.  General: A & O x 3 in NAD  HEENT: EOMI, Non  injected/non icteric sclera, no oral lesions noted  CV: s1s2 with RRR, no rubs appreciated   Lungs: CTA B/L, no wheezing , rales or rhonci appreciated  GI: Soft, NT/ND, no rebound, no guarding noted  MS: Positive tenderness involving right fourth/fifth PIP joints on palpation with questionable subtle fullness.  Positive discomfort involving left fifth PIP/DIP joint on palpation with no clear signs of synovitis noted.  Passive flexion/extension of knees did not reproduce any pains, no warmth erythema or synovitis noted.  Passive shifting of patella bilaterally did not reproduce pains.  Negative patellar apprehension test bilaterally.  Positive discomfort involving lateral hips on passive range of motion testing/focal palpation.  Greater than 11/18 myofascial tender points.  Otherwise patient demonstrated good passive/active ROM over other joints with no warmth, erythema, tenderness or synovitis noted over these joints.        Summary/Assessment:    History that includes fibromyalgia, chronic arthralgias/myalgias.    Presents for a follow-up visit.    States has noticed some benefit with taking Flexeril prior to bedtime, meloxicam twice a day and Tylenol arthritis 2 tablets once a day in between meloxicam dosing.    Is also sleeping better with taking Flexeril prior to bedtime.    Had significant headache with gabapentin, discontinued.    Still has some chronic arthralgias/myalgias.    Notices triggers being cold weather, increased exertion and stress.    On exam noted to have greater than 11/18 myofascial tender points.  May have component of trochanteric bursitis contributing to lateral hip pains.  Questionable subtle fullness involving right fourth/PIP joints with some tenderness to  palpation.    Recent labs performed showed borderline anemia, with low normal ferritin level, currently not taking any iron supplements.    Very mild leukopenia with normal differential.    Normal ESR/CRP levels.    Prior extensive  autoimmune work-up was unremarkable.    Please see below for management plan.      From prior note: Presented on initial visit with chronic multiple joint pains and myalgias.    Patient states that she came down with Covid in March 2020 (unknown source) and  continue to be ill for about 4 months thereafter.  Was not hospitalized for Covid symptoms.  Since then she has had periodic episodes of not feeling herself with increased arthralgias/myalgias, body aches, headaches, fatigue and periodic fevers ranging from .    Joints involved include: Hands, shoulders, knees, neck and sometimes feet)    Since onset of Covid has developed asthma.    States has periodic flares described above about once every 1 to 2 weeks, lasting a few days to few weeks, self-limiting, unknown trigger.    Patient saw Dr. Millan back in 2017-18 for polyarthralgias, found to have inflammatory arthropathy component.  States then although had less arthralgias/myalgias, had more joint swelling involving her hands.  Brief course of prednisone provided some relief however not complete relief at the time.  States methotrexate discussed however never started.    Patient adds that when she saw Dr. 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    Patient currently established with neurology, referred patient to us.  Has been experiencing some memory loss.    Has been taking Tylenol 1300 mg 2-3 times a day and ibuprofen 800 mg 3 times daily which provide some relief.    Admits to having chronic nonrestorative sleep.    States her depression has been stable currently not seeing a counselor or take medication.    Denies having increased stressors lately.    Denies family history for connective tissue disease.    Denies personal or family history for psoriasis.    Lately has not been very compliant with taking vitamin D, cut back on the summertime, has history of  deficiency.    Given the above, difficult to tell at this time with certainty as to what the primary source is contributing to symptoms described.  Appears to have component of fibromyalgia contributing given chronic nonrestorative sleep, chronic fatigue, diffuse nature pains and greater than 11/18 myofascial tender points.  However this is a diagnosis of exclusion sometimes may just be a secondary/superimposed condition.  Inflammatory arthritis also in the differential given questionable subtle fullness noted involving third/fifth PIPs.  Appears to have component of trochanteric bursitis affecting her lateral hips.  May have patellofemoral syndrome contributing to left greater than right knee pains.        Pertinent rheumatology/past medical history (please refer to above for more detailed history):      Chronic multiple joint pains (hands, knees, shoulders, sometimes feet)    Fibromyalgia    Chronic nonrestorative sleep/chronic fatigue    Periodic fevers    Chronic neck pain    Chronic upper back pain    Trochanteric bursitis, bilateral    Possible patellofemoral syndrome, left greater than right    Asthma (post Covid)    History of depression (stable)    Vitamin D deficiency    Iron deficiency anemia      Rheumatology medications provided/suggested:    Tizanidine  Tylenol  Meloxicam  Ferrous sulfate      Pertinent medication from other providers or from otc (please refer to above for more detailed med list):    Xopenex inhaler  Tens unit  Iron supplements  Vitamin D    Pertinent medications already tried:     Flexeril (on hold, switched for tizanidine)  Ibuprofen  Plaquenil (ineffective and se per Dr. Millan note 5/18)  Cymbalta (rash)  Gabapentin (headaches)    Pertinent lab history:    Negative/unremarkable: Rheumatoid factor, CCP antibody, NEY, double-stranded DNA, Lyme antibody, ACE level, ANCA, CBC, TB test, hepatitis B and C serologies, uric acid, ESR/CRP    Low: Hemoglobin, ferritin    Pertinent  imaging/test history:    12/20, MRI left knee  1.  Intact left knee menisci, cruciate and collateral ligaments.  2.  Partial thickness cartilage fissuring and cartilage surface irregularity apex median ridge.  3.  Mild edema/inflammation in the suprapatellar fat pad.  4.  Tubular ganglion cyst in the anterior intercondylar notch extending into the deep aspect of Hoffa's fat pad.  5.  No acute knee fracture or stress fracture.    Chest x-ray unremarkable.    X-rays of the hands were unremarkable.    X-rays of knees were unremarkable.      Other:    , 3 children.     Not working right now. Previous work is office work administration and as an .     Drink 3 glass of red wine 3 times per week. No smoking. No drug use.     Tubal ligation since 2008. Patient still having menstrual periods.        Plan:    Hold Flexeril, will try tizanidine low-dose during the day and higher dosing prior to bedtime, to be taken as needed.  Discussed possibly trying Lyrica with with caution if still symptomatic (however, had headaches on gabapentin).  Continue meloxicam 7.5 mg twice a day, as needed.    Continue Tylenol arthritis 2 tablets daily as needed.    Continue exercises provided by PT for bilateral knee, trochanteric bursa and bilateral upper back/neck pains.    Lately not taking iron supplements, suggested taking 1 tablet of ferrous sulfate 325 mg daily.  Noted on patient's AVS.  Also mentioned that she should discuss further with her PCP.    Handouts provided on methotrexate and sulfasalazine to discuss further with next rheumatologist if still experiencing joint pains and has associated swelling/inflammation.      Recheck labs in 6 weeks.Made aware to call thereafter rheumatology department for team RN or provider to review lab results.    Follow-up in 3-4 months, in clinic.    Total time, spent 42 minutes involved with patient care, includes placing orders, reviewing records and formulating management  plan.      This note was transcribed using Dragon voice recognition software as a result unintentional grammatical errors or word substitutions may have occurred. Please contact our Rheumatology department if you need any clarification or if you have any related inquiries.    Major side effect profile of medications provided were discussed with the patient.      Chevy Carlton DO   ....................  7/27/2022   2:04 PM

## 2022-08-03 ENCOUNTER — OFFICE VISIT (OUTPATIENT)
Dept: FAMILY MEDICINE | Facility: CLINIC | Age: 37
End: 2022-08-03
Payer: COMMERCIAL

## 2022-08-03 VITALS
RESPIRATION RATE: 14 BRPM | OXYGEN SATURATION: 98 % | HEART RATE: 90 BPM | DIASTOLIC BLOOD PRESSURE: 80 MMHG | WEIGHT: 165.9 LBS | HEIGHT: 67 IN | BODY MASS INDEX: 26.04 KG/M2 | SYSTOLIC BLOOD PRESSURE: 136 MMHG

## 2022-08-03 DIAGNOSIS — Z11.4 SCREENING FOR HIV (HUMAN IMMUNODEFICIENCY VIRUS): ICD-10-CM

## 2022-08-03 DIAGNOSIS — N63.11 BREAST LUMP ON RIGHT SIDE AT 10 O'CLOCK POSITION: ICD-10-CM

## 2022-08-03 DIAGNOSIS — E78.2 MIXED HYPERLIPIDEMIA: ICD-10-CM

## 2022-08-03 DIAGNOSIS — Z00.00 PREVENTATIVE HEALTH CARE: ICD-10-CM

## 2022-08-03 DIAGNOSIS — R53.83 FATIGUE, UNSPECIFIED TYPE: ICD-10-CM

## 2022-08-03 DIAGNOSIS — Z12.4 CERVICAL CANCER SCREENING: ICD-10-CM

## 2022-08-03 DIAGNOSIS — E55.9 VITAMIN D DEFICIENCY: ICD-10-CM

## 2022-08-03 DIAGNOSIS — F33.9 RECURRENT MAJOR DEPRESSIVE EPISODES (H): ICD-10-CM

## 2022-08-03 DIAGNOSIS — Z51.81 ENCOUNTER FOR THERAPEUTIC DRUG MONITORING: Primary | ICD-10-CM

## 2022-08-03 DIAGNOSIS — E66.3 OVERWEIGHT (BMI 25.0-29.9): ICD-10-CM

## 2022-08-03 DIAGNOSIS — D64.9 ANEMIA, UNSPECIFIED TYPE: ICD-10-CM

## 2022-08-03 PROBLEM — F43.23 ADJUSTMENT DISORDER WITH MIXED ANXIETY AND DEPRESSED MOOD: Status: RESOLVED | Noted: 2019-12-31 | Resolved: 2022-08-03

## 2022-08-03 PROCEDURE — 99395 PREV VISIT EST AGE 18-39: CPT | Performed by: FAMILY MEDICINE

## 2022-08-03 PROCEDURE — 87624 HPV HI-RISK TYP POOLED RSLT: CPT | Performed by: FAMILY MEDICINE

## 2022-08-03 PROCEDURE — G0145 SCR C/V CYTO,THINLAYER,RESCR: HCPCS | Performed by: FAMILY MEDICINE

## 2022-08-03 PROCEDURE — 99213 OFFICE O/P EST LOW 20 MIN: CPT | Mod: 25 | Performed by: FAMILY MEDICINE

## 2022-08-03 ASSESSMENT — ENCOUNTER SYMPTOMS
JOINT SWELLING: 1
BREAST MASS: 1
ARTHRALGIAS: 1

## 2022-08-03 ASSESSMENT — ASTHMA QUESTIONNAIRES
ACT_TOTALSCORE: 24
QUESTION_2 LAST FOUR WEEKS HOW OFTEN HAVE YOU HAD SHORTNESS OF BREATH: NOT AT ALL
QUESTION_1 LAST FOUR WEEKS HOW MUCH OF THE TIME DID YOUR ASTHMA KEEP YOU FROM GETTING AS MUCH DONE AT WORK, SCHOOL OR AT HOME: NONE OF THE TIME
QUESTION_3 LAST FOUR WEEKS HOW OFTEN DID YOUR ASTHMA SYMPTOMS (WHEEZING, COUGHING, SHORTNESS OF BREATH, CHEST TIGHTNESS OR PAIN) WAKE YOU UP AT NIGHT OR EARLIER THAN USUAL IN THE MORNING: NOT AT ALL
QUESTION_4 LAST FOUR WEEKS HOW OFTEN HAVE YOU USED YOUR RESCUE INHALER OR NEBULIZER MEDICATION (SUCH AS ALBUTEROL): NOT AT ALL
QUESTION_5 LAST FOUR WEEKS HOW WOULD YOU RATE YOUR ASTHMA CONTROL: WELL CONTROLLED
ACT_TOTALSCORE: 24

## 2022-08-03 ASSESSMENT — PATIENT HEALTH QUESTIONNAIRE - PHQ9
10. IF YOU CHECKED OFF ANY PROBLEMS, HOW DIFFICULT HAVE THESE PROBLEMS MADE IT FOR YOU TO DO YOUR WORK, TAKE CARE OF THINGS AT HOME, OR GET ALONG WITH OTHER PEOPLE: NOT DIFFICULT AT ALL
SUM OF ALL RESPONSES TO PHQ QUESTIONS 1-9: 1
SUM OF ALL RESPONSES TO PHQ QUESTIONS 1-9: 1

## 2022-08-03 NOTE — LETTER
My Depression Action Plan  Name: Stephenie Menendez   Date of Birth 1985  Date: 8/3/2022    My doctor: Susy Matthews   My clinic: [address] [id number] [phone]          GREEN    ZONE   Good Control    What it looks like:     Things are going generally well. You have normal ups and downs. You may even feel depressed from time to time, but bad moods usually last less than a day.   What you need to do:  1. Continue to care for yourself (see self care plan)  2. Check your depression survival kit and update it as needed  3. Follow your physician s recommendations including any medication.  4. Do not stop taking medication unless you consult with your physician first.           YELLOW         ZONE Getting Worse    What it looks like:     Depression is starting to interfere with your life.     It may be hard to get out of bed; you may be starting to isolate yourself from others.    Symptoms of depression are starting to last most all day and this has happened for several days.     You may have suicidal thoughts but they are not constant.   What you need to do:     1. Call your care team. Your response to treatment will improve if you keep your care team informed of your progress. Yellow periods are signs an adjustment may need to be made.     2. Continue your self-care.  Just get dressed and ready for the day.  Don't give yourself time to talk yourself out of it.    3. Talk to someone in your support network.    4. Open up your Depression Self-Care Plan/Wellness Kit.           RED    ZONE Medical Alert - Get Help    What it looks like:     Depression is seriously interfering with your life.     You may experience these or other symptoms: You can t get out of bed most days, can t work or engage in other necessary activities, you have trouble taking care of basic hygiene, or basic responsibilities, thoughts of suicide or death that  will not go away, self-injurious behavior.     What you need to do:  1. Call your care team and request a same-day appointment. If they are not available (weekends or after hours) call your local crisis line, emergency room or 911.          Depression Self-Care Plan / Wellness Kit    Many people find that medication and therapy are helpful treatments for managing depression. In addition, making small changes to your everyday life can help to boost your mood and improve your wellbeing. Below are some tips for you to consider. Be sure to talk with your medical provider and/or behavioral health consultant if your symptoms are worsening or not improving.     Sleep   Sleep hygiene  means all of the habits that support good, restful sleep. It includes maintaining a consistent bedtime and wake time, using your bedroom only for sleeping or sex, and keeping the bedroom dark and free of distractions like a computer, smartphone, or television.     Develop a Healthy Routine  Maintain good hygiene. Get out of bed in the morning, make your bed, brush your teeth, take a shower, and get dressed. Don t spend too much time viewing media that makes you feel stressed. Find time to relax each day.    Exercise  Get some form of exercise every day. This will help reduce pain and release endorphins, the  feel good  chemicals in your brain. It can be as simple as just going for a walk or doing some gardening, anything that will get you moving.      Diet  Strive to eat healthy foods, including fruits and vegetables. Drink plenty of water. Avoid excessive sugar, caffeine, alcohol, and other mood-altering substances.     Stay Connected with Others  Stay in touch with friends and family members.    Manage Your Mood  Try deep breathing, massage therapy, biofeedback, or meditation. Take part in fun activities when you can. Try to find something to smile about each day.     Psychotherapy  Be open to working with a therapist if your provider  recommends it.     Medication  Be sure to take your medication as prescribed. Most anti-depressants need to be taken every day. It usually takes several weeks for medications to work. Not all medicines work for all people. It is important to follow-up with your provider to make sure you have a treatment plan that is working for you. Do not stop your medication abruptly without first discussing it with your provider.    Crisis Resources   These hotlines are for both adults and children. They and are open 24 hours a day, 7 days a week unless noted otherwise.      National Suicide Prevention Lifeline   988 or 7-449-748-SWIK (7678)      Crisis Text Line    www.crisistextline.org  Text HOME to 569054 from anywhere in the [place], anytime, about any type of crisis. A live, trained crisis counselor will receive the text and respond quickly.      [place] Lifeline for LGBTQ Youth  A national crisis intervention and suicide lifeline for LGBTQ youth under 25. Provides a safe place to talk without judgement. Call 1-405.277.9125; text START to 193777 or visit www.thetrevorproject.org to talk to a trained counselor.      For [facility] crisis numbers, visit the [facility] website at:  https://[place].gov/dhs/people-we-serve/adults/health-care/mental-health/resources/crisis-contacts.jsp

## 2022-08-03 NOTE — ASSESSMENT & PLAN NOTE
Normal brain mri done by neuro 10/2021 to rule out MS    Improving fatigue, thinks it is post covid.   Discussed sleep study, but she doesn't snore and doesn't suspect sleep apnea. So declined.

## 2022-08-03 NOTE — ASSESSMENT & PLAN NOTE
She says it is susan painful and feels it might be larger.   11/17/2021 breast lump is nothing worrisome. but - Follow-up ultrasound still needed from 10/7/2021 imaging. You will be due for this in October 2022.    Diagnostic mammo ordered.   consult requested for an expert opinion as she is saying her skin is itchy on the left breast too.

## 2022-08-03 NOTE — ASSESSMENT & PLAN NOTE
Contraception - female surgical  Covid, pneumo 20 vaccines recommended. She declined both today.  Flu shot - recommended in the fall.   Pap: due.   Mammo: see breast lump  Colonoscopy:  There is no family or personal history, not indicated     Std testing desired:  offered  Osteoporosis prevention discussed.  vitamin d levels ordered. Recommend daily calcium and vitamin d intake to keep good bone health. Recommend weight bearing exercise, no tobacco, and limit alcohol  dexa no indication.   Recommend sunscreen, exercise, & healthy diet.  Offered cbc, cmp, lipids and asked what other testing she  desires today  I have had an Advance Directives discussion with the patient.   Body mass index is 25.98 kg/m .   mychart offered.

## 2022-08-03 NOTE — PROGRESS NOTES
Breast lump and abnormal pap smear addressed above and beyond usual scope of annual exam.      SUBJECTIVE:   CC: Stephenie Menendez is an 36 year old woman who presents for preventive health visit.       Patient has been advised of split billing requirements and indicates understanding: Yes  Healthy Habits:     Getting at least 3 servings of Calcium per day:  Yes    Bi-annual eye exam:  Yes    Dental care twice a year:  NO    Sleep apnea or symptoms of sleep apnea:  Daytime drowsiness    Diet:  Gluten-free/reduced    Frequency of exercise:  2-3 days/week    Duration of exercise:  15-30 minutes    Taking medications regularly:  Yes    Medication side effects:  None    PHQ-2 Total Score: 0    Additional concerns today:  No    Today's PHQ-2 Score:   PHQ-2 ( 1999 [employer]) 8/3/2022   Q1: Little interest or pleasure in doing things 0   Q2: Feeling down, depressed or hopeless 0   PHQ-2 Score 0   Q1: Little interest or pleasure in doing things Not at all   Q2: Feeling down, depressed or hopeless Not at all   PHQ-2 Score 0       Abuse: Current or Past (Physical, Sexual or Emotional) - No  Do you feel safe in your environment? Yes        Social History     Tobacco Use     Smoking status: Never Smoker     Smokeless tobacco: Never Used   Substance Use Topics     Alcohol use: Yes     Comment: Alcoholic Drinks/day: socially          Alcohol Use 8/3/2022   Prescreen: >3 drinks/day or >7 drinks/week? No       Reviewed orders with patient.  Reviewed health maintenance and updated orders accordingly - Yes    Breast Cancer Screening:    FHS-7:   Breast CA Risk Assessment (FHS-7) 9/27/2021 8/3/2022   Did any of your first-degree relatives have breast or ovarian cancer? No No   Did any of your relatives have bilateral breast cancer? No No   Did any man in your family have breast cancer? No No   Did any woman in your family have breast and ovarian cancer? No No   Did any woman in your family have breast cancer before age 50 y? No No   Do  "you have 2 or more relatives with breast and/or ovarian cancer? No No   Do you have 2 or more relatives with breast and/or bowel cancer? No No       Pertinent mammograms are reviewed under the imaging tab.    History of abnormal Pap smear: YES - updated in Problem List and Health Maintenance accordingly  PAP / HPV Latest Ref Rng & Units 5/26/2021 11/28/2018   PAP Negative for squamous intraepithelial lesion or malignancy. Atypical squamous cells of undetermined significance  Electronically signed by Rolando Morse MD on 6/2/2021 at 10:24 AM  (A) Atypical squamous cells of undetermined significance  Electronically signed by Rolando Morse MD on 12/3/2018 at  4:29 PM  (A)   HPV16 NEG Negative Negative   HPV18 NEG Negative Negative   HRHPV NEG Negative Negative     Reviewed and updated as needed this visit by clinical staff   Tobacco  Allergies  Meds  Problems  Med Hx  Surg Hx  Fam Hx  Soc   Hx          Reviewed and updated as needed this visit by Provider      Problems               Review of Systems   Breasts:  Positive for tenderness, breast mass and discharge.   Genitourinary: Negative for pelvic pain, vaginal bleeding and vaginal discharge.   Musculoskeletal: Positive for arthralgias and joint swelling.          OBJECTIVE:   /80 (BP Location: Left arm, Patient Position: Sitting, Cuff Size: Adult Regular)   Pulse 90   Resp 14   Ht 1.702 m (5' 7\")   Wt 75.3 kg (165 lb 14.4 oz)   LMP 07/21/2022   SpO2 98%   BMI 25.98 kg/m    Physical Exam  Constitutional:       Appearance: Normal appearance.   HENT:      Head: Normocephalic and atraumatic.      Right Ear: Tympanic membrane, ear canal and external ear normal.      Left Ear: Tympanic membrane, ear canal and external ear normal.      Nose: Nose normal.      Mouth/Throat:      Mouth: Mucous membranes are moist.      Pharynx: Oropharynx is clear.   Eyes:      Extraocular Movements: Extraocular movements intact.      Conjunctiva/sclera: " Conjunctivae normal.      Pupils: Pupils are equal, round, and reactive to light.   Cardiovascular:      Rate and Rhythm: Normal rate and regular rhythm.      Heart sounds: Normal heart sounds.   Pulmonary:      Effort: Pulmonary effort is normal.      Breath sounds: Normal breath sounds.   Chest:   Breasts: Breasts are symmetrical.      Right: Normal. No swelling, bleeding, inverted nipple, mass, nipple discharge, skin change or tenderness.      Left: Normal. No swelling, bleeding, inverted nipple, mass, nipple discharge, skin change or tenderness.            Comments: 1cm spherical mass palpable at the 5 oclock position, see diagram, tender to touch.  Abdominal:      General: Bowel sounds are normal.      Palpations: Abdomen is soft.   Genitourinary:     General: Normal vulva.      Exam position: Lithotomy position.      Vagina: Normal.      Cervix: Normal.      Uterus: Normal.       Adnexa: Right adnexa normal and left adnexa normal.      Rectum: Normal.   Musculoskeletal:         General: Normal range of motion.      Cervical back: Normal range of motion and neck supple.   Skin:     General: Skin is warm and dry.      Capillary Refill: Capillary refill takes less than 2 seconds.   Neurological:      General: No focal deficit present.      Mental Status: She is alert and oriented to person, place, and time.   Psychiatric:         Mood and Affect: Mood normal.         Behavior: Behavior normal.         Thought Content: Thought content normal.         Judgment: Judgment normal.           ASSESSMENT/PLAN:     Problem List Items Addressed This Visit        Digestive    Vitamin D deficiency     We will recheck vitamin D level and titrate supplement as needed.           Relevant Orders    Vitamin D Deficiency       Endocrine    Mixed hyperlipidemia     Continue to check annually.           Relevant Orders    Lipid Profile       Hematologic    Anemia     Recheck CBC to monitor.           Relevant Orders    CBC with  platelets       Behavioral    RESOLVED: Recurrent major depressive episodes (H)     Resolved per patient.               Other    Preventative health care     Contraception - female surgical  Covid, pneumo 20 vaccines recommended. She declined both today.  Flu shot - recommended in the fall.   Pap: due.   Mammo: see breast lump  Colonoscopy:  There is no family or personal history, not indicated     Std testing desired:  offered  Osteoporosis prevention discussed.  vitamin d levels ordered. Recommend daily calcium and vitamin d intake to keep good bone health. Recommend weight bearing exercise, no tobacco, and limit alcohol  dexa no indication.   Recommend sunscreen, exercise, & healthy diet.  Offered cbc, cmp, lipids and asked what other testing she  desires today  I have had an Advance Directives discussion with the patient.   Body mass index is 25.98 kg/m .   mychart offered.             Overweight (BMI 25.0-29.9)     Stable to improved           Fatigue     Normal brain mri done by neuro 10/2021 to rule out MS    Improving fatigue, thinks it is post covid.   Discussed sleep study, but she doesn't snore and doesn't suspect sleep apnea. So declined.            Breast lump on right side at 10 o'clock position     She says it is susan painful and feels it might be larger.   11/17/2021 breast lump is nothing worrisome. but - Follow-up ultrasound still needed from 10/7/2021 imaging. You will be due for this in October 2022.    Diagnostic mammo ordered.   consult requested for an expert opinion as she is saying her skin is itchy on the left breast too.            Relevant Orders    MA Diagnostic Digital Bilateral    Adult General Surg Referral      Other Visit Diagnoses     Encounter for therapeutic drug monitoring    -  Primary    Relevant Orders    Comprehensive metabolic panel (BMP + Alb, Alk Phos, ALT, AST, Total. Bili, TP)    Screening for HIV (human immunodeficiency virus)        Cervical cancer screening  "       Relevant Orders    Pap Screen with HPV - recommended age 30 - 65 years          Patient has been advised of split billing requirements and indicates understanding: Yes    COUNSELING:  Reviewed preventive health counseling, as reflected in patient instructions       Regular exercise       Healthy diet/nutrition       HIV screeninx in teen years, 1x in adult years, and at intervals if high risk       Advance Care Planning    Estimated body mass index is 25.98 kg/m  as calculated from the following:    Height as of this encounter: 1.702 m (5' 7\").    Weight as of this encounter: 75.3 kg (165 lb 14.4 oz).    Weight management plan: Discussed healthy diet and exercise guidelines    She reports that she has never smoked. She has never used smokeless tobacco.      Susy Matthews MD  [facility]  Answers for HPI/ROS submitted by the patient on 8/3/2022  If you checked off any problems, how difficult have these problems made it for you to do your work, take care of things at home, or get along with other people?: Not difficult at all  PHQ9 TOTAL SCORE: 1      "

## 2022-08-09 ENCOUNTER — LAB (OUTPATIENT)
Dept: LAB | Facility: CLINIC | Age: 37
End: 2022-08-09
Payer: COMMERCIAL

## 2022-08-09 DIAGNOSIS — D64.9 ANEMIA, UNSPECIFIED TYPE: ICD-10-CM

## 2022-08-09 DIAGNOSIS — E78.2 MIXED HYPERLIPIDEMIA: ICD-10-CM

## 2022-08-09 DIAGNOSIS — E55.9 VITAMIN D DEFICIENCY: ICD-10-CM

## 2022-08-09 DIAGNOSIS — Z51.81 ENCOUNTER FOR THERAPEUTIC DRUG MONITORING: ICD-10-CM

## 2022-08-09 LAB
ALBUMIN SERPL BCG-MCNC: 4.6 G/DL (ref 3.5–5.2)
ALP SERPL-CCNC: 47 U/L (ref 35–104)
ALT SERPL W P-5'-P-CCNC: 18 U/L (ref 10–35)
ANION GAP SERPL CALCULATED.3IONS-SCNC: 11 MMOL/L (ref 7–15)
AST SERPL W P-5'-P-CCNC: 18 U/L (ref 10–35)
BILIRUB SERPL-MCNC: 0.4 MG/DL
BKR LAB AP GYN ADEQUACY: NORMAL
BKR LAB AP GYN INTERPRETATION: NORMAL
BKR LAB AP HPV REFLEX: NORMAL
BKR LAB AP PREVIOUS ABNORMAL: NORMAL
BUN SERPL-MCNC: 21.7 MG/DL (ref 6–20)
CALCIUM SERPL-MCNC: 9.3 MG/DL (ref 8.6–10)
CHLORIDE SERPL-SCNC: 102 MMOL/L (ref 98–107)
CHOLEST SERPL-MCNC: 228 MG/DL
CREAT SERPL-MCNC: 0.81 MG/DL (ref 0.51–0.95)
DEPRECATED HCO3 PLAS-SCNC: 23 MMOL/L (ref 22–29)
ERYTHROCYTE [DISTWIDTH] IN BLOOD BY AUTOMATED COUNT: 14.7 % (ref 10–15)
GFR SERPL CREATININE-BSD FRML MDRD: >90 ML/MIN/1.73M2
GLUCOSE SERPL-MCNC: 91 MG/DL (ref 70–99)
HCT VFR BLD AUTO: 35.6 % (ref 35–47)
HDLC SERPL-MCNC: 68 MG/DL
HGB BLD-MCNC: 11.5 G/DL (ref 11.7–15.7)
LDLC SERPL CALC-MCNC: 141 MG/DL
MCH RBC QN AUTO: 25.3 PG (ref 26.5–33)
MCHC RBC AUTO-ENTMCNC: 32.3 G/DL (ref 31.5–36.5)
MCV RBC AUTO: 78 FL (ref 78–100)
NONHDLC SERPL-MCNC: 160 MG/DL
PATH REPORT.COMMENTS IMP SPEC: NORMAL
PATH REPORT.COMMENTS IMP SPEC: NORMAL
PATH REPORT.RELEVANT HX SPEC: NORMAL
PLATELET # BLD AUTO: 246 10E3/UL (ref 150–450)
POTASSIUM SERPL-SCNC: 4.2 MMOL/L (ref 3.4–5.3)
PROT SERPL-MCNC: 6.7 G/DL (ref 6.4–8.3)
RBC # BLD AUTO: 4.55 10E6/UL (ref 3.8–5.2)
SODIUM SERPL-SCNC: 136 MMOL/L (ref 136–145)
TRIGL SERPL-MCNC: 93 MG/DL
WBC # BLD AUTO: 4.3 10E3/UL (ref 4–11)

## 2022-08-09 PROCEDURE — 80061 LIPID PANEL: CPT

## 2022-08-09 PROCEDURE — 36415 COLL VENOUS BLD VENIPUNCTURE: CPT

## 2022-08-09 PROCEDURE — 80053 COMPREHEN METABOLIC PANEL: CPT

## 2022-08-09 PROCEDURE — 85027 COMPLETE CBC AUTOMATED: CPT

## 2022-08-09 PROCEDURE — 82306 VITAMIN D 25 HYDROXY: CPT

## 2022-08-10 LAB — DEPRECATED CALCIDIOL+CALCIFEROL SERPL-MC: 26 UG/L (ref 20–75)

## 2022-08-11 ENCOUNTER — HOSPITAL ENCOUNTER (OUTPATIENT)
Dept: MAMMOGRAPHY | Facility: CLINIC | Age: 37
Discharge: HOME | End: 2022-08-11
Attending: FAMILY MEDICINE | Admitting: FAMILY MEDICINE
Payer: COMMERCIAL

## 2022-08-11 DIAGNOSIS — N63.11 BREAST LUMP ON RIGHT SIDE AT 10 O'CLOCK POSITION: ICD-10-CM

## 2022-08-11 PROCEDURE — 76642 ULTRASOUND BREAST LIMITED: CPT | Mod: LT

## 2022-08-12 ENCOUNTER — PATIENT OUTREACH (OUTPATIENT)
Dept: FAMILY MEDICINE | Facility: CLINIC | Age: 37
End: 2022-08-12

## 2022-08-12 LAB
HUMAN PAPILLOMA VIRUS 16 DNA: NEGATIVE
HUMAN PAPILLOMA VIRUS 18 DNA: NEGATIVE
HUMAN PAPILLOMA VIRUS FINAL DIAGNOSIS: NORMAL
HUMAN PAPILLOMA VIRUS OTHER HR: NEGATIVE

## 2022-08-29 DIAGNOSIS — M54.2 CHRONIC NECK PAIN: ICD-10-CM

## 2022-08-29 DIAGNOSIS — M70.62 TROCHANTERIC BURSITIS OF BOTH HIPS: ICD-10-CM

## 2022-08-29 DIAGNOSIS — G89.29 CHRONIC NECK PAIN: ICD-10-CM

## 2022-08-29 DIAGNOSIS — M25.562 CHRONIC PAIN OF BOTH KNEES: ICD-10-CM

## 2022-08-29 DIAGNOSIS — G89.29 CHRONIC UPPER BACK PAIN: ICD-10-CM

## 2022-08-29 DIAGNOSIS — G89.29 CHRONIC PAIN OF BOTH KNEES: ICD-10-CM

## 2022-08-29 DIAGNOSIS — G89.29 CHRONIC PAIN OF MULTIPLE JOINTS: ICD-10-CM

## 2022-08-29 DIAGNOSIS — M54.9 CHRONIC UPPER BACK PAIN: ICD-10-CM

## 2022-08-29 DIAGNOSIS — M79.7 FIBROMYALGIA: ICD-10-CM

## 2022-08-29 DIAGNOSIS — M25.50 CHRONIC PAIN OF MULTIPLE JOINTS: ICD-10-CM

## 2022-08-29 DIAGNOSIS — M25.561 CHRONIC PAIN OF BOTH KNEES: ICD-10-CM

## 2022-08-29 DIAGNOSIS — M70.61 TROCHANTERIC BURSITIS OF BOTH HIPS: ICD-10-CM

## 2022-08-29 RX ORDER — CYCLOBENZAPRINE HCL 10 MG
TABLET ORAL
Qty: 90 TABLET | Refills: 1 | Status: CANCELLED | OUTPATIENT
Start: 2022-08-29

## 2022-08-29 RX ORDER — MELOXICAM 7.5 MG/1
TABLET ORAL
Qty: 180 TABLET | Refills: 1 | Status: CANCELLED | OUTPATIENT
Start: 2022-08-29

## 2022-08-29 NOTE — TELEPHONE ENCOUNTER
Refill request from [id number]    Medication: Meloxicam 7.5  Cyclobenzaprine 10mg    No future appt with new Rheumatologist

## 2022-08-29 NOTE — TELEPHONE ENCOUNTER
LMTCB x1    Patient seen Dr. Carlton for fibromyalgia and will have to establish with a rheumatologist that sees for fibromyalgia or have her PCP take over care.

## 2022-08-29 NOTE — TELEPHONE ENCOUNTER
Please call patient to schedule appointment to establish with new Rheum provider for medication refills.    Pt should request refill from PCP to manage care and treatment if desired.

## 2022-10-10 ENCOUNTER — APPOINTMENT (OUTPATIENT)
Dept: CT IMAGING | Facility: CLINIC | Age: 37
End: 2022-10-10
Attending: EMERGENCY MEDICINE
Payer: COMMERCIAL

## 2022-10-10 ENCOUNTER — HOSPITAL ENCOUNTER (EMERGENCY)
Facility: CLINIC | Age: 37
Discharge: HOME | End: 2022-10-10
Attending: EMERGENCY MEDICINE | Admitting: EMERGENCY MEDICINE
Payer: COMMERCIAL

## 2022-10-10 ENCOUNTER — APPOINTMENT (OUTPATIENT)
Dept: ULTRASOUND IMAGING | Facility: CLINIC | Age: 37
End: 2022-10-10
Attending: EMERGENCY MEDICINE
Payer: COMMERCIAL

## 2022-10-10 VITALS
DIASTOLIC BLOOD PRESSURE: 67 MMHG | TEMPERATURE: 98.1 F | HEART RATE: 68 BPM | RESPIRATION RATE: 16 BRPM | SYSTOLIC BLOOD PRESSURE: 124 MMHG | WEIGHT: 160 LBS | BODY MASS INDEX: 25.11 KG/M2 | HEIGHT: 67 IN | OXYGEN SATURATION: 99 %

## 2022-10-10 DIAGNOSIS — N13.4 HYDROURETER: ICD-10-CM

## 2022-10-10 DIAGNOSIS — R10.9 RIGHT FLANK PAIN: ICD-10-CM

## 2022-10-10 DIAGNOSIS — N84.0 ENDOMETRIAL POLYP: ICD-10-CM

## 2022-10-10 DIAGNOSIS — R79.89 ELEVATED LFTS: ICD-10-CM

## 2022-10-10 LAB
ALBUMIN SERPL-MCNC: 4.2 G/DL (ref 3.5–5)
ALBUMIN UR-MCNC: NEGATIVE MG/DL
ALP SERPL-CCNC: 75 U/L (ref 45–120)
ALT SERPL W P-5'-P-CCNC: 75 U/L (ref 0–45)
ANION GAP SERPL CALCULATED.3IONS-SCNC: 7 MMOL/L (ref 5–18)
APPEARANCE UR: CLEAR
AST SERPL W P-5'-P-CCNC: 164 U/L (ref 0–40)
BILIRUB SERPL-MCNC: 0.4 MG/DL (ref 0–1)
BILIRUB UR QL STRIP: NEGATIVE
BUN SERPL-MCNC: 13 MG/DL (ref 8–22)
CALCIUM SERPL-MCNC: 8.7 MG/DL (ref 8.5–10.5)
CHLORIDE BLD-SCNC: 105 MMOL/L (ref 98–107)
CO2 SERPL-SCNC: 27 MMOL/L (ref 22–31)
COLOR UR AUTO: ABNORMAL
CREAT SERPL-MCNC: 0.8 MG/DL (ref 0.6–1.1)
ERYTHROCYTE [DISTWIDTH] IN BLOOD BY AUTOMATED COUNT: 14.5 % (ref 10–15)
GFR SERPL CREATININE-BSD FRML MDRD: >90 ML/MIN/1.73M2
GLUCOSE BLD-MCNC: 90 MG/DL (ref 70–125)
GLUCOSE UR STRIP-MCNC: NEGATIVE MG/DL
HCG SERPL QL: NEGATIVE
HCT VFR BLD AUTO: 34.9 % (ref 35–47)
HGB BLD-MCNC: 11 G/DL (ref 11.7–15.7)
HGB UR QL STRIP: NEGATIVE
KETONES UR STRIP-MCNC: ABNORMAL MG/DL
LEUKOCYTE ESTERASE UR QL STRIP: NEGATIVE
LIPASE SERPL-CCNC: 12 U/L (ref 0–52)
MCH RBC QN AUTO: 25.2 PG (ref 26.5–33)
MCHC RBC AUTO-ENTMCNC: 31.5 G/DL (ref 31.5–36.5)
MCV RBC AUTO: 80 FL (ref 78–100)
MUCOUS THREADS #/AREA URNS LPF: PRESENT /LPF
NITRATE UR QL: NEGATIVE
PH UR STRIP: 6 [PH] (ref 5–7)
PLATELET # BLD AUTO: 264 10E3/UL (ref 150–450)
POTASSIUM BLD-SCNC: 4.1 MMOL/L (ref 3.5–5)
PROT SERPL-MCNC: 6.9 G/DL (ref 6–8)
RBC # BLD AUTO: 4.36 10E6/UL (ref 3.8–5.2)
RBC URINE: 1 /HPF
SODIUM SERPL-SCNC: 139 MMOL/L (ref 136–145)
SP GR UR STRIP: 1.03 (ref 1–1.03)
SQUAMOUS EPITHELIAL: 2 /HPF
UROBILINOGEN UR STRIP-MCNC: <2 MG/DL
WBC # BLD AUTO: 9.3 10E3/UL (ref 4–11)
WBC URINE: 1 /HPF

## 2022-10-10 PROCEDURE — 84703 CHORIONIC GONADOTROPIN ASSAY: CPT | Performed by: EMERGENCY MEDICINE

## 2022-10-10 PROCEDURE — 250N000011 HC RX IP 250 OP 636: Performed by: EMERGENCY MEDICINE

## 2022-10-10 PROCEDURE — 76830 TRANSVAGINAL US NON-OB: CPT

## 2022-10-10 PROCEDURE — 83690 ASSAY OF LIPASE: CPT | Performed by: EMERGENCY MEDICINE

## 2022-10-10 PROCEDURE — 96361 HYDRATE IV INFUSION ADD-ON: CPT

## 2022-10-10 PROCEDURE — 81001 URINALYSIS AUTO W/SCOPE: CPT | Performed by: EMERGENCY MEDICINE

## 2022-10-10 PROCEDURE — 80053 COMPREHEN METABOLIC PANEL: CPT | Performed by: EMERGENCY MEDICINE

## 2022-10-10 PROCEDURE — 96374 THER/PROPH/DIAG INJ IV PUSH: CPT | Mod: 59

## 2022-10-10 PROCEDURE — 36415 COLL VENOUS BLD VENIPUNCTURE: CPT | Performed by: EMERGENCY MEDICINE

## 2022-10-10 PROCEDURE — 85027 COMPLETE CBC AUTOMATED: CPT | Performed by: EMERGENCY MEDICINE

## 2022-10-10 PROCEDURE — 258N000003 HC RX IP 258 OP 636: Performed by: EMERGENCY MEDICINE

## 2022-10-10 PROCEDURE — 74177 CT ABD & PELVIS W/CONTRAST: CPT

## 2022-10-10 PROCEDURE — 99285 EMERGENCY DEPT VISIT HI MDM: CPT | Mod: 25

## 2022-10-10 PROCEDURE — 96375 TX/PRO/DX INJ NEW DRUG ADDON: CPT

## 2022-10-10 RX ORDER — DIMENHYDRINATE 50 MG
50 TABLET ORAL AT BEDTIME
Qty: 7 TABLET | Refills: 0 | Status: SHIPPED | OUTPATIENT
Start: 2022-10-10 | End: 2022-10-17

## 2022-10-10 RX ORDER — OXYCODONE HYDROCHLORIDE 5 MG/1
5 TABLET ORAL EVERY 4 HOURS PRN
Qty: 6 TABLET | Refills: 0 | Status: SHIPPED | OUTPATIENT
Start: 2022-10-10 | End: 2022-10-14

## 2022-10-10 RX ORDER — KETOROLAC TROMETHAMINE 15 MG/ML
15 INJECTION, SOLUTION INTRAMUSCULAR; INTRAVENOUS ONCE
Status: COMPLETED | OUTPATIENT
Start: 2022-10-10 | End: 2022-10-10

## 2022-10-10 RX ORDER — DIMENHYDRINATE 50 MG
50 TABLET ORAL EVERY 6 HOURS PRN
Qty: 28 TABLET | Refills: 0 | Status: SHIPPED | OUTPATIENT
Start: 2022-10-10 | End: 2022-10-17

## 2022-10-10 RX ORDER — HYDROMORPHONE HYDROCHLORIDE 1 MG/ML
0.5 INJECTION, SOLUTION INTRAMUSCULAR; INTRAVENOUS; SUBCUTANEOUS ONCE
Status: COMPLETED | OUTPATIENT
Start: 2022-10-10 | End: 2022-10-10

## 2022-10-10 RX ORDER — IOPAMIDOL 755 MG/ML
100 INJECTION, SOLUTION INTRAVASCULAR ONCE
Status: COMPLETED | OUTPATIENT
Start: 2022-10-10 | End: 2022-10-10

## 2022-10-10 RX ORDER — IBUPROFEN 200 MG
400 TABLET ORAL EVERY 6 HOURS
Qty: 56 TABLET | Refills: 0 | Status: SHIPPED | OUTPATIENT
Start: 2022-10-10 | End: 2022-10-17

## 2022-10-10 RX ORDER — ACETAMINOPHEN 500 MG
1000 TABLET ORAL EVERY 6 HOURS
Qty: 56 TABLET | Refills: 0 | Status: SHIPPED | OUTPATIENT
Start: 2022-10-10 | End: 2022-10-17

## 2022-10-10 RX ADMIN — SODIUM CHLORIDE 1000 ML: 9 INJECTION, SOLUTION INTRAVENOUS at 10:20

## 2022-10-10 RX ADMIN — IOPAMIDOL 100 ML: 755 INJECTION, SOLUTION INTRAVENOUS at 11:28

## 2022-10-10 RX ADMIN — KETOROLAC TROMETHAMINE 15 MG: 15 INJECTION, SOLUTION INTRAMUSCULAR; INTRAVENOUS at 12:02

## 2022-10-10 RX ADMIN — HYDROMORPHONE HYDROCHLORIDE 0.5 MG: 1 INJECTION, SOLUTION INTRAMUSCULAR; INTRAVENOUS; SUBCUTANEOUS at 10:20

## 2022-10-10 ASSESSMENT — ENCOUNTER SYMPTOMS
CONFUSION: 0
ABDOMINAL PAIN: 1
DYSURIA: 0
FREQUENCY: 0
HEMATURIA: 0
COUGH: 1
DIFFICULTY URINATING: 0
FEVER: 0
BLOOD IN STOOL: 0
HEADACHES: 0
BACK PAIN: 0
CONSTIPATION: 0
DIARRHEA: 0
ADENOPATHY: 0

## 2022-10-10 ASSESSMENT — ACTIVITIES OF DAILY LIVING (ADL)
ADLS_ACUITY_SCORE: 35
ADLS_ACUITY_SCORE: 35

## 2022-10-10 NOTE — ED NOTES
Bed: [facility]-04  Expected date:   Expected time:   Means of arrival:   Comments:  Isabel-36 y.o. F-Right abdominal pain, given 100mcq Fentanyl

## 2022-10-10 NOTE — ED PROVIDER NOTES
EMERGENCY DEPARTMENT ENCOUNTER      NAME: Stephenie Menendez  AGE: 36 year old female  YOB: 1985  MRN: [id number]  EVALUATION DATE & TIME: 10/10/2022  9:40 AM    PCP: Susy Matthews    ED PROVIDER: Chevy Almazan MD        Chief Complaint   Patient presents with     Abdominal Pain         FINAL IMPRESSION:  1. Hydroureter    2. Right flank pain    3. Elevated LFTs    4. Endometrial polyp          ED COURSE & MEDICAL DECISION MAKING:    Pertinent Labs & Imaging studies reviewed. (See chart for details)  36 year old female presents to the Emergency Department for evaluation of abrupt onset right flank pain.  Tenderness to right upper quadrant as well as right lower quadrant.  No rash.    Differential includes biliary colic, appendicitis, renal colic, ovarian torsion, ovarian cyst, urinary tract infection.       Pulm emboli unlikely    Plan for pain medication, CT imaging    Patient is feeling better with pain medication.  CT shows moderate hydroureter to the mid ureter.    Ultrasound shows endometrial polyps unlikely to cause the patient's symptoms.  No ovarian torsion ovarian cyst    Ectopic pregnancy unlikely    UTI unlikely    LFT suggestive of alcohol hepatitis.  Discussed with patient recommend she cut back on her drinking and recommend repeat LFTs in 1 week    Will send home with pain medications for possible passed kidney stone and mild hydroureter    UA not suggestive of UTI    Possibly recently passed kidney stone as cause her symptoms otherwise hydroureter is likely the cause.  I want patient to follow-up with her primary care doctor for recheck      9:58 AM The medical student met with the patient.   10:04 AM I was updated by the medical student.   10:06 AM I met with the patient and performed my initial exam.  I discussed the plan for care and the patient is agreeable with this plan. PPE: Provider wore gloves, N95 mask, eye protection.    12:09 PM The medical student updated the patient.    1:28 PM The medical student rechecked and updated the patient. Pain improved.    1:36 PM I rechecked and updated the patient. I discussed the plan for discharge with the patient, and patient is agreeable. We discussed supportivecares at home and reasons for return to the ER including new or worsening symptoms - all questions and concerns addressed. Patient to be discharged by RN.   ED Course as of 10/10/22 1441   Mon Oct 10, 2022   1440 HCG Qualitative Serum: Negative       At the conclusion of the encounter I discussed the results of all of the tests and the disposition. The questions were answered. The patient or family acknowledged understanding and was agreeable with the care plan.       MEDICATIONS GIVEN IN THE EMERGENCY:  Medications   0.9% sodium chloride BOLUS (0 mLs Intravenous Stopped 10/10/22 1329)   HYDROmorphone (PF) (DILAUDID) injection 0.5 mg (0.5 mg Intravenous Given 10/10/22 1020)   iopamidol (ISOVUE-370) solution 100 mL (100 mLs Intravenous Given 10/10/22 1128)   ketorolac (TORADOL) injection 15 mg (15 mg Intravenous Given 10/10/22 1202)       NEW PRESCRIPTIONS STARTED AT TODAY'S ER VISIT  Discharge Medication List as of 10/10/2022  1:58 PM      START taking these medications    Details   !! dimenhyDRINATE (DRAMAMINE) 50 MG tablet Take 1 tablet (50 mg) by mouth At Bedtime for 7 days, Disp-7 tablet, R-0, Local Print      !! dimenhyDRINATE (DRAMAMINE) 50 MG tablet Take 1 tablet (50 mg) by mouth every 6 hours as needed for other (kidney stone pain management), Disp-28 tablet, R-0, Local Print      ibuprofen (ADVIL/MOTRIN) 200 MG tablet Take 2 tablets (400 mg) by mouth every 6 hours for 7 days, Disp-56 tablet, R-0, Local Print      oxyCODONE (ROXICODONE) 5 MG tablet Take 1 tablet (5 mg) by mouth every 4 hours as needed for severe pain If pain is not improved with acetaminophen and ibuprofen., Disp-6 tablet, R-0, Local Print       !! - Potential duplicate medications found. Please discuss with  "provider.             =================================================================    HPI    Triage note  \" \"      Patient information was obtained from: the patient    Use of : N/A      Stephenie Menendez is a 36 year old female with a pertinent history of anemia, vidD deficiency, ASCUS of cervic, COVID-19, overweight who presents to this ED by EMS for evaluation of abdominal pain.     The patient presents to the ED reporting she woke this AM with sudden right abdominal pain which has been progressing in severity. She has taken tylenol without relief, improved after receiving fentanyl from EMS.     She does note she still has a residual cough from a cold from several weeks ago.      The patient denies urinary symptoms, changes in bowel, and any other symptoms or complaints at this time.     MHx: Last period was last week. Mother with history of gallstones.   SHx: Sexually active. 1-2 glasses of wine a day.       REVIEW OF SYSTEMS   Review of Systems   Constitutional: Negative for fever.   HENT: Negative for drooling.    Respiratory: Positive for cough (residual).    Cardiovascular: Negative for chest pain.   Gastrointestinal: Positive for abdominal pain (right). Negative for blood in stool, constipation and diarrhea.   Genitourinary: Negative for decreased urine volume, difficulty urinating, dysuria, enuresis, frequency, hematuria and urgency.   Musculoskeletal: Negative for back pain.   Skin: Negative for rash.   Neurological: Negative for headaches.   Hematological: Negative for adenopathy.   Psychiatric/Behavioral: Negative for confusion.     PAST MEDICAL HISTORY:  Past Medical History:   Diagnosis Date     Acute antritis 2/17/2016    Formatting of this note might be different from the original. Overview:  Recommend over-the-counter Flonase and sinus nasal rinsing with Ocean Spray or simply saline and return to clinic if not improved in the next 2 weeks.  Didn't work worsened. - try augmentin  " Last Assessment & Plan:  Recommend over-the-counter Flonase and sinus nasal rinsing with Ocean Spray or simply saline did not help.  And     Adjustment disorder with mixed anxiety and depressed mood 2019     Anemia      Arthritis      Breast asymmetry in female 2018     Depression      Depressive disorder      Fibroadenoma of breast, left 9/15/2021     Leukopenia 2018     Major depressive disorder, recurrent episode (H)     celexa 10 mg po q day   Replacement Utility updated for latest IMO load     Recurrent major depressive episodes (H) 2017    celexa 10 mg po q day Replacement Utility updated for latest IMO load   Formatting of this note might be different from the original. Overview:  celexa 10 mg po q day  Replacement Utility updated for latest IMO load  Last Assessment & Plan:  She was on Celexa from April through July and then self discontinued it.  This is about the same time when she started to feel fatigued.  I suggested restarti       PAST SURGICAL HISTORY:  Past Surgical History:   Procedure Laterality Date     TUBAL LIGATION       ZZC  DELIVERY ONLY      Description:  Section;  Recorded: 2014;           CURRENT MEDICATIONS:    acetaminophen (TYLENOL) 500 MG tablet  dimenhyDRINATE (DRAMAMINE) 50 MG tablet  dimenhyDRINATE (DRAMAMINE) 50 MG tablet  ibuprofen (ADVIL/MOTRIN) 200 MG tablet  oxyCODONE (ROXICODONE) 5 MG tablet  cyclobenzaprine (FLEXERIL) 10 MG tablet  levalbuterol (XOPENEX HFA) 45 MCG/ACT inhaler  meloxicam (MOBIC) 7.5 MG tablet  tiZANidine (ZANAFLEX) 2 MG tablet        ALLERGIES:  Allergies   Allergen Reactions     Cymbalta Difficulty breathing and Rash     Duloxetine Rash and Shortness Of Breath     THROAT CONSTRICTION     Imitrex [Sumatriptan]      Respiratory distress.       FAMILY HISTORY:  Family History   Problem Relation Age of Onset     Hypertension Mother      Hyperlipidemia Mother      Hyperthyroidism Mother         benign mass removed.      "Hypertension Father      Prostate Cancer Father      Hyperlipidemia Father      Anxiety Disorder Father      Depression Brother      Anxiety Disorder Brother      Autism Spectrum Disorder Daughter      Attention Deficit Disorder Son      Autism Spectrum Disorder Son      Diabetes Maternal Grandmother      Depression Maternal Grandmother      Heart Disease Maternal Grandmother      Hypertension Maternal Grandmother      Colon Cancer Maternal Grandmother      Lung Cancer Maternal Grandmother      Goiter Maternal Grandmother      Hypertension Maternal Grandfather      Hypertension Paternal Grandmother      Cancer Paternal Grandmother         stomach     Hypertension Paternal Grandfather      Breast Cancer No family hx of        SOCIAL HISTORY:   Social History     Socioeconomic History     Marital status:    Tobacco Use     Smoking status: Never     Smokeless tobacco: Never   Vaping Use     Vaping Use: Never used   Substance and Sexual Activity     Alcohol use: Yes     Comment: Alcoholic Drinks/day: socially      Drug use: No     Sexual activity: Yes     Partners: Male     Birth control/protection: Female Surgical   Other Topics Concern     Parent/sibling w/ CABG, MI or angioplasty before 65F 55M? No   Social History Narrative    5/26/2021 lives with  and kids age 14 and twins age 13 in July. 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 Does woodworking, sewing, baking.        VITALS:  /67   Pulse 68   Temp 98.1  F (36.7  C) (Oral)   Resp 16   Ht 1.702 m (5' 7\")   Wt 72.6 kg (160 lb)   LMP 10/06/2022   SpO2 99%   BMI 25.06 kg/m      PHYSICAL EXAM      Vitals: /67   Pulse 68   Temp 98.1  F (36.7  C) (Oral)   Resp 16   Ht 1.702 m (5' 7\")   Wt 72.6 kg (160 lb)   LMP 10/06/2022   SpO2 99%   BMI 25.06 kg/m    General: Appears in no acute distress, awake, alert, interactive.  Eyes: Conjunctivae non-injected. Sclera anicteric.  HENT: Atraumatic.  Neck: Supple.  Respiratory/Chest: " Respiration unlabored.  Abdomen: non distended. Right upper quadrant most TTP with some right lower quadrant tenderness.   Musculoskeletal: Normal extremities. No edema or erythema.  Skin: Normal color. No rash or diaphoresis.  Neurologic: Face symmetric, moves all extremities spontaneously. Speech clear.  Psychiatric: Oriented to person, place, and time. Affect appropriate.       LAB:  All pertinent labs reviewed and interpreted.  Results for orders placed or performed during the hospital encounter of 10/10/22   CT Abdomen Pelvis w Contrast    Impression    IMPRESSION:   1.  Mild right hydroureter may be transient and related to mass effect from adjacent structures. No ureteral calculi.  2.  Low-attenuation right and left adnexal areas are likely follicles. Cannot assess for hydrosalpinx on this study. Recommend ultrasound if there is concern for an adnexal process.   3.  Trace free fluid in the pelvis is likely physiologic.   US Pelvis Cmplt w Transvag & Doppler LmtPel Duplex Limited    Impression    IMPRESSION:    1.  Trace pelvic free fluid, likely physiologic. No other findings identified to explain patient's symptoms.  2.  Couple of rounded echogenic structures involving the endometrium with the largest measuring 6 mm. These could represent endometrial polyps. A saline sonohysterogram or MRI could be performed for clarification if warranted.         CBC (+ platelets, no diff)   Result Value Ref Range    WBC Count 9.3 4.0 - 11.0 10e3/uL    RBC Count 4.36 3.80 - 5.20 10e6/uL    Hemoglobin 11.0 (L) 11.7 - 15.7 g/dL    Hematocrit 34.9 (L) 35.0 - 47.0 %    MCV 80 78 - 100 fL    MCH 25.2 (L) 26.5 - 33.0 pg    MCHC 31.5 31.5 - 36.5 g/dL    RDW 14.5 10.0 - 15.0 %    Platelet Count 264 150 - 450 10e3/uL   Comprehensive metabolic panel   Result Value Ref Range    Sodium 139 136 - 145 mmol/L    Potassium 4.1 3.5 - 5.0 mmol/L    Chloride 105 98 - 107 mmol/L    Carbon Dioxide (CO2) 27 22 - 31 mmol/L    Anion Gap 7 5 - 18  mmol/L    Urea Nitrogen 13 8 - 22 mg/dL    Creatinine 0.80 0.60 - 1.10 mg/dL    Calcium 8.7 8.5 - 10.5 mg/dL    Glucose 90 70 - 125 mg/dL    Alkaline Phosphatase 75 45 - 120 U/L     (H) 0 - 40 U/L    ALT 75 (H) 0 - 45 U/L    Protein Total 6.9 6.0 - 8.0 g/dL    Albumin 4.2 3.5 - 5.0 g/dL    Bilirubin Total 0.4 0.0 - 1.0 mg/dL    GFR Estimate >90 >60 mL/min/1.73m2   HCG QUALitative pregnancy (blood)   Result Value Ref Range    hCG Serum Qualitative Negative Negative   Result Value Ref Range    Lipase 12 0 - 52 U/L   UA with Microscopic reflex to Culture    Specimen: Urine, Midstream   Result Value Ref Range    Color Urine Light Yellow Colorless, Straw, Light Yellow, Yellow    Appearance Urine Clear Clear    Glucose Urine Negative Negative mg/dL    Bilirubin Urine Negative Negative    Ketones Urine Trace (A) Negative mg/dL    Specific Gravity Urine 1.033 (H) 1.001 - 1.030    Blood Urine Negative Negative    pH Urine 6.0 5.0 - 7.0    Protein Albumin Urine Negative Negative mg/dL    Urobilinogen Urine <2.0 <2.0 mg/dL    Nitrite Urine Negative Negative    Leukocyte Esterase Urine Negative Negative    Mucus Urine Present (A) None Seen /LPF    RBC Urine 1 <=2 /HPF    WBC Urine 1 <=5 /HPF    Squamous Epithelials Urine 2 (H) <=1 /HPF       RADIOLOGY:  Reviewed all pertinent imaging. Please see official radiology report.  US Pelvis Cmplt w Transvag & Doppler LmtPel Duplex Limited   Preliminary Result   IMPRESSION:     1.  Trace pelvic free fluid, likely physiologic. No other findings identified to explain patient's symptoms.   2.  Couple of rounded echogenic structures involving the endometrium with the largest measuring 6 mm. These could represent endometrial polyps. A saline sonohysterogram or MRI could be performed for clarification if warranted.               CT Abdomen Pelvis w Contrast   Final Result   IMPRESSION:    1.  Mild right hydroureter may be transient and related to mass effect from adjacent structures.  No ureteral calculi.   2.  Low-attenuation right and left adnexal areas are likely follicles. Cannot assess for hydrosalpinx on this study. Recommend ultrasound if there is concern for an adnexal process.    3.  Trace free fluid in the pelvis is likely physiologic.            I, Gopi Caballero, am serving as a scribe to document services personally performed by Wiliam Almazan MD based on my observation and the provider's statements to me. I, Dr. Wiliam Almazan, attest that Gopi Caballero is acting in a scribe capacity, has observed my performance of the services and has documented them in accordance with my direction.    Wiliam Almazan MD  Emergency Medicine  [facility] EMERGENCY ROOM  [facility] [facility] [id number] [phone]     Wiliam Almazan MD  10/10/22 1766

## 2022-10-10 NOTE — DISCHARGE INSTRUCTIONS
As we discussed its unclear what was causing your symptoms your right ureter seemed a little dilated suggestive of possibly a recent passed kidney stone.  Urine does not look infected.  We do not see evidence ovarian cysts or appendicitis    Recheck with primary care doctor.  Return to the ER for worsening symptoms.    Use Dramamine, Tylenol, ibuprofen for pain.  Use oxycodone for breakthrough pain.    Recommend cut back on your alcohol use because we are seeing changes and inflammation of your liver

## 2022-10-10 NOTE — ED TRIAGE NOTES
Pt arrives via EMS for sudden right sided abdominal pain that started this morning.  Pt denies nausea or urinary symptoms.

## 2022-10-11 DIAGNOSIS — M25.561 CHRONIC PAIN OF BOTH KNEES: ICD-10-CM

## 2022-10-11 DIAGNOSIS — M25.562 CHRONIC PAIN OF BOTH KNEES: ICD-10-CM

## 2022-10-11 DIAGNOSIS — G89.29 CHRONIC PAIN OF MULTIPLE JOINTS: ICD-10-CM

## 2022-10-11 DIAGNOSIS — M79.7 FIBROMYALGIA: ICD-10-CM

## 2022-10-11 DIAGNOSIS — M54.9 CHRONIC UPPER BACK PAIN: ICD-10-CM

## 2022-10-11 DIAGNOSIS — M70.61 TROCHANTERIC BURSITIS OF BOTH HIPS: ICD-10-CM

## 2022-10-11 DIAGNOSIS — G89.29 CHRONIC UPPER BACK PAIN: ICD-10-CM

## 2022-10-11 DIAGNOSIS — M54.2 CHRONIC NECK PAIN: ICD-10-CM

## 2022-10-11 DIAGNOSIS — G89.29 CHRONIC PAIN OF BOTH KNEES: ICD-10-CM

## 2022-10-11 DIAGNOSIS — M70.62 TROCHANTERIC BURSITIS OF BOTH HIPS: ICD-10-CM

## 2022-10-11 DIAGNOSIS — M25.50 CHRONIC PAIN OF MULTIPLE JOINTS: ICD-10-CM

## 2022-10-11 DIAGNOSIS — G89.29 CHRONIC NECK PAIN: ICD-10-CM

## 2022-10-11 RX ORDER — MELOXICAM 7.5 MG/1
TABLET ORAL
Qty: 180 TABLET | Refills: 1 | Status: CANCELLED | OUTPATIENT
Start: 2022-10-11

## 2022-10-11 NOTE — TELEPHONE ENCOUNTER
Dr Carlton no longer with [facility], no other Rheum providers see Fibromyalgia in [facility] system. Request should go to PCP.

## 2022-10-11 NOTE — TELEPHONE ENCOUNTER
Refill request from [id number]    Medication: Meloxicam 7.5mg  Last Refill: 8/2/22  Last OV: 7/27/22  Last lab: 7/27/22  No future appt to establish care

## 2022-10-13 ENCOUNTER — MYC MEDICAL ADVICE (OUTPATIENT)
Dept: FAMILY MEDICINE | Facility: CLINIC | Age: 37
End: 2022-10-13

## 2022-10-13 DIAGNOSIS — R93.5 ABNORMAL ENDOMETRIAL ULTRASOUND: ICD-10-CM

## 2022-10-14 PROBLEM — R93.5 ABNORMAL ENDOMETRIAL ULTRASOUND: Status: ACTIVE | Noted: 2022-10-14

## 2022-11-08 ENCOUNTER — OFFICE VISIT (OUTPATIENT)
Dept: FAMILY MEDICINE | Facility: CLINIC | Age: 37
End: 2022-11-08
Payer: COMMERCIAL

## 2022-11-08 VITALS
SYSTOLIC BLOOD PRESSURE: 122 MMHG | DIASTOLIC BLOOD PRESSURE: 70 MMHG | WEIGHT: 161 LBS | BODY MASS INDEX: 25.22 KG/M2 | HEART RATE: 105 BPM | OXYGEN SATURATION: 98 %

## 2022-11-08 DIAGNOSIS — Z51.81 ENCOUNTER FOR THERAPEUTIC DRUG MONITORING: ICD-10-CM

## 2022-11-08 DIAGNOSIS — G89.4 CHRONIC PAIN SYNDROME: ICD-10-CM

## 2022-11-08 DIAGNOSIS — R79.89 ELEVATED LFTS: ICD-10-CM

## 2022-11-08 DIAGNOSIS — N13.4 HYDROURETER ON RIGHT: ICD-10-CM

## 2022-11-08 DIAGNOSIS — Z79.899 ENCOUNTER FOR LONG-TERM (CURRENT) USE OF MEDICATIONS: Primary | ICD-10-CM

## 2022-11-08 LAB
ALT SERPL W P-5'-P-CCNC: 13 U/L (ref 10–35)
AST SERPL W P-5'-P-CCNC: 14 U/L (ref 10–35)
CREAT SERPL-MCNC: 1.13 MG/DL (ref 0.51–0.95)
GFR SERPL CREATININE-BSD FRML MDRD: 64 ML/MIN/1.73M2

## 2022-11-08 PROCEDURE — 84450 TRANSFERASE (AST) (SGOT): CPT | Performed by: FAMILY MEDICINE

## 2022-11-08 PROCEDURE — 99214 OFFICE O/P EST MOD 30 MIN: CPT | Performed by: FAMILY MEDICINE

## 2022-11-08 PROCEDURE — 84460 ALANINE AMINO (ALT) (SGPT): CPT | Performed by: FAMILY MEDICINE

## 2022-11-08 PROCEDURE — 82565 ASSAY OF CREATININE: CPT | Performed by: FAMILY MEDICINE

## 2022-11-08 PROCEDURE — 36415 COLL VENOUS BLD VENIPUNCTURE: CPT | Performed by: FAMILY MEDICINE

## 2022-11-08 RX ORDER — CYCLOBENZAPRINE HCL 10 MG
10 TABLET ORAL 3 TIMES DAILY PRN
Qty: 90 TABLET | Refills: 3 | Status: SHIPPED | OUTPATIENT
Start: 2022-11-08 | End: 2023-03-19

## 2022-11-08 RX ORDER — MELOXICAM 7.5 MG/1
7.5 TABLET ORAL DAILY
Qty: 180 TABLET | Refills: 1 | Status: SHIPPED | OUTPATIENT
Start: 2022-11-08 | End: 2023-10-31

## 2022-11-08 NOTE — PROGRESS NOTES
Problem List Items Addressed This Visit        Nervous and Auditory    Chronic pain syndrome     Rheumatologist Dr. Carlton mentioned fibromyalgia but no official diagnosis. Dr. Carlton has left practice.     She says flexeril 10 mg once a day at bedtime and meloxicam 7.5mg bid was a combination that really took the edge off. Will rx these meds for her going forward.     Will also put in a referral to pain clinic to see if they have any other pain relief modalities to help her.     Monitor creatinine every 6 months.         Relevant Orders    Pain Management  Referral       Urinary    Hydroureter on right     Patient with severe right flank pain and finding of right hydroureter on ct scan 10/10/2022. Suspected passed kidney stone caused pain and the hydroureter. Patient wants to know how to prevent future similar episodes and if the hydroureter is resolved.     Recommend 60- 80 of water per day.   [facility] referral given.   Ultrasound ordered to see if the hydroureter is resolved.             Other    Elevated LFTs     Elevated ast and alt noted incidentally on blood work done in ER for right flank pain 10/10/2022.     Will repeat now.     Suspect it was related to tizanidine use and acute ingestion of alcohol at a winery (unusual for her) the day before.          Relevant Orders    AST    ALT   Other Visit Diagnoses     Encounter for long-term (current) use of medications    -  Primary    Relevant Medications    cyclobenzaprine (FLEXERIL) 10 MG tablet    meloxicam (MOBIC) 7.5 MG tablet    Other Relevant Orders    Adult Urology  Referral    US Kidney Right    Encounter for therapeutic drug monitoring        Relevant Orders    Creatinine             Subjective   Stephenie is a 36 year old who presents for the following health issues   Chief Complaint   Patient presents with     ER F/U              Objective    /70 (BP Location: Left arm, Patient Position: Left side, Cuff  Size: Adult Regular)   Pulse 105   Wt 73 kg (161 lb)   LMP 10/06/2022   SpO2 98%   BMI 25.22 kg/m    Body mass index is 25.22 kg/m .  Physical Exam  Constitutional:       Appearance: Normal appearance.   HENT:      Head: Normocephalic and atraumatic.   Cardiovascular:      Rate and Rhythm: Normal rate and regular rhythm.   Pulmonary:      Effort: Pulmonary effort is normal.   Musculoskeletal:         General: Normal range of motion.      Cervical back: Normal range of motion and neck supple.   Neurological:      General: No focal deficit present.      Mental Status: She is alert and oriented to person, place, and time.                This note has been dictated using voice recognition software. Any grammatical or context distortions are unintentional and inherent to the software

## 2022-11-08 NOTE — ASSESSMENT & PLAN NOTE
Rheumatologist Dr. Carlton mentioned fibromyalgia but no official diagnosis. Dr. Carlton has left practice.     She says flexeril 10 mg once a day at bedtime and meloxicam 7.5mg bid was a combination that really took the edge off. Will rx these meds for her going forward.     Will also put in a referral to pain clinic to see if they have any other pain relief modalities to help her.     Monitor creatinine every 6 months.

## 2022-11-08 NOTE — ASSESSMENT & PLAN NOTE
Elevated ast and alt noted incidentally on blood work done in ER for right flank pain 10/10/2022.     Will repeat now.     Suspect it was related to tizanidine use and acute ingestion of alcohol at a winery (unusual for her) the day before.

## 2022-11-08 NOTE — ASSESSMENT & PLAN NOTE
Patient with severe right flank pain and finding of right hydroureter on ct scan 10/10/2022. Suspected passed kidney stone caused pain and the hydroureter. Patient wants to know how to prevent future similar episodes and if the hydroureter is resolved.     Recommend 60- 80 of water per day.   [facility] referral given.   Ultrasound ordered to see if the hydroureter is resolved.

## 2022-11-09 ENCOUNTER — HOSPITAL ENCOUNTER (OUTPATIENT)
Dept: ULTRASOUND IMAGING | Facility: CLINIC | Age: 37
Discharge: HOME | End: 2022-11-09
Attending: FAMILY MEDICINE | Admitting: FAMILY MEDICINE
Payer: COMMERCIAL

## 2022-11-09 DIAGNOSIS — Z79.899 ENCOUNTER FOR LONG-TERM (CURRENT) USE OF MEDICATIONS: ICD-10-CM

## 2022-11-09 PROCEDURE — 76775 US EXAM ABDO BACK WALL LIM: CPT

## 2022-11-15 ENCOUNTER — TRANSFERRED RECORDS (OUTPATIENT)
Dept: HEALTH INFORMATION MANAGEMENT | Facility: CLINIC | Age: 37
End: 2022-11-15

## 2022-11-15 ENCOUNTER — LAB (OUTPATIENT)
Dept: LAB | Facility: CLINIC | Age: 37
End: 2022-11-15
Payer: COMMERCIAL

## 2022-11-15 LAB
CREAT SERPL-MCNC: 0.79 MG/DL (ref 0.51–0.95)
GFR SERPL CREATININE-BSD FRML MDRD: >90 ML/MIN/1.73M2

## 2022-11-15 PROCEDURE — 82565 ASSAY OF CREATININE: CPT

## 2022-11-15 PROCEDURE — 36415 COLL VENOUS BLD VENIPUNCTURE: CPT

## 2023-01-14 ENCOUNTER — HEALTH MAINTENANCE LETTER (OUTPATIENT)
Age: 38
End: 2023-01-14

## 2023-01-30 NOTE — PROGRESS NOTES
[facility] Consultation    Date of visit: 1/31/2023    Reason for consultation:    Stephenie Menendez is a 37 year old female who is seen in consultation today at the request of Dr. Matthews    PCP is Susy Matthews.  Pain medications are being prescribed by PCP.      Chief Complaint:    Chief Complaint   Patient presents with     Pain     Generall, all over, joint and muscle, fibromyalgia diagnosis in the past, most pain in knees and hands and then shoulder, feet are painful in the AM       Pain history:  Stephenie Menendez is a 37 year old female presenting with a chief pain complaint of diffuse body pain    Onset: 2014 - got infectious mononucleosis with muscle fatigue and pain. Worsened March 2020 after getting COVID  Location: neck, hands, shoulders, knee joints, sometimes feet. Muscle pain is diffuse  Provoking: any use of muscle  Palliating: heat, laying dows  Quality: dull, aching, occasionally sharp  Radiation: occasionally neck pain shooting down mostly L arm primarily  Severity: 3-9/10  Timing: worse in morning, better throughout the day  Numbness: denies  Weakness: hands    Current pain medications include:  Flexeril 10mg nightly - helpful for pain and gets a full night's sleep  Mobic nightly - helpful to decrease pain numbers    Previous medication treatments included:  Tizanidine - affected LFTs  Ibuprofen  Gabapentin - NH and developed migraines  Tylenol  Duloxetine - allergic hives, SOB    Other treatments have included:  Stephenie Menendez has not been seen at a pain clinic in the past.   PT: has gone previously, continues to do HEP  Injections:    Left knee CSI - swelled up for 3 days but then did help  Surgery: none  Alternative: chiro - helpful for a few days.    Past Medical History:  Past Medical History:   Diagnosis Date     Acute antritis 2/17/2016    Formatting of this note might be different from the original. Overview:  Recommend  over-the-counter Flonase and sinus nasal rinsing with Ocean Spray or simply saline and return to clinic if not improved in the next 2 weeks.  Didn't work worsened. - try augmentin  Last Assessment & Plan:  Recommend over-the-counter Flonase and sinus nasal rinsing with Ocean Spray or simply saline did not help.  And     Adjustment disorder with mixed anxiety and depressed mood 12/31/2019     Anemia      Arthritis      Breast asymmetry in female 11/28/2018     Depression      Depressive disorder      Fibroadenoma of breast, left 9/15/2021     Leukopenia 12/11/2018     Major depressive disorder, recurrent episode (H)     celexa 10 mg po q day   Replacement Utility updated for latest IMO load     Recurrent major depressive episodes (H) 2/16/2017    celexa 10 mg po q day Replacement Utility updated for latest IMO load   Formatting of this note might be different from the original. Overview:  celexa 10 mg po q day  Replacement Utility updated for latest IMO load  Last Assessment & Plan:  She was on Celexa from April through July and then self discontinued it.  This is about the same time when she started to feel fatigued.  I suggested restarti     Patient Active Problem List    Diagnosis Date Noted     Hydroureter on right 11/08/2022     Priority: Medium     Elevated LFTs 11/08/2022     Priority: Medium     Chronic pain syndrome 11/08/2022     Priority: Medium     Abnormal endometrial ultrasound 10/14/2022     Priority: Medium     Breast lump on right side at 10 o'clock position 11/03/2021     Priority: Medium     Personal history of COVID-19 05/26/2021     Priority: Medium     Last Assessment & Plan:   Formatting of this note might be different from the original.  gets light headed, winded, heart racing talking, or doing regular house hold activities, sometimes sats down to 93, feels foggy/ confused/ word finding problems/ lost driving, and insomnia since covid    Referral placed to post covid - syndrome   Positive  covid was 4/27/2020  Negative antibody test 10/2020    gets light headed, winded, heart racing talking, or doing regular house hold activities, sometimes sats down to 93, feels foggy/ confused/ word finding problems/ lost driving, and insomnia since covid    Cardiac echo also ordered today as well as thyroid       Exercise intolerance 05/26/2021     Priority: Medium     Overweight (BMI 25.0-29.9) 05/26/2021     Priority: Medium     Chronic pain of left knee 12/03/2020     Priority: Medium     ASCUS of cervix with negative high risk HPV 12/11/2018     Priority: Medium     4/2/14 NIL  11/28/18 ASCUS, neg HPV  5/26/21 ASCUS, neg HPV. Plan: cotest in 1 year  6/4/21 Mychart results sent / mychart read  07/1/22 Lost to follow-up for pap tracking, fyi routed to provider  08/3/22 NIL Pap, Neg HR HPV Plan cotest in 1 year due 08/3/23        Mixed hyperlipidemia 12/11/2018     Priority: Medium     Formatting of this note might be different from the original.  Last Assessment & Plan:   Formatting of this note might be different from the original.  Lab Results   Component Value Date    CHOL 229 (H) 11/28/2018    CHOL 176 04/02/2014     Lab Results   Component Value Date    HDL 79 11/28/2018    HDL 72 04/02/2014     Lab Results   Component Value Date    LDLCALC 136 (H) 11/28/2018    LDLCALC 96.6 04/02/2014     Lab Results   Component Value Date    TRIG 69 11/28/2018    TRIG 37 04/02/2014     No components found for: CHOLHDL    lipids up - statin decision aid at follow up - statin currently not indicated. Continue to monitor and continue healthy lifestyle.       Preventative health care 11/27/2018     Priority: Medium     Neck pain 11/21/2017     Priority: Medium     Formatting of this note might be different from the original.  Last Assessment & Plan:   NEW PROBLEM  She has not had neck pain since 2017, but fell 2.5 weeks ago on Sunday.  Since then she has had bad pain that has persisted and it is limiting her range of motion  and activities. Xray today was normal and was reviewed personally and later by radiology    Because pain is out of proportion to exam, I would like to get an MRI neck to rule out occult fx, although it seems unlikely she would have been able to put up with such a serious injury for 2.5 weeks before seeking medical attention, but still I would like to be sure we are not missing fx. I think it is more likely that she may have arthropathy (as she does have baseline arthritis) in her neck that has flared up with the trauma from her fall.     Recommend ibuprofen 800mg po three times a day and acetaminophen up to 4000 mg po per 24 hours   Flexeril and norco given to use for break through pain sparingly. She knows these are potentially addictive. She will follow up for any needed refills. She is cautioned not to use more than 4000 mg acetaminophen daily and she knows acetaminophen is in the norco    Follow up in 1 week if pain persists, and sooner if worsening.       Palpitations 02/16/2017     Priority: Medium     Formatting of this note might be different from the original.  Overview:   Created by Conversion       Vitamin D deficiency 02/26/2016     Priority: Medium     Anemia 02/17/2016     Priority: Medium     Fatigue 02/17/2016     Priority: Medium     Formatting of this note is different from the original.  Overview:   We'll draw preliminary labs.  Secondary labs can be done if she is not improving and the first set of labs is normal.  Anemia noted hgb 11.9    Last Assessment & Plan:   Formatting of this note may be different from the original.  We'll draw follow-up labs.    preliminary labs did show some vitamin D deficiency and some mild anemia.  However nothing seemed to clearly explain the degree of fatigue that she is feeling.  Currently she does also seem to have a sinus infection so we will treat that.  Her depression is improved on the Celexa and so hopefully that will also be helpful.  I will put in the  following lab orders to ensure there is nothing further going on.    Orders Placed This Encounter   Procedures     Jesse-Barr Virus (EBV) Antibody Profile     Mononucleosis Screen     CK Total     Lyme Antibody Cascade     Antinuclear Antibody (NEY) Cascade     Rheumatoid Factor Quant     Celiac(Gluten)Antibody Panel     HLA-B27 Antigen     Comprehensive Metabolic Panel     Magnesium     HM1 (CBC with Diff)       Temporomandibular joint disorder 2003     Priority: Medium     Last Assessment & Plan:   Formatting of this note might be different from the original.  She has seen Dr. Millan - rheumatology back in 2018, they wanted her to come back in 3 months, but she is not interested. Wants natural therapies. Will refer to Dr. Mcknight for functional medicine consult.    Formatting of this note might be different from the original.  Temporomandibular Joint Dis  NOS         Past Surgical History:  Past Surgical History:   Procedure Laterality Date     TUBAL LIGATION       ZZC  DELIVERY ONLY      Description:  Section;  Recorded: 2014;     Medications:  Current Outpatient Medications   Medication Sig Dispense Refill     cyclobenzaprine (FLEXERIL) 10 MG tablet Take 1 tablet (10 mg) by mouth 3 times daily as needed for muscle spasms 90 tablet 3     meloxicam (MOBIC) 7.5 MG tablet Take 1 tablet (7.5 mg) by mouth daily 180 tablet 1     Allergies:     Allergies   Allergen Reactions     Cymbalta Difficulty breathing and Rash     Duloxetine Rash and Shortness Of Breath     THROAT CONSTRICTION     Imitrex [Sumatriptan]      Respiratory distress.       Social History:  Home situation:  and 3 children, house  Occupation/Schooling: part time   Tobacco use: none  Alcohol use: occasional red wine  Drug use: none  History of chemical dependency treatment: none    Family history:  Family History   Problem Relation Age of Onset     Hypertension Mother      Hyperlipidemia Mother       Hyperthyroidism Mother         benign mass removed.     Thyroid Disease Mother      Hypertension Father      Prostate Cancer Father      Hyperlipidemia Father      Anxiety Disorder Father      Depression Brother      Anxiety Disorder Brother      Autism Spectrum Disorder Daughter      Attention Deficit Disorder Son      Autism Spectrum Disorder Son      Diabetes Maternal Grandmother      Depression Maternal Grandmother      Heart Disease Maternal Grandmother      Hypertension Maternal Grandmother      Colon Cancer Maternal Grandmother      Lung Cancer Maternal Grandmother      Goiter Maternal Grandmother      Thyroid Disease Maternal Grandmother      Hypertension Maternal Grandfather      Hypertension Paternal Grandmother      Cancer Paternal Grandmother         stomach     Hypertension Paternal Grandfather      Breast Cancer No family hx of        Physical Exam:  Vitals:    01/31/23 1523   BP: 139/74   BP Location: Left arm   Patient Position: Sitting   Pulse: 97   Resp: 18   SpO2: 100%     Exam:  Constitutional: Well developed, NAD  Head: normocephalic. Atraumatic.   Eyes: no redness or jaundice noted   CV: warm, well perfused extremities   Skin: no suspicious lesions or rashes   Psychiatric: mentation appears normal and affect full    Neuromuscular Examination:  TTP at bilateral cervical paraspinals, trapezius, rhomboids, biceps, intrinsic hand muscles, intrinsic foot muscles  5/5 BUE and BLE strength except 4/5 bilateral ADM  Intact sensation to LT in BUE and BLE  Negative ankle clonus bilaterally    Diagnostic tests:  EXAM: XR KNEE BILATERAL 3 VIEWS  LOCATION: [facility]  DATE/TIME: 11/10/2021 12:46 PM     INDICATION:  Chronic pain of both knees, Chronic pain of both knees, Chronic pain of both knees  COMPARISON: None.                                                                      IMPRESSION:   Both knees negative for fracture or significant compartmental narrowing. No significant  effusion on either side.    Personally reviewed imaging: no    [place] Prescription Monitoring Program reviewed: yes    Outside records reviewed: no    Assessment:  1. Fibromyalgia  2. Myofascial pain syndrome  3. Chronic pain syndrome    Stephenie Menendez is a 37 year old female who presents with several years of diffuse joint and muscle pains.  Patient has previously seen rheumatology who made the diagnosis of fibromyalgia.  Based on examination today a myofascial pain syndrome or fibromyalgia would be an appropriate diagnosis.  Patient has tried multiple conservative therapies including physical therapy, cyclobenzaprine, meloxicam, all of which have some positive effect.  Today's she seeks a review of her care to see if there are any other strategies for managing her pain.  On exam, she has tenderness to palpation at the cervical paraspinals, bilateral trapezius muscles, bilateral rhomboids as well as other myofascial groups such as the biceps bilaterally, bilateral intrinsic hand muscles, bilateral intrinsic foot muscles.  In addition to her current medications, we will initiate Lyrica 50 mg twice daily as this is FDA approved for fibromyalgia.  She is not a candidate for duloxetine due to allergy.  We will also perform trigger point injections for the neck muscles.    Plan:  Diagnosis reviewed, treatment option addressed, and risk/benefits discussed.     1. Physical Therapy: continue HEP. Discussed initiating an aerobic exercise program in addition to the resistance program  2. Diagnostic Studies: none  3. Medication Management:   1. Start lyrica 50mg BID  4. Procedures:   1. Bilateral cervical/trapezius trigger point injection ordered  5. Pain Psychologist to address issues of relaxation, behavioral change, coping style, and other factors important to improvement: potentially in future  6. Follow up: 3 months    45 minutes spent on the date of encounter doing chart review, history, and exam documentation and  further activities as noted above.     Frank Nieves MD  Interventional Pain Medicine  [facility]

## 2023-01-31 ENCOUNTER — OFFICE VISIT (OUTPATIENT)
Dept: PALLIATIVE MEDICINE | Facility: OTHER | Age: 38
End: 2023-01-31
Attending: FAMILY MEDICINE
Payer: COMMERCIAL

## 2023-01-31 VITALS
RESPIRATION RATE: 18 BRPM | HEART RATE: 97 BPM | SYSTOLIC BLOOD PRESSURE: 139 MMHG | DIASTOLIC BLOOD PRESSURE: 74 MMHG | OXYGEN SATURATION: 100 %

## 2023-01-31 DIAGNOSIS — G89.4 CHRONIC PAIN SYNDROME: ICD-10-CM

## 2023-01-31 DIAGNOSIS — M79.7 FIBROMYALGIA: Primary | ICD-10-CM

## 2023-01-31 PROCEDURE — G0463 HOSPITAL OUTPT CLINIC VISIT: HCPCS | Performed by: STUDENT IN AN ORGANIZED HEALTH CARE EDUCATION/TRAINING PROGRAM

## 2023-01-31 PROCEDURE — G0463 HOSPITAL OUTPT CLINIC VISIT: HCPCS

## 2023-01-31 PROCEDURE — 99204 OFFICE O/P NEW MOD 45 MIN: CPT | Performed by: STUDENT IN AN ORGANIZED HEALTH CARE EDUCATION/TRAINING PROGRAM

## 2023-01-31 RX ORDER — PREGABALIN 50 MG/1
50 CAPSULE ORAL 2 TIMES DAILY
Qty: 60 CAPSULE | Refills: 2 | Status: SHIPPED | OUTPATIENT
Start: 2023-01-31 | End: 2023-04-18 | Stop reason: SINTOL

## 2023-01-31 ASSESSMENT — ANXIETY QUESTIONNAIRES
GAD7 TOTAL SCORE: 0
8. IF YOU CHECKED OFF ANY PROBLEMS, HOW DIFFICULT HAVE THESE MADE IT FOR YOU TO DO YOUR WORK, TAKE CARE OF THINGS AT HOME, OR GET ALONG WITH OTHER PEOPLE?: NOT DIFFICULT AT ALL
5. BEING SO RESTLESS THAT IT IS HARD TO SIT STILL: NOT AT ALL
GAD7 TOTAL SCORE: 0
7. FEELING AFRAID AS IF SOMETHING AWFUL MIGHT HAPPEN: NOT AT ALL
IF YOU CHECKED OFF ANY PROBLEMS ON THIS QUESTIONNAIRE, HOW DIFFICULT HAVE THESE PROBLEMS MADE IT FOR YOU TO DO YOUR WORK, TAKE CARE OF THINGS AT HOME, OR GET ALONG WITH OTHER PEOPLE: NOT DIFFICULT AT ALL
6. BECOMING EASILY ANNOYED OR IRRITABLE: NOT AT ALL
1. FEELING NERVOUS, ANXIOUS, OR ON EDGE: NOT AT ALL
4. TROUBLE RELAXING: NOT AT ALL
3. WORRYING TOO MUCH ABOUT DIFFERENT THINGS: NOT AT ALL
2. NOT BEING ABLE TO STOP OR CONTROL WORRYING: NOT AT ALL
7. FEELING AFRAID AS IF SOMETHING AWFUL MIGHT HAPPEN: NOT AT ALL

## 2023-01-31 ASSESSMENT — PAIN SCALES - PAIN ENJOYMENT GENERAL ACTIVITY SCALE (PEG)
AVG_PAIN_PASTWEEK: 5
INTERFERED_GENERAL_ACTIVITY: 4
PEG_TOTALSCORE: 4.33
INTERFERED_ENJOYMENT_LIFE: 4
AVG_PAIN_PASTWEEK: 5
INTERFERED_GENERAL_ACTIVITY: 4
PEG_TOTALSCORE: 4.33
INTERFERED_ENJOYMENT_LIFE: 4

## 2023-01-31 ASSESSMENT — PAIN SCALES - GENERAL: PAINLEVEL: MODERATE PAIN (5)

## 2023-01-31 NOTE — NURSING NOTE
PEG Score 1/31/2023   PEG Total Score 6.33     Patient referred by PCP in [place] for ongoing pain management and more focused care from this speciality.     Patient has a history of 1 cortisone injection in the left knee  Patient reports x-rays of hands and feet with rheumatology(within [facility])  Left knee and Neck MRI in the past few years(within [facility])    Patient would like to discuss medication management, current medications are helpful and help her to maintain a baseline but would like to learn about other options or if there are better alternatives.

## 2023-01-31 NOTE — PATIENT INSTRUCTIONS
Physical Therapy: continue HEP. Discussed initiating an aerobic exercise program in addition to the resistance program  Diagnostic Studies: none  Medication Management:   Start lyrica 50mg twice daily  Procedures:   Bilateral cervical/trapezius trigger point injection ordered  Pain Psychologist to address issues of relaxation, behavioral change, coping style, and other factors important to improvement: potentially in future  Follow up: 3 months    Frank Nieves MD  Interventional Pain Medicine  [facility]    Clinic Number:  [phone]  Call with any questions about your care and for scheduling assistance.   Calls are returned Monday through Friday between 8 AM and 4:30 PM. We usually get back to you within 2 business days depending on the issue/request.    If we are prescribing your medications:  For opioid medication refills, call the clinic or send a [email or web address] message 7 days in advance.  Please include:  Name of requested medication  Name of the pharmacy.  For non-opioid medications, call your pharmacy directly to request a refill. Please allow 3-4 days to be processed.   Per [place] State Law:  All controlled substance prescriptions must be filled within 30 days of being written.    For those controlled substances allowing refills, pickup must occur within 30 days of last fill.      We believe regular attendance is key to your success in our program!    Any time you are unable to keep your appointment we ask that you call us at least 24 hours in advance to cancel.This will allow us to offer the appointment time to another patient.   Multiple missed appointments may lead to dismissal from the clinic.

## 2023-02-14 ENCOUNTER — OFFICE VISIT (OUTPATIENT)
Dept: PALLIATIVE MEDICINE | Facility: OTHER | Age: 38
End: 2023-02-14
Payer: COMMERCIAL

## 2023-02-14 VITALS
RESPIRATION RATE: 16 BRPM | SYSTOLIC BLOOD PRESSURE: 132 MMHG | HEART RATE: 84 BPM | DIASTOLIC BLOOD PRESSURE: 78 MMHG | OXYGEN SATURATION: 99 %

## 2023-02-14 DIAGNOSIS — M79.7 FIBROMYALGIA: ICD-10-CM

## 2023-02-14 DIAGNOSIS — G89.4 CHRONIC PAIN SYNDROME: ICD-10-CM

## 2023-02-14 PROCEDURE — 96372 THER/PROPH/DIAG INJ SC/IM: CPT | Performed by: STUDENT IN AN ORGANIZED HEALTH CARE EDUCATION/TRAINING PROGRAM

## 2023-02-14 PROCEDURE — 20553 NJX 1/MLT TRIGGER POINTS 3/>: CPT | Performed by: STUDENT IN AN ORGANIZED HEALTH CARE EDUCATION/TRAINING PROGRAM

## 2023-02-14 PROCEDURE — 250N000011 HC RX IP 250 OP 636: Performed by: STUDENT IN AN ORGANIZED HEALTH CARE EDUCATION/TRAINING PROGRAM

## 2023-02-14 RX ORDER — TRIAMCINOLONE ACETONIDE 40 MG/ML
40 INJECTION, SUSPENSION INTRA-ARTICULAR; INTRAMUSCULAR ONCE
Status: COMPLETED | OUTPATIENT
Start: 2023-02-14 | End: 2023-02-14

## 2023-02-14 RX ADMIN — TRIAMCINOLONE ACETONIDE 40 MG: 40 INJECTION, SUSPENSION INTRA-ARTICULAR; INTRAMUSCULAR at 14:58

## 2023-02-14 ASSESSMENT — PAIN SCALES - GENERAL
PAINLEVEL: SEVERE PAIN (6)
PAINLEVEL: SEVERE PAIN (6)

## 2023-02-14 NOTE — NURSING NOTE
Pre-procedure Intake  If YES to any questions or NO to having a   Please complete laminated checklist and leave on the computer keyboard for Provider, verbally inform provider if able.    For SCS Trial, RFA's or any sedation procedure:  Have you been fasting? N/A      Are you taking any any blood thinners? No    Do you take aspirin?  No    If cervical procedure, have you held aspirin for 6 days?     No  Do you have any allergies to contrast dye, iodine, steroid and/or numbing medications?  N/A  Are you currently taking antibiotics or have an active infection?  No    Have you had a fever/elevated temperature within the past week? No    Are you currently taking oral steroids? No    Do you have a ?No    Are you pregnant or breastfeeding?  No    Have you received the COVID-19 vaccine? Yes    If yes, was it your 1st, 2nd or only dose needed? 2nd    Date of most recent vaccine: 01/2022       Notify provider and RNs if systolic BP >170, diastolic BP >100, P >100 or O2 sats < 90%

## 2023-02-14 NOTE — PATIENT INSTRUCTIONS
[facility]  Post Procedure Instructions    Today you saw:  Dr. Nieves    Today you had:  trigger point injections                           Medications used:  lidocaine   bupivicaine   kenolog         Go to the emergency room if you develop any shortness of breath  Monitor the injection sites for signs and symptoms of infection-fever, chills, redness, swelling, warmth, or drainage to areas.  You may have soreness at injection sites for up to 24 hours.  It may take up to 14 days for the steroid medication to start working although you may feel the effect as early as a few days after the procedure.     You may apply ice to the painful areas to help minimize the discomfort of the needle pokes.  Do not apply heat to sites for at least 12 hours.  You may use anti-inflammatory medications or Tylenol for pain control if necessary    Pain Clinic phone number:  [phone]

## 2023-02-14 NOTE — PROGRESS NOTES
Pre procedure Diagnosis: myofascial pain   Post procedure Diagnosis: Same  Procedure performed: trigger point injections  Anesthesia: none  Complications: none  Operators: Frank Nieves MD    Indications:   Stephenie Menendez is a 37 year old female with a history of fibromyalgia.  Exam shows myofascial pain of the muscle groups listed below and they have tried conservative treatment including medications.    Options/alternatives, benefits and risks were discussed with the patient including bleeding, infection, tissue trauma and pnuemothorax.  Questions were answered to her satisfaction and she agrees to proceed. Voluntary informed consent was obtained and signed.     Vitals were reviewed: Yes  Allergies were reviewed:  Yes   Medications were reviewed:  Yes   Pre-procedure pain score: 6/10    Procedure:  After getting informed consent, a Pause for the Cause was performed.    Trigger points were identified by patient, and marked when appropriate.  The area was prepped with Chloroprep.    Using clean technique, injections were completed using a 25G, 1.5 inch needle.  After negative aspiration, injection was completed.  A total of 10 locations were injected.  When possible, tissue was retracted from the chest wall to avoid lung injury.    Muscle groups injected:  Bilateral cervical paraspinals  Bilateral levator scapulae  Bilateral trapezius muscles  Bilateral rhomboids      Injection solution contained:  4.5ml of 1% lidocaine and 4.5ml of 0.5% bupivacaine and 40mg kenalog.    Hemostasis was achieved, the area was cleaned, and bandaids were placed when appropriate.  The patient tolerated the procedure well.    Post-procedure pain score: see flowsheet  Follow-up includes:   -repeat prn  -clinic visit 3 months from last office visit    Frank Nieves MD  Interventional Pain Medicine  [facility]

## 2023-02-28 ENCOUNTER — APPOINTMENT (OUTPATIENT)
Dept: URBAN - METROPOLITAN AREA CLINIC 260 | Age: 38
Setting detail: DERMATOLOGY
End: 2023-03-01

## 2023-02-28 VITALS — WEIGHT: 160 LBS | HEIGHT: 67 IN

## 2023-02-28 DIAGNOSIS — B07.8 OTHER VIRAL WARTS: ICD-10-CM

## 2023-02-28 PROCEDURE — OTHER SEPARATE AND IDENTIFIABLE DOCUMENTATION: OTHER

## 2023-02-28 PROCEDURE — 17110 DESTRUCT B9 LESION 1-14: CPT

## 2023-02-28 PROCEDURE — OTHER MIPS QUALITY: OTHER

## 2023-02-28 PROCEDURE — OTHER LIQUID NITROGEN: OTHER

## 2023-02-28 PROCEDURE — OTHER PRESCRIPTION MEDICATION MANAGEMENT: OTHER

## 2023-02-28 PROCEDURE — OTHER COUNSELING: OTHER

## 2023-02-28 PROCEDURE — OTHER PHOTO-DOCUMENTATION: OTHER

## 2023-02-28 PROCEDURE — OTHER PRESCRIPTION: OTHER

## 2023-02-28 PROCEDURE — 99203 OFFICE O/P NEW LOW 30 MIN: CPT | Mod: 25

## 2023-02-28 RX ORDER — TRETIONIN 0.25 MG/G
CREAM TOPICAL QHS
Qty: 20 | Refills: 2 | Status: ERX | COMMUNITY
Start: 2023-02-28

## 2023-02-28 ASSESSMENT — LOCATION DETAILED DESCRIPTION DERM
LOCATION DETAILED: LEFT MEDIAL BUCCAL CHEEK
LOCATION DETAILED: LEFT LOWER CUTANEOUS LIP
LOCATION DETAILED: RIGHT INFERIOR MEDIAL MALAR CHEEK
LOCATION DETAILED: LEFT DORSAL THUMB METACARPOPHALANGEAL JOINT
LOCATION DETAILED: RIGHT SUPERIOR MEDIAL BUCCAL CHEEK
LOCATION DETAILED: RIGHT MEDIAL BUCCAL CHEEK
LOCATION DETAILED: 2ND WEB SPACE RIGHT HAND
LOCATION DETAILED: RIGHT LOWER CUTANEOUS LIP
LOCATION DETAILED: RIGHT LATERAL BUCCAL CHEEK
LOCATION DETAILED: RIGHT UPPER CUTANEOUS LIP
LOCATION DETAILED: LEFT PROXIMAL DORSAL THUMB
LOCATION DETAILED: RIGHT CHIN
LOCATION DETAILED: LEFT UPPER CUTANEOUS LIP
LOCATION DETAILED: RIGHT CENTRAL BUCCAL CHEEK
LOCATION DETAILED: LEFT CHIN
LOCATION DETAILED: RIGHT RADIAL DORSAL HAND

## 2023-02-28 ASSESSMENT — LOCATION ZONE DERM
LOCATION ZONE: HAND
LOCATION ZONE: LIP
LOCATION ZONE: FACE
LOCATION ZONE: FINGER

## 2023-02-28 ASSESSMENT — LOCATION SIMPLE DESCRIPTION DERM
LOCATION SIMPLE: LEFT CHEEK
LOCATION SIMPLE: LEFT LIP
LOCATION SIMPLE: CHIN
LOCATION SIMPLE: RIGHT LIP
LOCATION SIMPLE: RIGHT HAND
LOCATION SIMPLE: RIGHT CHEEK
LOCATION SIMPLE: LEFT THUMB
LOCATION SIMPLE: LEFT HAND

## 2023-02-28 NOTE — PROCEDURE: LIQUID NITROGEN
Render Note In Bullet Format When Appropriate: No
Duration Of Freeze Thaw-Cycle (Seconds): 5-10
Post-Care Instructions: I reviewed with the patient in detail post-care instructions. Patient is to wear sunprotection, and avoid picking at any of the treated lesions. Pt may apply Vaseline to crusted or scabbing areas.
Show Applicator Variable?: Yes
Spray Paint Text: The liquid nitrogen was applied to the skin utilizing a spray paint frosting technique.
Medical Necessity Information: It is in your best interest to select a reason for this procedure from the list below. All of these items fulfill various CMS LCD requirements except the new and changing color options.
Consent: The patient's consent was obtained including but not limited to risks of crusting, scabbing, blistering, scarring, darker or lighter pigmentary change, recurrence, incomplete removal and infection.
Medical Necessity Clause: This procedure was medically necessary because the lesions that were treated were:
Number Of Freeze-Thaw Cycles: 3 freeze-thaw cycles
Detail Level: Detailed

## 2023-02-28 NOTE — PROCEDURE: PRESCRIPTION MEDICATION MANAGEMENT
Initiate Treatment: tretinoin 0.025 % topical cream QHS Apply to the face
Plan: Follow up in 2 weeks for wart recheck.
Render In Strict Bullet Format?: No
Defer Treatment (Provide Reason For Deferment In Text Field Below): Disc chemo cream vs Tretinoin vs ln2 vs cautery. Pt elects tretinoin. We will treat the hands with LN2 to see how she heals from the tmt. If improved, can consider this for the face
Detail Level: Zone

## 2023-02-28 NOTE — PROCEDURE: PHOTO-DOCUMENTATION
Details (Free Text): Face
Detail Level: Detailed
Photo Preface (Leave Blank If You Do Not Want): Photographs were obtained today to monitor

## 2023-03-17 ENCOUNTER — APPOINTMENT (OUTPATIENT)
Dept: URBAN - METROPOLITAN AREA CLINIC 260 | Age: 38
Setting detail: DERMATOLOGY
End: 2023-03-18

## 2023-03-17 VITALS — HEIGHT: 67 IN | WEIGHT: 160 LBS

## 2023-03-17 DIAGNOSIS — B07.8 OTHER VIRAL WARTS: ICD-10-CM

## 2023-03-17 PROCEDURE — OTHER COUNSELING: OTHER

## 2023-03-17 PROCEDURE — 99213 OFFICE O/P EST LOW 20 MIN: CPT

## 2023-03-17 PROCEDURE — OTHER MIPS QUALITY: OTHER

## 2023-03-17 PROCEDURE — OTHER PRESCRIPTION MEDICATION MANAGEMENT: OTHER

## 2023-03-17 ASSESSMENT — LOCATION SIMPLE DESCRIPTION DERM
LOCATION SIMPLE: LEFT HAND
LOCATION SIMPLE: RIGHT LIP
LOCATION SIMPLE: RIGHT HAND

## 2023-03-17 ASSESSMENT — LOCATION ZONE DERM
LOCATION ZONE: HAND
LOCATION ZONE: LIP

## 2023-03-17 ASSESSMENT — LOCATION DETAILED DESCRIPTION DERM
LOCATION DETAILED: LEFT RADIAL DORSAL HAND
LOCATION DETAILED: RIGHT RADIAL DORSAL HAND
LOCATION DETAILED: RIGHT LOWER CUTANEOUS LIP

## 2023-03-17 NOTE — PROCEDURE: PRESCRIPTION MEDICATION MANAGEMENT
Continue Regimen: tretinoin 0.025 % topical cream QHS for additional week to the face
Render In Strict Bullet Format?: No
Detail Level: Zone
Plan: Follow up as needed, encouraged to apply the cream for an additional week then can continue for anti-aging purposes or d/c

## 2023-03-17 NOTE — HPI: WARTS (VERRUCA)
Is This A New Presentation, Or A Follow-Up?: Follow Up Jose
Additional History: The tretinoin cream has removed most of the warts on the face but there are still some present . The LN2 tmt did work well on her hands

## 2023-03-19 DIAGNOSIS — Z79.899 ENCOUNTER FOR LONG-TERM (CURRENT) USE OF MEDICATIONS: ICD-10-CM

## 2023-03-19 RX ORDER — CYCLOBENZAPRINE HCL 10 MG
TABLET ORAL
Qty: 90 TABLET | Refills: 3 | Status: SHIPPED | OUTPATIENT
Start: 2023-03-19 | End: 2023-07-26

## 2023-04-17 ASSESSMENT — PAIN SCALES - PAIN ENJOYMENT GENERAL ACTIVITY SCALE (PEG)
PEG_TOTALSCORE: 3.33
PEG_TOTALSCORE: 3.33
AVG_PAIN_PASTWEEK: 4
AVG_PAIN_PASTWEEK: 4
INTERFERED_GENERAL_ACTIVITY: 3
INTERFERED_ENJOYMENT_LIFE: 3
INTERFERED_GENERAL_ACTIVITY: 3
INTERFERED_ENJOYMENT_LIFE: 3

## 2023-04-17 ASSESSMENT — ANXIETY QUESTIONNAIRES
7. FEELING AFRAID AS IF SOMETHING AWFUL MIGHT HAPPEN: NOT AT ALL
GAD7 TOTAL SCORE: 0
IF YOU CHECKED OFF ANY PROBLEMS ON THIS QUESTIONNAIRE, HOW DIFFICULT HAVE THESE PROBLEMS MADE IT FOR YOU TO DO YOUR WORK, TAKE CARE OF THINGS AT HOME, OR GET ALONG WITH OTHER PEOPLE: NOT DIFFICULT AT ALL
5. BEING SO RESTLESS THAT IT IS HARD TO SIT STILL: NOT AT ALL
GAD7 TOTAL SCORE: 0
4. TROUBLE RELAXING: NOT AT ALL
8. IF YOU CHECKED OFF ANY PROBLEMS, HOW DIFFICULT HAVE THESE MADE IT FOR YOU TO DO YOUR WORK, TAKE CARE OF THINGS AT HOME, OR GET ALONG WITH OTHER PEOPLE?: NOT DIFFICULT AT ALL
6. BECOMING EASILY ANNOYED OR IRRITABLE: NOT AT ALL
7. FEELING AFRAID AS IF SOMETHING AWFUL MIGHT HAPPEN: NOT AT ALL
3. WORRYING TOO MUCH ABOUT DIFFERENT THINGS: NOT AT ALL
1. FEELING NERVOUS, ANXIOUS, OR ON EDGE: NOT AT ALL
2. NOT BEING ABLE TO STOP OR CONTROL WORRYING: NOT AT ALL

## 2023-04-18 ENCOUNTER — OFFICE VISIT (OUTPATIENT)
Dept: PALLIATIVE MEDICINE | Facility: OTHER | Age: 38
End: 2023-04-18
Payer: COMMERCIAL

## 2023-04-18 VITALS
HEART RATE: 78 BPM | SYSTOLIC BLOOD PRESSURE: 135 MMHG | DIASTOLIC BLOOD PRESSURE: 79 MMHG | BODY MASS INDEX: 25.37 KG/M2 | OXYGEN SATURATION: 99 % | WEIGHT: 162 LBS

## 2023-04-18 DIAGNOSIS — M79.7 FIBROMYALGIA: Primary | ICD-10-CM

## 2023-04-18 PROCEDURE — G0463 HOSPITAL OUTPT CLINIC VISIT: HCPCS | Performed by: STUDENT IN AN ORGANIZED HEALTH CARE EDUCATION/TRAINING PROGRAM

## 2023-04-18 PROCEDURE — 99213 OFFICE O/P EST LOW 20 MIN: CPT | Performed by: STUDENT IN AN ORGANIZED HEALTH CARE EDUCATION/TRAINING PROGRAM

## 2023-04-18 ASSESSMENT — PAIN SCALES - GENERAL: PAINLEVEL: SEVERE PAIN (6)

## 2023-04-18 NOTE — PROGRESS NOTES
Patient presents to the clinic today for a follow up with Frank Nieves MD            1/31/2023     3:24 PM 4/18/2023     1:15 PM 4/18/2023     1:16 PM   PEG Score   PEG Total Score 6.33 5 5       UDS/CSA-    Medications-    QUESTIONS:    [facility]

## 2023-04-18 NOTE — PATIENT INSTRUCTIONS
Physical Therapy: continue HEP. She has initiated an aerobic exercise program in addition to the resistance program  Diagnostic Studies: none  Medication Management:   Stop lyrica 50mg BID due to side effects  Continue biofreeze  Lidocaine patches recommend  Voltaren gel ordered  Procedures:   Bilateral cervical/trapezius trigger point injection can be repeated 5/14/23  Pain Psychologist to address issues of relaxation, behavioral change, coping style, and other factors important to improvement: potentially in future  Follow up: 6 months    Frank Nieves MD  Interventional Pain Medicine  [facility]    Clinic Number:  [phone]  Call with any questions about your care and for scheduling assistance.   Calls are returned Monday through Friday between 8 AM and 4:30 PM. We usually get back to you within 2 business days depending on the issue/request.    If we are prescribing your medications:  For opioid medication refills, call the clinic or send a [employer] message 7 days in advance.  Please include:  Name of requested medication  Name of the pharmacy.  For non-opioid medications, call your pharmacy directly to request a refill. Please allow 3-4 days to be processed.   Per [place] State Law:  All controlled substance prescriptions must be filled within 30 days of being written.    For those controlled substances allowing refills, pickup must occur within 30 days of last fill.      We believe regular attendance is key to your success in our program!    Any time you are unable to keep your appointment we ask that you call us at least 24 hours in advance to cancel.This will allow us to offer the appointment time to another patient.   Multiple missed appointments may lead to dismissal from the clinic.

## 2023-04-18 NOTE — PROGRESS NOTES
[facility] Follow-up    Date of visit: 4/18/2023    Chief complaint:   Chief Complaint   Patient presents with     Pain     Pain Management       Interval history:  Since her last visit, Stephenie Menendez reports:  2/14/2023 TPI to neck performed - felt strange for a few days, but after 1 week felt significant relief. Also helped functionally get through her day. Pain is creeping up in the last week or so.    Lyrica made patient feel drowsy and she could not tolerate - for 5 days - no improvement noted during those days.    Doing more walking outside 30+ min, low impact weightlifting    Pain scores:  Pain intensity currently is 6 on a scale of 0-10.     Current pain medications include:  Flexeril 10mg nightly - helpful for pain and gets a full night's sleep  Mobic nightly - helpful to decrease pain numbers     Previous medication treatments included:  Tizanidine - affected LFTs  Ibuprofen  Gabapentin - NH and developed migraines  Tylenol  Duloxetine - allergic hives, SOB     Other treatments have included:  Stephenie Ochoancer has not been seen at a pain clinic in the past.   PT: has gone previously, continues to do HEP  Injections:               Left knee CSI - swelled up for 3 days but then did help  Surgery: none  Alternative: chiro - helpful for a few days.    Side Effects: Drowsiness from lyrica    Medications:  Current Outpatient Medications   Medication Sig Dispense Refill     cyclobenzaprine (FLEXERIL) 10 MG tablet TAKE 1 TABLET BY MOUTH 3 TIMES DAILY AS NEEDED FOR MUSCLE SPASMS 90 tablet 3     meloxicam (MOBIC) 7.5 MG tablet Take 1 tablet (7.5 mg) by mouth daily 180 tablet 1     pregabalin (LYRICA) 50 MG capsule Take 1 capsule (50 mg) by mouth 2 times daily (Patient not taking: Reported on 4/18/2023) 60 capsule 2       Medical History: any changes in medical history since they were last seen? No    Physical Exam:  Blood pressure 135/79, pulse 78,  weight 73.5 kg (162 lb), SpO2 99 %, not currently breastfeeding.  Constitutional: Well developed, NAD  Head: normocephalic. Atraumatic.   Eyes: no redness or jaundice noted   CV: warm, well perfused extremities   Skin: no suspicious lesions or rashes   Psychiatric: mentation appears normal and affect full    Assessment:   1. Fibromyalgia  2. Myofascial pain syndrome  3. Chronic pain syndrome     Stephenie Menendez is a 37 year old female who presents with several years of diffuse joint and muscle pains.  Patient has previously seen rheumatology who made the diagnosis of fibromyalgia.  Based on examination today a myofascial pain syndrome or fibromyalgia would be an appropriate diagnosis.  Patient has tried multiple conservative therapies including physical therapy, cyclobenzaprine, meloxicam, all of which have some positive effect.  At her last visit we tried adding Lyrica 50 mg twice a day, but the patient experienced significant drowsiness and stopped the medication after about 5 days, and she did not experience significant pain relief during that day period.  We performed trigger point injections which did give her significant relief, which is still ongoing but starting to wear off over the last week.  We discussed that we can repeat these injections every 3 months at most, which she is interested to continue.  We also discussed using Voltaren gel and topical lidocaine patches to assist with her pain relief.      Plan:  Diagnosis reviewed, treatment option addressed, and risk/benefits discussed.      1. Physical Therapy: continue HEP. She has initiated an aerobic exercise program in addition to the resistance program  2. Diagnostic Studies: none  3. Medication Management:   1. Stop lyrica 50mg BID due to side effects  2. Continue biofreeze  3. Lidocaine patches recommend  4. Voltaren gel ordered  4. Procedures:   1. Bilateral cervical/trapezius trigger point injection can be repeated 5/14/23  5. Pain Psychologist  to address issues of relaxation, behavioral change, coping style, and other factors important to improvement: potentially in future  6. Follow up: 6 months    Frank Nieves MD  Interventional Pain Medicine  [facility]

## 2023-04-27 ENCOUNTER — MYC MEDICAL ADVICE (OUTPATIENT)
Dept: FAMILY MEDICINE | Facility: CLINIC | Age: 38
End: 2023-04-27

## 2023-04-27 ENCOUNTER — VIRTUAL VISIT (OUTPATIENT)
Dept: URGENT CARE | Facility: CLINIC | Age: 38
End: 2023-04-27
Payer: COMMERCIAL

## 2023-04-27 DIAGNOSIS — U07.1 CLINICAL DIAGNOSIS OF COVID-19: Primary | ICD-10-CM

## 2023-04-27 PROCEDURE — 99213 OFFICE O/P EST LOW 20 MIN: CPT | Mod: CS

## 2023-04-27 NOTE — PROGRESS NOTES
"Stephenie is a 37 year old who is being evaluated via a billable phone visit.      Tested + for COVID last night.    Sx started 4/26.  Cough, ST, HA, body aches, chest congestion, cough  March 2020 first time getting COVID.    Assessment & Plan     Clinical diagnosis of COVID-19    - nirmatrelvir and ritonavir (PAXLOVID) 300 mg/100 mg therapy pack; Take 3 tablets by mouth 2 times daily for 5 days (Take 2 Nirmatrelvir tablets and 1 Ritonavir tablet twice daily for 5 days)    COVID-19 positive patient.  Encounter for consideration of medication intervention. Patient does qualify for a prescription. Full discussion with patient including medication options, risks and benefits. Potential drug interactions reviewed with patient.     Treatment Planned Paxlovid RX sent to [facility]    Temporary change to home medications:  None     Estimated body mass index is 25.37 kg/m  as calculated from the following:    Height as of 10/10/22: 1.702 m (5' 7\").    Weight as of 4/18/23: 73.5 kg (162 lb).  GFR Estimate   Date Value Ref Range Status   11/15/2022 >90 >60 mL/min/1.73m2 Final     Comment:     Effective December 21, 2021 eGFRcr in adults is calculated using the 2021 CKD-EPI creatinine equation which includes age and gender (Olive et al., NEJ, DOI: 10.1056/SRJXhx1219361)   05/28/2021 >60 >60 mL/min/1.73m2 Final     Joselyn Mcintyre MD  Virtual Urgent Care  [facility] VIRTUAL URGENT CARE    Subjective   Stephenie is a 37 year old, presenting for the following health issues:  No chief complaint on file.        11/3/2021     9:37 AM   Additional Questions   Roomed by xl     HPI   Tested + for COVID last night.    Sx started 4/26.  Cough, ST, HA, body aches, chest congestion, cough  March 2020 first time getting COVID.          Review of Systems   Constitutional, HEENT, cardiovascular, pulmonary, GI, , musculoskeletal, neuro, skin, endocrine and psych systems are negative, except as otherwise noted.    "   Objective             Physical Exam   GENERAL: Healthy, alert and no distress  PSYCH: mentation appears normal and affect normal/bright    Phone call duration # 7 minutes.

## 2023-04-27 NOTE — TELEPHONE ENCOUNTER
RN COVID TREATMENT VISIT  04/27/23      The patient has been triaged and does not require a higher level of care (patient will be seen at [facility] urgent care)    Stephenie Menendez  37 year old  Current weight? 162lbs    Has the patient been seen by a primary care provider at an [facility] or [facility] within the past two years? Yes.   Have you been in close proximity to/do you have a known exposure to a person with a confirmed case of influenza? No.     General treatment eligibility:  Date of positive COVID test (PCR or at home)?  4/26/23    Are you or have you been hospitalized for this COVID-19 infection? No.   Have you received monoclonal antibodies or antiviral treatment for COVID-19 since this positive test? No.   Do you have any of the following conditions that place you at risk of being very sick from COVID-19?   - Overweight or Obesity (BMI >85th percentile or BMI 25 or higher)  Yes, patient has at least one high risk condition as noted above.     Current COVID symptoms:   - fever or chills  - muscle or body aches  - headache  - sore throat  Yes. Patient has at least one symptom as selected.     How many days since symptoms started? 5 days or less. Established patient, 12 years or older weighing at least 88.2 lbs, who has symptoms that started in the past 5 days, has not been hospitalized nor received treatment already, and is at risk for being very sick from COVID-19.     Treatment eligibility by RN:    Are you currently pregnant or nursing? No    Do you have a clinically significant hypersensitivity to nirmatrelvir or ritonavir, or toxic epidermal necrolysis (TEN) or Ayala-Atul Syndrome? No    Do you have a history of hepatitis, any hepatic impairment on the Problem List (such as Child-Walsh Class C, cirrhosis, fatty liver disease, alcoholic liver disease), or was the last liver lab (hepatic panel, ALT, AST, ALK Phos, bilirubin) elevated in the past 6 months? YES    Do  you have any history of severe renal impairment (eGFR < 30mL/min)? No    Is patient eligible to continue? No, patient does not meet all eligibility requirements for the RN COVID treatment (as denoted by yes response(s) above). Patient informed they will need a virtual provider visit to assess treatment options.  Patient will be transferred to a  at the end of this call.   Damon Bennett RN

## 2023-06-01 ENCOUNTER — OFFICE VISIT (OUTPATIENT)
Dept: PALLIATIVE MEDICINE | Facility: OTHER | Age: 38
End: 2023-06-01
Payer: COMMERCIAL

## 2023-06-01 VITALS — OXYGEN SATURATION: 100 % | SYSTOLIC BLOOD PRESSURE: 133 MMHG | DIASTOLIC BLOOD PRESSURE: 80 MMHG | HEART RATE: 84 BPM

## 2023-06-01 DIAGNOSIS — M79.7 FIBROMYALGIA: Primary | ICD-10-CM

## 2023-06-01 PROCEDURE — 20553 NJX 1/MLT TRIGGER POINTS 3/>: CPT | Performed by: STUDENT IN AN ORGANIZED HEALTH CARE EDUCATION/TRAINING PROGRAM

## 2023-06-01 PROCEDURE — 250N000011 HC RX IP 250 OP 636: Performed by: STUDENT IN AN ORGANIZED HEALTH CARE EDUCATION/TRAINING PROGRAM

## 2023-06-01 PROCEDURE — 250N000009 HC RX 250: Performed by: STUDENT IN AN ORGANIZED HEALTH CARE EDUCATION/TRAINING PROGRAM

## 2023-06-01 PROCEDURE — 96372 THER/PROPH/DIAG INJ SC/IM: CPT | Performed by: STUDENT IN AN ORGANIZED HEALTH CARE EDUCATION/TRAINING PROGRAM

## 2023-06-01 RX ORDER — LIDOCAINE HYDROCHLORIDE 10 MG/ML
4.5 INJECTION, SOLUTION EPIDURAL; INFILTRATION; INTRACAUDAL; PERINEURAL ONCE
Status: COMPLETED | OUTPATIENT
Start: 2023-06-01 | End: 2023-06-01

## 2023-06-01 RX ORDER — BUPIVACAINE HYDROCHLORIDE 5 MG/ML
4.5 INJECTION, SOLUTION EPIDURAL; INTRACAUDAL ONCE
Status: COMPLETED | OUTPATIENT
Start: 2023-06-01 | End: 2023-06-01

## 2023-06-01 RX ORDER — TRIAMCINOLONE ACETONIDE 40 MG/ML
40 INJECTION, SUSPENSION INTRA-ARTICULAR; INTRAMUSCULAR ONCE
Status: COMPLETED | OUTPATIENT
Start: 2023-06-01 | End: 2023-06-01

## 2023-06-01 RX ADMIN — LIDOCAINE HYDROCHLORIDE 4.5 ML: 10 SOLUTION INTRAVENOUS at 16:23

## 2023-06-01 RX ADMIN — TRIAMCINOLONE ACETONIDE 40 MG: 40 INJECTION, SUSPENSION INTRA-ARTICULAR; INTRAMUSCULAR at 16:23

## 2023-06-01 RX ADMIN — BUPIVACAINE HYDROCHLORIDE 22.5 MG: 5 INJECTION, SOLUTION EPIDURAL; INTRACAUDAL; PERINEURAL at 16:23

## 2023-06-01 ASSESSMENT — PAIN SCALES - GENERAL: PAINLEVEL: MILD PAIN (3)

## 2023-06-01 NOTE — PATIENT INSTRUCTIONS
[facility]  Post Procedure Instructions    Today you had:  trigger point injections      Medications used:  lidocaine   bupivacaine   kenalog         Go to the emergency room if you develop any shortness of breath  Monitor the injection sites for signs and symptoms of infection-fever, chills, redness, swelling, warmth, or drainage to areas.  You may have soreness at injection sites for up to 24 hours.  It may take up to 14 days for the steroid medication to start working although you may feel the effect as early as a few days after the procedure.     You may apply ice to the painful areas to help minimize the discomfort of the needle pokes.  Do not apply heat to sites for at least 12 hours.  You may use anti-inflammatory medications or Tylenol for pain control if necessary    Pain Clinic phone number during work hours (Monday through Friday 8 am-4:30 pm) at [phone] or the Provider Line after hours at [phone]:

## 2023-06-01 NOTE — PROGRESS NOTES
Pre procedure Diagnosis: myofascial pain   Post procedure Diagnosis: Same  Procedure performed: trigger point injections, CPT code 14292  Anesthesia: none  Complications: none  Operators: Frank Nieves MD    Indications:   Stephenie Menendez is a 37 year old female with a history of myofascial pain.  Exam shows myofascial pain of the muscle groups listed below, and they have tried conservative treatment including medications.    Options/alternatives, benefits and risks were discussed with the patient including bleeding, infection, tissue trauma and pnuemothorax.  Questions were answered to her satisfaction and she agrees to proceed. Voluntary informed consent was obtained and signed.     Vitals were reviewed: Yes  Allergies were reviewed:  Yes   Medications were reviewed:  Yes   Pre-procedure pain score: 3/10    Procedure:  After getting informed consent, a Pause for the Cause was performed.    Trigger points were identified by patient, and marked when appropriate.  The area was prepped with Chloroprep.    Using clean technique, injections were completed using a 25G, 1.5 inch needle.  After negative aspiration, injection was completed.  A total of 10 locations were injected.  When possible, tissue was retracted from the chest wall to avoid lung injury.    Muscle groups injected:  Bilateral cervical paraspinals  Bilateral levator scapulae  Bilateral upper trapezius x2  Bilateral thoracic paraspinals    Injection solution contained:  4.5ml of 1% lidocaine, 4.5ml of 0.5% bupivacaine, and 40mg kenalog .    Hemostasis was achieved, the area was cleaned, and bandaids were placed when appropriate.  The patient tolerated the procedure well.    Post-procedure pain score: see flowsheet/10  Follow-up includes:   -repeat prn    Frank Nieves MD  Interventional Pain Medicine  [facility]

## 2023-06-01 NOTE — NURSING NOTE
Patient reports not pregnant or breast feeding. Patent reports not currently taking any antibiotics for active infections.     Clarice Nugent MA  [facility] [place]

## 2023-07-03 ENCOUNTER — OFFICE VISIT (OUTPATIENT)
Dept: FAMILY MEDICINE | Facility: CLINIC | Age: 38
End: 2023-07-03
Payer: COMMERCIAL

## 2023-07-03 VITALS
DIASTOLIC BLOOD PRESSURE: 85 MMHG | WEIGHT: 159 LBS | TEMPERATURE: 98.9 F | OXYGEN SATURATION: 100 % | RESPIRATION RATE: 16 BRPM | HEART RATE: 71 BPM | SYSTOLIC BLOOD PRESSURE: 134 MMHG | BODY MASS INDEX: 24.9 KG/M2

## 2023-07-03 DIAGNOSIS — N93.8 DYSFUNCTIONAL UTERINE BLEEDING: ICD-10-CM

## 2023-07-03 DIAGNOSIS — N76.0 BACTERIAL VAGINOSIS: Primary | ICD-10-CM

## 2023-07-03 DIAGNOSIS — B96.89 BACTERIAL VAGINOSIS: Primary | ICD-10-CM

## 2023-07-03 LAB
CLUE CELLS: PRESENT
HCG UR QL: NEGATIVE
HGB BLD-MCNC: 11.8 G/DL (ref 11.7–15.7)
TRICHOMONAS, WET PREP: ABNORMAL
WBC'S/HIGH POWER FIELD, WET PREP: ABNORMAL
YEAST, WET PREP: ABNORMAL

## 2023-07-03 PROCEDURE — 99214 OFFICE O/P EST MOD 30 MIN: CPT | Performed by: FAMILY MEDICINE

## 2023-07-03 PROCEDURE — 85018 HEMOGLOBIN: CPT | Performed by: FAMILY MEDICINE

## 2023-07-03 PROCEDURE — 36415 COLL VENOUS BLD VENIPUNCTURE: CPT | Performed by: FAMILY MEDICINE

## 2023-07-03 PROCEDURE — 81025 URINE PREGNANCY TEST: CPT | Performed by: FAMILY MEDICINE

## 2023-07-03 PROCEDURE — 87210 SMEAR WET MOUNT SALINE/INK: CPT | Performed by: FAMILY MEDICINE

## 2023-07-03 RX ORDER — METRONIDAZOLE 500 MG/1
500 TABLET ORAL 2 TIMES DAILY
Qty: 14 TABLET | Refills: 0 | Status: SHIPPED | OUTPATIENT
Start: 2023-07-03 | End: 2023-07-10

## 2023-07-03 RX ORDER — MEDROXYPROGESTERONE ACETATE 10 MG
10 TABLET ORAL DAILY
Qty: 10 TABLET | Refills: 0 | Status: SHIPPED | OUTPATIENT
Start: 2023-07-03 | End: 2023-07-13

## 2023-07-03 NOTE — PROGRESS NOTES
OUTPATIENT VISIT NOTE                                                   Date of Visit: 7/3/2023     Chief Complaint   Patient presents with:  Vaginal Problem: Has had period since June 20th now has vaginal discharge odor and pain            History of Present Illness   Stephenie Menendez is a 37 year old female c/o vaginal odor, discomfort and pressure.  Has been having pain with intercourse.  Had uterine polyp removed in January.  Tubes tied.  Periods have been irregular since January  LMP started 6/23 and hasnt stopped.         MEDICATIONS   Current Outpatient Medications   Medication     cyclobenzaprine (FLEXERIL) 10 MG tablet     diclofenac (VOLTAREN) 1 % topical gel     medroxyPROGESTERone (PROVERA) 10 MG tablet     meloxicam (MOBIC) 7.5 MG tablet     metroNIDAZOLE (FLAGYL) 500 MG tablet     No current facility-administered medications for this visit.         SOCIAL HISTORY   Social History     Tobacco Use     Smoking status: Never     Smokeless tobacco: Never   Substance Use Topics     Alcohol use: Not Currently     Comment: Alcoholic Drinks/day: socially            Physical Exam   Vitals:    07/03/23 1126   BP: 134/85   Pulse: 71   Resp: 16   Temp: 98.9  F (37.2  C)   TempSrc: Oral   SpO2: 100%   Weight: 72.1 kg (159 lb)      GEN: NAD  ABDOMEN: Normal BS,  Soft and nontender,  No masses.  No CVA tenderness.  PELVIC:    External genitalia: Normal  Vagina: Moderate blood  Cervix: No lesions. No cervical motion tenderness  Uterus:  Small, slightly tender.  Adnexae:  Without masses or tenderness.      Diagnostic Studies   LABS:  Results for orders placed or performed in visit on 07/03/23   HCG qualitative urine     Status: Normal   Result Value Ref Range    hCG Urine Qualitative Negative Negative   Hemoglobin     Status: Normal   Result Value Ref Range    Hemoglobin 11.8 11.7 - 15.7 g/dL   Wet prep - Clinic Collect     Status: Abnormal    Specimen: Vagina; Swab   Result Value Ref Range    Trichomonas Absent  Absent    Yeast Absent Absent    Clue Cells Present (A) Absent    WBCs/high power field 1+ (A) None            Assessment and Plan     Bacterial vaginosis  Metronidazole as directed  - Wet prep - Clinic Collect  - metroNIDAZOLE (FLAGYL) 500 MG tablet  Dispense: 14 tablet; Refill: 0    Dysfunctional uterine bleeding  Normal hgb  Provera as directed.  Follow up with gyn.  - HCG qualitative urine  - Hemoglobin  - medroxyPROGESTERone (PROVERA) 10 MG tablet  Dispense: 10 tablet; Refill: 0                   Discussed signs / symptoms that warrant urgent / emergent medical attention.     Recheck if worsening or not improving.       Dorina Melendez MD          Pertinent History     The following portions of the patient's history were reviewed and updated as appropriate: allergies, current medications, past family history, past medical history, past social history, past surgical history and problem list.

## 2023-07-03 NOTE — LETTER
July 3, 2023      Stephenie Menendez  [address] [place] [address]        To Whom It May Concern:    Stephenie Menendez  was seen on 7/3/2023 .          Sincerely,        Dorina Melendez MD

## 2023-07-05 ENCOUNTER — PATIENT OUTREACH (OUTPATIENT)
Dept: CARE COORDINATION | Facility: CLINIC | Age: 38
End: 2023-07-05
Payer: COMMERCIAL

## 2023-07-06 ENCOUNTER — TRANSFERRED RECORDS (OUTPATIENT)
Dept: HEALTH INFORMATION MANAGEMENT | Facility: CLINIC | Age: 38
End: 2023-07-06

## 2023-07-14 ENCOUNTER — PATIENT OUTREACH (OUTPATIENT)
Dept: FAMILY MEDICINE | Facility: CLINIC | Age: 38
End: 2023-07-14
Payer: COMMERCIAL

## 2023-07-19 ENCOUNTER — PATIENT OUTREACH (OUTPATIENT)
Dept: CARE COORDINATION | Facility: CLINIC | Age: 38
End: 2023-07-19
Payer: COMMERCIAL

## 2023-07-26 DIAGNOSIS — Z79.899 ENCOUNTER FOR LONG-TERM (CURRENT) USE OF MEDICATIONS: ICD-10-CM

## 2023-07-26 RX ORDER — CYCLOBENZAPRINE HCL 10 MG
TABLET ORAL
Qty: 90 TABLET | Refills: 3 | Status: SHIPPED | OUTPATIENT
Start: 2023-07-26

## 2023-09-12 NOTE — TELEPHONE ENCOUNTER
Mayco Matthews,   Patient is lost to pap tracking follow-up. Attempts to contact pt have been made per reminder process and there has been no reply and/or no appt scheduled.  If you are wanting any additional contact attempts please send to your care team staff.    Pap Hx:  4/2/14 NIL  11/28/18 ASCUS, neg HPV  5/26/21 ASCUS, neg HPV. Plan: cotest in 1 year  6/4/21 Mychart results sent / mychart read  07/1/22 Lost to follow-up for pap tracking, fyi routed to provider  08/3/22 NIL Pap, Neg HR HPV Plan cotest in 1 year due 08/3/23   07/14/23 Reminder Mychart  08/14/23 Reminder call-lm  09/12/23 Lost to follow-up for pap tracking, fyi routed to provider

## 2023-09-14 ENCOUNTER — TRANSFERRED RECORDS (OUTPATIENT)
Dept: HEALTH INFORMATION MANAGEMENT | Facility: CLINIC | Age: 38
End: 2023-09-14
Payer: COMMERCIAL

## 2023-09-24 ENCOUNTER — MYC MEDICAL ADVICE (OUTPATIENT)
Dept: FAMILY MEDICINE | Facility: CLINIC | Age: 38
End: 2023-09-24
Payer: COMMERCIAL

## 2023-09-27 NOTE — TELEPHONE ENCOUNTER
See separate MyChart encounter. Patient had PAP done outside [facility]. Waiting on OLLIE request to abstract.

## 2023-09-30 ENCOUNTER — HEALTH MAINTENANCE LETTER (OUTPATIENT)
Age: 38
End: 2023-09-30

## 2023-10-11 NOTE — TELEPHONE ENCOUNTER
Call placed to [facility]. Referral is still active so patient does not need to sign OLLIE due to continuing care through specialty. Records will be faxed to clinic and sent to scanning.

## 2023-10-29 DIAGNOSIS — Z79.899 ENCOUNTER FOR LONG-TERM (CURRENT) USE OF MEDICATIONS: ICD-10-CM

## 2023-10-31 RX ORDER — MELOXICAM 7.5 MG/1
7.5 TABLET ORAL DAILY
Qty: 90 TABLET | Refills: 3 | Status: SHIPPED | OUTPATIENT
Start: 2023-10-31 | End: 2024-09-26

## 2023-11-04 ENCOUNTER — VIRTUAL VISIT (OUTPATIENT)
Dept: URGENT CARE | Facility: CLINIC | Age: 38
End: 2023-11-04
Payer: COMMERCIAL

## 2023-11-04 DIAGNOSIS — U07.1 SARS-COV-2 POSITIVE: Primary | ICD-10-CM

## 2023-11-04 PROCEDURE — 99441 PR PHYSICIAN TELEPHONE EVALUATION 5-10 MIN: CPT

## 2023-11-04 RX ORDER — BENZONATATE 100 MG/1
100 CAPSULE ORAL 3 TIMES DAILY PRN
Qty: 30 CAPSULE | Refills: 0 | Status: SHIPPED | OUTPATIENT
Start: 2023-11-04 | End: 2023-12-14

## 2023-11-04 RX ORDER — TRETINOIN 0.25 MG/G
CREAM TOPICAL
COMMUNITY
Start: 2023-04-26 | End: 2024-09-26

## 2023-11-04 NOTE — PROGRESS NOTES
"Assessment:    SARS-CoV-2 positive    COVID-19 positive patient.  Encounter for consideration of medication intervention.    Patient does qualify for a prescription.   Full discussion with patient including medication options, risks and benefits.   Potential drug interactions reviewed with patient.      Plan:  Treatment planned   Paxlovid will be sent in to preferred pharmacy.        Batsheva Casiano  is a 37 year old  who has a confirmed new positive COVID-19 diagnosis.      She has been identified as high risk for complications of this infection, and is being evaluated via a billable     Phone visit.      Phone call duration: 9 minutes  Patient location: Home  Provider location: Home    Concern for COVID-19  When did symptoms begin? 11/3/23    Positive COVID test? Yes    Symptoms (Cough, trouble breathing, fever, chills, headache, sore throat, chest pain, diarrhea, body aches)?  Tired, head ache, cough, congestion, sore throat, body aches         Constitutional, HEENT, cardiovascular, pulmonary, gi and gu systems are negative, except as otherwise noted.    Objective    Vitals:  No vitals were obtained today due to virtual visit.  Estimated body mass index is 24.9 kg/m  as calculated from the following:    Height as of 10/10/22: 1.702 m (5' 7\").    Weight as of 7/3/23: 72.1 kg (159 lb).   General: Alert, no apparent distress  PSYCH: Alert; coherent speech, normal rate and volume, able to articulate logical thoughts, appropriate insight, no tangential thoughts, no hallucination or delusions.  Affect is appropriate  RESP: No cough, no audible wheezing, able to talk in full sentences  Remainder of exam unable to be completed due to telephone visit  GFR Estimate   Date Value Ref Range Status   11/15/2022 >90 >60 mL/min/1.73m2 Final     Comment:     Effective December 21, 2021 eGFRcr in adults is calculated using the 2021 CKD-EPI creatinine equation which includes age and gender (Olive et al., NEJ, DOI: " "10.1056/[id number])   05/28/2021 >60 >60 mL/min/1.73m2 Final              The patient has been notified of following:     \"This telephone visit will be conducted via a call between you and your physician/provider. We have found that certain health care needs can be provided without the need for a physical exam.  This service lets us provide the care you need with a short phone conversation.  If a prescription is necessary we can send it directly to your pharmacy.  If lab work is needed we can place an order for that and you can then stop by our lab to have the test done at a later time.    Telephone visits are billed at different rates depending on your insurance coverage. During this emergency period, for some insurers they may be billed the same as an in-person visit.  Please reach out to your insurance provider with any questions.    If during the course of the call the physician/provider feels a telephone visit is not appropriate, you will not be charged for this service.\"    Patient has given verbal consent for Telephone visit?  Yes    "

## 2023-12-14 ENCOUNTER — OFFICE VISIT (OUTPATIENT)
Dept: PALLIATIVE MEDICINE | Facility: OTHER | Age: 38
End: 2023-12-14
Attending: STUDENT IN AN ORGANIZED HEALTH CARE EDUCATION/TRAINING PROGRAM
Payer: COMMERCIAL

## 2023-12-14 VITALS — SYSTOLIC BLOOD PRESSURE: 136 MMHG | HEART RATE: 92 BPM | DIASTOLIC BLOOD PRESSURE: 72 MMHG

## 2023-12-14 DIAGNOSIS — G89.4 CHRONIC PAIN SYNDROME: ICD-10-CM

## 2023-12-14 DIAGNOSIS — M79.7 FIBROMYALGIA: Primary | ICD-10-CM

## 2023-12-14 PROCEDURE — 99214 OFFICE O/P EST MOD 30 MIN: CPT | Performed by: STUDENT IN AN ORGANIZED HEALTH CARE EDUCATION/TRAINING PROGRAM

## 2023-12-14 PROCEDURE — 99213 OFFICE O/P EST LOW 20 MIN: CPT | Performed by: STUDENT IN AN ORGANIZED HEALTH CARE EDUCATION/TRAINING PROGRAM

## 2023-12-14 ASSESSMENT — PAIN SCALES - GENERAL: PAINLEVEL: SEVERE PAIN (6)

## 2023-12-14 NOTE — PROGRESS NOTES
[facility] Follow-up    Date of visit: 12/14/2023    Chief complaint:   Chief Complaint   Patient presents with    Neck Pain     Interval history:  Since her last visit, Stephenie Menendez reports:  - Overall feels like she is doing ok. Pain is stable.   - TPI performed again 6/1/23. These did not seem to help as well as the first round. She reports maybe 3 weeks to 1 month of pain relief only. Does not feel that she is interested in trying these at the present moment.   - Started a gym membership over the summer and was doing well, lost about 20lbs. Has not been able to keep this up as well over the summer. Not as active as she would like anymore.    Pain scores:  Pain intensity currently is 6 on a scale of 0-10.     Current pain medications include:  Flexeril 10mg nightly - helpful for pain and gets a full night's sleep  Mobic nightly - helpful to decrease pain numbers  Tylenol PRN     Previous medication treatments included:  Tizanidine - affected LFTs  Ibuprofen  Gabapentin - NH and developed migraines  Tylenol  Duloxetine - allergic hives, SOB  Lyrica- NH and made drowsy     Other treatments have included:  Stephenie QIU Shon has not been seen at a pain clinic in the past.   PT: has gone previously, continues to do HEP  Injections:               Left knee CSI - swelled up for 3 days but then did help  Surgery: none  Alternative: chiro - helpful for a few days.    Side Effects: N/A    Medications:  Current Outpatient Medications   Medication Sig Dispense Refill    cyclobenzaprine (FLEXERIL) 10 MG tablet TAKE 1 TABLET BY MOUTH THREE TIMES A DAY AS NEEDED FOR MUSCLE SPASMS 90 tablet 3    diclofenac (VOLTAREN) 1 % topical gel Apply 2 g topically 4 times daily as needed for moderate pain 350 g 2    meloxicam (MOBIC) 7.5 MG tablet TAKE 1 TABLET BY MOUTH EVERY DAY 90 tablet 3    tretinoin (RETIN-A) 0.025 % external cream PLEASE SEE ATTACHED FOR DETAILED  DIRECTIONS         Medical History: any changes in medical history since they were last seen? No    Physical Exam:  Blood pressure 136/72, pulse 92, not currently breastfeeding.  Constitutional: Well developed, NAD  Head: normocephalic. Atraumatic.   Eyes: no redness or jaundice noted   CV: warm, well perfused extremities   Skin: no suspicious lesions or rashes   Psychiatric: mentation appears normal and affect full    Assessment:   Fibromyalgia  Myofascial pain syndrome  Chronic pain syndrome     Stephenie Menendez is a 38 year old female who presents with several years of diffuse joint and muscle pains.  Patient has previously seen rheumatology who made the diagnosis of fibromyalgia.  Based on examination today a myofascial pain syndrome or fibromyalgia would be an appropriate diagnosis.  Patient has tried multiple conservative therapies including physical therapy, cyclobenzaprine, meloxicam, all of which have some positive effect.  We performed trigger point injections which did give her significant relief on the first round, was not as helpful subsequently. Discussed that this does happen and that it may be worth repeating in the future though she is not interested in trying again at this time. In general her pain is stable and she is overall doing ok. We discussed pain psychology referral in the future though she was not interested in a referral at present moment.      Plan:  Diagnosis reviewed, treatment option addressed, and risk/benefits discussed.      Physical Therapy: Continue HEP.  Diagnostic Studies: none  Medication Management:   Continue meloxicam, cyclobenzaprine  Procedures:   Bilateral cervical/trapezius trigger point injection can be repeated PRN, she will reach out if she is interested in these again.   Pain Psychologist to address issues of relaxation, behavioral change, coping style, and other factors important to improvement: potentially in future- this was discussed today, she will reach out  if interested.   Follow up: As needed    Ildefonso Vergara MD  Pain Fellow Physician  [facility]     Physician Attestation   I, Frank Nieves MD, saw and evaluated this patient and agree with the findings and plan of care as documented in the note.  Any changes have been made above.    Frank Nieves MD  Interventional Pain Medicine  [facility]

## 2023-12-14 NOTE — PATIENT INSTRUCTIONS
You were seen today in follow up of fibromyalgia. The plan we discussed is as below:   Physical Therapy: Continue home exercise  Diagnostic Studies: none today  Medication Management:   Continue meloxicam, cyclobenzaprine as per PCP  Procedures:   Bilateral cervical/trapezius trigger point injection can be repeated as needed, please reach out if you are interested in these again.   We also discussed seeing a Pain Psychologist to address issues of relaxation, behavioral change, coping style, and other factors important to improvement: potentially in future- please reach out if interested.   Follow up: As needed    Frank Nieves MD  Interventional Pain Medicine  [facility] Number:  [phone]  Call with any questions about your care and for scheduling assistance.   Calls are returned Monday through Friday between 8 AM and 4:30 PM. We usually get back to you within 2 business days depending on the issue/request.    If we are prescribing your medications:  For opioid medication refills, call the clinic or send a [facility] message 7 days in advance.  Please include:  Name of requested medication  Name of the pharmacy.  For non-opioid medications, call your pharmacy directly to request a refill. Please allow 3-4 days to be processed.   Per [place] State Law:  All controlled substance prescriptions must be filled within 30 days of being written.    For those controlled substances allowing refills, pickup must occur within 30 days of last fill.      We believe regular attendance is key to your success in our program!    Any time you are unable to keep your appointment we ask that you call us at least 24 hours in advance to cancel.This will allow us to offer the appointment time to another patient.   Multiple missed appointments may lead to dismissal from the clinic.

## 2023-12-14 NOTE — PROGRESS NOTES
4/18/2023     1:15 PM 4/18/2023     1:16 PM 12/14/2023     3:58 PM   PEG Score   PEG Total Score 5 5 6

## 2024-09-05 ENCOUNTER — TELEPHONE (OUTPATIENT)
Dept: PALLIATIVE MEDICINE | Facility: OTHER | Age: 39
End: 2024-09-05

## 2024-09-05 NOTE — TELEPHONE ENCOUNTER
Voice message left on 9/4/2024 at 5:03 PM    Reason for Call:  Other call back    Detailed comments: Patient called to schedule an injection at the pain clinic - stated that she sees seen Dr. Nieves - and noted that she had just scheduled a f/u visit to see Dr. Nieves and was hoping to have the injection done at the same time if at all possible    Requests to be called back.    Phone Number Patient can be reached at: Cell number on file:    Telephone Information:   Mobile [phone]       Best Time: not indicated in voice message    Can we leave a detailed message on this number?  not indicated in voice message    Voice message retrieved on 9/5/2024 at 7:53 AM by Na Lyons

## 2024-09-26 ENCOUNTER — OFFICE VISIT (OUTPATIENT)
Dept: PALLIATIVE MEDICINE | Facility: OTHER | Age: 39
End: 2024-09-26
Attending: STUDENT IN AN ORGANIZED HEALTH CARE EDUCATION/TRAINING PROGRAM
Payer: COMMERCIAL

## 2024-09-26 VITALS — OXYGEN SATURATION: 100 % | SYSTOLIC BLOOD PRESSURE: 125 MMHG | HEART RATE: 84 BPM | DIASTOLIC BLOOD PRESSURE: 76 MMHG

## 2024-09-26 DIAGNOSIS — G89.4 CHRONIC PAIN SYNDROME: ICD-10-CM

## 2024-09-26 DIAGNOSIS — M79.7 FIBROMYALGIA: ICD-10-CM

## 2024-09-26 DIAGNOSIS — Z79.899 ENCOUNTER FOR LONG-TERM (CURRENT) USE OF MEDICATIONS: ICD-10-CM

## 2024-09-26 DIAGNOSIS — M79.18 MYOFASCIAL PAIN: Primary | ICD-10-CM

## 2024-09-26 PROCEDURE — 99214 OFFICE O/P EST MOD 30 MIN: CPT | Mod: 25 | Performed by: STUDENT IN AN ORGANIZED HEALTH CARE EDUCATION/TRAINING PROGRAM

## 2024-09-26 PROCEDURE — 250N000011 HC RX IP 250 OP 636: Performed by: STUDENT IN AN ORGANIZED HEALTH CARE EDUCATION/TRAINING PROGRAM

## 2024-09-26 PROCEDURE — 20553 NJX 1/MLT TRIGGER POINTS 3/>: CPT | Performed by: STUDENT IN AN ORGANIZED HEALTH CARE EDUCATION/TRAINING PROGRAM

## 2024-09-26 PROCEDURE — 99213 OFFICE O/P EST LOW 20 MIN: CPT | Performed by: STUDENT IN AN ORGANIZED HEALTH CARE EDUCATION/TRAINING PROGRAM

## 2024-09-26 PROCEDURE — 250N000009 HC RX 250: Performed by: STUDENT IN AN ORGANIZED HEALTH CARE EDUCATION/TRAINING PROGRAM

## 2024-09-26 RX ORDER — BUPIVACAINE HYDROCHLORIDE 5 MG/ML
5 INJECTION, SOLUTION EPIDURAL; INTRACAUDAL ONCE
Status: COMPLETED | OUTPATIENT
Start: 2024-09-26 | End: 2024-09-26

## 2024-09-26 RX ORDER — LIDOCAINE HYDROCHLORIDE 10 MG/ML
5 INJECTION, SOLUTION EPIDURAL; INFILTRATION; INTRACAUDAL; PERINEURAL ONCE
Status: COMPLETED | OUTPATIENT
Start: 2024-09-26 | End: 2024-09-26

## 2024-09-26 RX ORDER — MELOXICAM 7.5 MG/1
7.5 TABLET ORAL DAILY
Qty: 90 TABLET | Refills: 3 | Status: SHIPPED | OUTPATIENT
Start: 2024-09-26

## 2024-09-26 RX ORDER — CYCLOBENZAPRINE HCL 5 MG
5 TABLET ORAL 3 TIMES DAILY PRN
Qty: 270 TABLET | Refills: 1 | Status: SHIPPED | OUTPATIENT
Start: 2024-09-26

## 2024-09-26 RX ADMIN — BUPIVACAINE HYDROCHLORIDE 25 MG: 5 INJECTION, SOLUTION EPIDURAL; INTRACAUDAL; PERINEURAL at 15:56

## 2024-09-26 RX ADMIN — LIDOCAINE HYDROCHLORIDE 5 ML: 10 INJECTION, SOLUTION EPIDURAL; INFILTRATION; INTRACAUDAL; PERINEURAL at 15:56

## 2024-09-26 ASSESSMENT — PAIN SCALES - GENERAL
PAINLEVEL: MODERATE PAIN (5)
PAINLEVEL: MODERATE PAIN (5)

## 2024-09-26 NOTE — PROGRESS NOTES
"Patient presents to the clinic today for a visit with Frank Nieves MD regarding Pain Management.          4/18/2023     1:16 PM 12/14/2023     3:58 PM 9/26/2024     3:26 PM   PEG Score   PEG Total Score 5 6 7       UDS/CSA-     Medications-     Notes Worse lately. Degenerative disks in neck. Suspects rupture because for two days she had \"out of my mind pain\"     Hands, knees, hips and feet are also worse lately.     Denice Christo  [facility] Clinical AssistantPatient presents to the clinic today for a visit with Frank Nieves MD regarding Pain Management.    Pre-procedure Intake    If YES to any questions or NO to having a     Notify nurses and provider    Do you take aspirin or use aspirin products?  No  If cervical procedure or interlaminar, have you held aspirin for 6 days?   NA    Do you have any allergies to contrast dye, iodine, steroid and/or numbing medications?  NO    Are you currently taking antibiotics or have an active infection?  NO    Have you had a fever/elevated temperature within the past week? NO    Have you had a vaccination of any kind in the last seven days or a Covid vaccine in the last two weeks NO    Do you have a ? NA    Are you pregnant or breastfeeding?  NO      Notify provider and RNs if systolic BP >170, diastolic BP >100, P >100 or O2 sats < 90%      "

## 2024-09-26 NOTE — PATIENT INSTRUCTIONS
Physical Therapy: Continue HEP.  Diagnostic Studies: none  Medication Management:   Restart meloxicam 7.5mg daily as needed  Restart cyclobenzaprine 5mg 3 times daily as needed - ok to do 10mg if needed but try 5mg  Consider methocarbamol in future  Procedures:   Will do TPI in clinic based on presenting symptoms  Pain Psychologist to address issues of relaxation, behavioral change, coping style, and other factors important to improvement: potentially in future- this was discussed previously, she will reach out if interested.   Follow up: 3 months    [facility]  Post Procedure Instructions      Today you saw:  Dr. Nieves      Today you had:  trigger point injections                          Medications used:  lidocaine   bupivicaine             Go to the emergency room if you develop any shortness of breath      Monitor the injection sites for signs and symptoms of infection-fever, chills, redness, swelling, warmth, or drainage to areas.      You may have soreness at injection sites for up to 24 hours.      You may apply ice to the painful areas to help minimize the discomfort of the needle pokes.      Do not apply heat to sites for at least 12 hours.      You may use anti-inflammatory medications or Tylenol for pain control if necessary       Pain Clinic phone number:  [phone]

## 2024-09-26 NOTE — PROGRESS NOTES
[facility] Follow-up    Date of visit: 9/26/2024    Chief complaint:   Chief Complaint   Patient presents with    Pain       Interval history:  Since her last visit, Stephenie Menendez reports:    Some numbness in left hand  Muscles of left neck also feeling numb but with pain    Used to see rheum but after her rheumatologist left was unable to re-establish.    Pain scores:  Pain intensity currently is 5 on a scale of 0-10.     Current pain medications include:  Tylenol PRN - not helping enough  Ibuprofen - Not helping enough     Previous medication treatments included:  Flexeril 10mg nightly - helpful for pain and gets a full night's sleep  Mobic nightly - helpful to decrease pain numbers  Tizanidine - affected LFTs  Ibuprofen  Gabapentin - NH and developed migraines  Tylenol  Duloxetine - allergic hives, SOB  Lyrica - NH and made drowsy     Other treatments have included:  Stephenie Menendez has not been seen at a pain clinic in the past.   PT: has gone previously, continues to do HEP  Injections:               Left knee CSI - swelled up for 3 days but then did help  Surgery: none  Alternative: chiro - helpful for a few days.    Side Effects: no side effect    Medications:  Current Outpatient Medications   Medication Sig Dispense Refill    cyclobenzaprine (FLEXERIL) 10 MG tablet TAKE 1 TABLET BY MOUTH THREE TIMES A DAY AS NEEDED FOR MUSCLE SPASMS 90 tablet 3    diclofenac (VOLTAREN) 1 % topical gel Apply 2 g topically 4 times daily as needed for moderate pain 350 g 2    meloxicam (MOBIC) 7.5 MG tablet TAKE 1 TABLET BY MOUTH EVERY DAY 90 tablet 3       Medical History: any changes in medical history since they were last seen? No    Physical Exam:  Blood pressure 125/76, pulse 84, SpO2 100%, not currently breastfeeding.  Constitutional: Well developed, NAD  Head: normocephalic. Atraumatic.   Eyes: no redness or jaundice noted   CV: warm, well perfused  extremities   Skin: no suspicious lesions or rashes   Psychiatric: mentation appears normal and affect full    Diagnostics:  none    Personally reviewed: N/A    Assessment:   Fibromyalgia  Myofascial pain syndrome  Chronic pain syndrome     Stephenie Menendez is a 38 year old female who presents with several years of diffuse joint and muscle pains.  Patient has previously seen rheumatology who made the diagnosis of fibromyalgia.  Based on examination today a myofascial pain syndrome or fibromyalgia would be an appropriate diagnosis.  Patient has tried multiple conservative therapies including physical therapy, cyclobenzaprine, meloxicam, all of which have some positive effect.  We performed trigger point injections which did give her significant relief on the first round, was not as helpful subsequently. Discussed that this does happen and that it may be worth repeating in the future though she is not interested in trying again at this time. In general her pain is stable and she is overall doing ok. We discussed pain psychology referral in the future though she was not interested in a referral at present moment.      Plan:  Diagnosis reviewed, treatment option addressed, and risk/benefits discussed.      Physical Therapy: Continue HEP.  Diagnostic Studies: none  Medication Management:   Restart meloxicam 7.5mg daily as needed  Restart cyclobenzaprine 5mg 3 times daily as needed - ok to do 10mg if needed but try 5mg  Consider methocarbamol in future  Procedures:   Will do TPI in clinic based on presenting symptoms, see below for note  Pain Psychologist to address issues of relaxation, behavioral change, coping style, and other factors important to improvement: potentially in future- this was discussed previously, she will reach out if interested.   Follow up: 3 months    Frank Nieves MD  Interventional Pain Medicine  [facility]    S: Patient endorses ongoing pain in bilateral neck, upper  shoulders, upper back  O: /76   Pulse 84   SpO2 100%    MSK - +TTP at following:  Bilateral cervical paraspinals  Bilateral levator scapulae  Bilateral upper traps  Bilateral thoracic paraspinals  A/P:  Myofascial pain: proceed with trigger point injections today in clinic    Pre procedure Diagnosis: myofascial pain   Post procedure Diagnosis: Same  Procedure performed: trigger point injections, CPT code 25292  Anesthesia: none  Complications: none  Operators: Frank Nieves MD    Indications:   Stephenie Menendez is a 38 year old female with a history of myofascial pain.  Exam shows myofascial pain of the muscle groups listed below, and they have tried conservative treatment including medications.    Options/alternatives, benefits and risks were discussed with the patient including bleeding, infection, tissue trauma and pneumothorax.  Questions were answered to her satisfaction and she agrees to proceed. Voluntary informed consent was obtained and signed.     Vitals were reviewed: Yes  Allergies were reviewed:  Yes   Medications were reviewed:  Yes   Pre-procedure pain score: 5/10    Procedure:  After getting informed consent, a Pause for the Cause was performed.    Trigger points were identified by patient, and marked when appropriate.  The area was prepped with Chloroprep.    Using clean technique, injections were completed using a 25G, 1.5 inch needle.  After negative aspiration, injection was completed.  A total of 10 locations were injected.  When possible, tissue was retracted from the chest wall to avoid lung injury.    Muscle groups injected:  Bilateral cervical paraspinals x2  Bilateral levator scapulae x1  Bilateral upper traps x1  Bilateral thoracic paraspinals x1    Injection solution contained:  5ml of 1% lidocaine and 5ml of 0.5% bupivacaine.    Hemostasis was achieved, the area was cleaned, and bandaids were placed when appropriate.  The patient tolerated the procedure  well.    Post-procedure pain score: see flowsheet/10  Follow-up includes:   -repeat logan Nieves MD  Interventional Pain Medicine  [facility]

## 2024-11-01 ENCOUNTER — TRANSFERRED RECORDS (OUTPATIENT)
Dept: HEALTH INFORMATION MANAGEMENT | Facility: CLINIC | Age: 39
End: 2024-11-01
Payer: COMMERCIAL

## 2024-11-15 ENCOUNTER — TRANSFERRED RECORDS (OUTPATIENT)
Dept: HEALTH INFORMATION MANAGEMENT | Facility: CLINIC | Age: 39
End: 2024-11-15
Payer: COMMERCIAL

## 2024-11-17 ENCOUNTER — HEALTH MAINTENANCE LETTER (OUTPATIENT)
Age: 39
End: 2024-11-17

## 2024-12-17 ENCOUNTER — OFFICE VISIT (OUTPATIENT)
Dept: PALLIATIVE MEDICINE | Facility: OTHER | Age: 39
End: 2024-12-17
Attending: STUDENT IN AN ORGANIZED HEALTH CARE EDUCATION/TRAINING PROGRAM
Payer: COMMERCIAL

## 2024-12-17 VITALS — HEART RATE: 85 BPM | SYSTOLIC BLOOD PRESSURE: 139 MMHG | OXYGEN SATURATION: 99 % | DIASTOLIC BLOOD PRESSURE: 65 MMHG

## 2024-12-17 DIAGNOSIS — G89.4 CHRONIC PAIN SYNDROME: ICD-10-CM

## 2024-12-17 DIAGNOSIS — M54.12 CERVICAL RADICULOPATHY: Primary | ICD-10-CM

## 2024-12-17 DIAGNOSIS — M79.18 MYOFASCIAL PAIN: ICD-10-CM

## 2024-12-17 DIAGNOSIS — Z79.899 ENCOUNTER FOR LONG-TERM (CURRENT) USE OF MEDICATIONS: ICD-10-CM

## 2024-12-17 DIAGNOSIS — M79.7 FIBROMYALGIA: ICD-10-CM

## 2024-12-17 PROCEDURE — 99213 OFFICE O/P EST LOW 20 MIN: CPT | Performed by: STUDENT IN AN ORGANIZED HEALTH CARE EDUCATION/TRAINING PROGRAM

## 2024-12-17 RX ORDER — CYCLOBENZAPRINE HCL 10 MG
10 TABLET ORAL 3 TIMES DAILY PRN
Qty: 90 TABLET | Refills: 3 | Status: SHIPPED | OUTPATIENT
Start: 2024-12-17

## 2024-12-17 RX ORDER — CELECOXIB 100 MG/1
100 CAPSULE ORAL 2 TIMES DAILY
Qty: 180 CAPSULE | Refills: 1 | Status: SHIPPED | OUTPATIENT
Start: 2024-12-17

## 2024-12-17 ASSESSMENT — PAIN SCALES - GENERAL: PAINLEVEL_OUTOF10: MODERATE PAIN (4)

## 2024-12-17 NOTE — PROGRESS NOTES
[facility] Follow-up    Date of visit: 12/17/2024    Chief complaint:   Chief Complaint   Patient presents with    Pain Management    Pain       Interval history:  Since her last visit, Stephenie M Shon reports:  - Trigger point injections were not helpful for neck pain   - Patient reports continued bilateral neck pain with radicular pain down the right upper extremity into the 1st-3rd digits. There is also associated numbness and tingling in the 1st-3rd digits on the right. She also reports intermittent radicular pain down the left arm.   - She had a cervical MRI at [facility] and was evaluated at [facility]. She underwent cervical epidural one week ago at [facility] and started to noticed some reduction in pain 2 days ago.        Pain scores:  Pain intensity currently is 4 on a scale of 0-10.     Current pain medications include:  Meloxicam 7.5mg daily as needed -Helpful 20%   Cyclobenzaprine 5mg 3 times daily as needed - Helpful 40%      Previous medication treatments included:  Tizanidine - affected LFTs  Ibuprofen  Gabapentin - NH and developed migraines  Tylenol  Duloxetine - allergic hives, SOB  Lyrica - NH and made drowsy     Other treatments have included:  Stephenie Menendez has not been seen at a pain clinic in the past.   PT: has gone previously, continues to do HEP  Injections:    Cervical FLORINDA on 12/9/24 at [facility]               Left knee CSI - swelled up for 3 days but then did help  Surgery: none  Alternative: chiro - helpful for a few days.  Massage -Helpful with muscle pains        Side Effects: no side effect    Medications:  Current Outpatient Medications   Medication Sig Dispense Refill    cyclobenzaprine (FLEXERIL) 10 MG tablet TAKE 1 TABLET BY MOUTH THREE TIMES A DAY AS NEEDED FOR MUSCLE SPASMS 90 tablet 3    cyclobenzaprine (FLEXERIL) 5 MG tablet Take 1 tablet (5 mg) by mouth 3 times daily as needed for muscle spasms. 270 tablet 1    diclofenac (VOLTAREN) 1 %  topical gel Apply 2 g topically 4 times daily as needed for moderate pain 350 g 2    meloxicam (MOBIC) 7.5 MG tablet Take 1 tablet (7.5 mg) by mouth daily. 90 tablet 3       Medical History: any changes in medical history since they were last seen? No      Physical Exam:  Blood pressure 139/65, pulse 85, SpO2 99%, not currently breastfeeding.  Constitutional: Well developed, NAD  Head: normocephalic. Atraumatic.   Eyes: no redness or jaundice noted   CV: warm, well perfused extremities   Skin: no suspicious lesions or rashes   Psychiatric: mentation appears normal and affect full    Focused MSK exam:   Normal 5/5 strength in BUE -difficulty sustaining right elbow extension and bilateral wrist extension contractions  Normal sensation to light touch in the C4-T1 distributions bilaterally   Negative Munguia's bilaterally     Reflexes   Biceps:                 L 2/4, R 2/4   Brachioradialis:    L 2/4, R 2/4    Diagnostics:  EXAM: MR CERVICAL SPINE WITHOUT CONTRAST    CLINICAL INFORMATION: Neck and upper back pain with limp numbness/weakness.    COMPARISON: None. SEDATION: None.    TECHNICAL INFORMATION: Imaging was performed at [facility]. T1, T2 FSE and STIR sagittal thin sections, T2 gradient refocused and T2 FSE axial sections at selected levels and bilateral oblique sagittal T2 images through neural foramina.    INTERPRETATION:    Fat suppressed images are negative for acute or subacute fractures. No high signal cord lesions, normal vertebral artery flow voids and unremarkable craniocervical junction.    C2-3: No disc herniation or central/foraminal stenosis. Chronic left hypertrophic facet degeneration.    C3-4: 2 mm spondylolisthesis, bulge without central stenosis, patent foramina and bilateral facet hypertrophy including left greater than right joint effusions.    C4-5: Bulge abuts dural sac without cord contact, herniation or foraminal stenosis. Hypertrophic facet degeneration.    C5-6: Bulge  indents dural sac without cord contact. Patent foramina and chronic hypertrophic facet degeneration.    C6-7: Type I endplate discogenic marrow and severe disc space narrowing, osteophyte and bulge flatten dural sac with mild central canal narrowing but no cord contact. Right foraminal disc protrusion and uncovertebral osteophyte cause severe foraminal stenosis and C7 impingement. Patent left nerve root canal.    C7-T1: Normal disc margin, facet arthropathy with joint effusions and no neural impingement.    T1-2: Normal disc margin and no spinal stenosis or facet degeneration.    T2-3 and T3-4: Disc protrusions, facet arthropathy and no spinal stenosis or impingement.    CONCLUSION:    1. Disc degeneration and annular bulging at each level C3-4 to C6-7 with dural sac flattening but no cord impingement or high-grade central stenosis.    2. Right foraminal protrusion and uncovertebral osteophyte at C6-7 causes severe stenosis and right C7 impingement.    3. Multilevel hypertrophic facet arthropathy and multilevel facet joint effusions, as reported.    4. Active type I endplate discogenic marrow at C6-7.    Electronically signed on 11/7/2024 4:26:00 PM by Perry Clifton M.D.  Radiologist Signature    Personally reviewed: Images not available    Assessment:     Cervical radiculopathy affecting right C6/7 dermatomes   Fibromyalgia  Myofascial pain syndrome  Chronic pain syndrome     Stephenie Menendez is a 39 year old female who presents with several years of diffuse joint and muscle pains. Patient has previously seen rheumatology who made the diagnosis of fibromyalgia. She has chronic bilateral neck pain with radicular pain down the lateral right upper extremity to the 1st-3rd digits. Her symptoms are consistent with cervical radiculopathy in the right C6-C7 dermatomes. Cervical MRI revealed right foraminal disc protrusion resulting in C7 impingement. She was evaluated at [facility] and underwent cervical epidural steroid  injection on 12/9/24. She is starting to experience a reduction in neck pain and radicular pain. We discussed transitioning Meloxicam to Celebrex 100mg BID as needed. She will continue to use Cyclobenzaprine 5mg TID as needed. We will follow-up in the clinic in 3 months.       Plan:  Diagnosis reviewed, treatment option addressed, and risk/benefits discussed.      Physical Therapy: Continue HEP.  Diagnostic Studies: Reviewed cervical MRI   Medication Management:   Start Celebrex 100mg BID prn   Continue Cyclobenzaprine 5mg TID as needed   Consider Methocarbamol in future  Procedures:   Can continue cervical epidural if continuing to get pain relief   No further trigger point injections at this time   Pain Psychologist to address issues of relaxation, behavioral change, coping style, and other factors important to improvement: potentially in future- this was discussed previously, she will reach out if interested.   Follow up: 3 months       Lynnette Alcala DO  Pain medicine Fellow    Physician Attestation   I, Frank Nieves MD, saw and evaluated this patient and agree with the findings and plan of care as documented in the note.  Any changes have been made above.    Frank Nieves MD  Interventional Pain Medicine  [facility]

## 2024-12-17 NOTE — PROGRESS NOTES
Patient presents to the clinic today for a visit  with Frank Nieves MD            UDS/YAEL-na    Medications-na    QUESTIONS:    Jesi Maza MA  [facility] [place]

## 2024-12-17 NOTE — PATIENT INSTRUCTIONS
Physical Therapy: Continue HEP  Diagnostic Studies: Reviewed cervical MRI   Medication Management:   Start Celebrex 100mg twice a day as needed   Discontinue Meloxicam  Continue Cyclobenzaprine 5mg three times a day as needed   Consider Methocarbamol in future  Procedures:   Can continue cervical epidural if continuing to get pain relief   No further trigger point injections at this time   Pain Psychologist to address issues of relaxation, behavioral change, coping style, and other factors important to improvement: potentially in future- this was discussed previously, she will reach out if interested.   Follow up: 3 months          [facility] Number:  [phone]  Call with any questions about your care and for scheduling assistance.   Calls are returned Monday through Friday between 8 AM and 4:30 PM. We usually get back to you within 2 business days depending on the issue/request.    If we are prescribing your medications:  For opioid medication refills, call the clinic or send a [employer] message 7 days in advance.  Please include:  Name of requested medication  Name of the pharmacy.  For non-opioid medications, call your pharmacy directly to request a refill. Please allow 3-4 days to be processed.   Per [place] State Law:  All controlled substance prescriptions must be filled within 30 days of being written.    For those controlled substances allowing refills, pickup must occur within 30 days of last fill.      We believe regular attendance is key to your success in our program!    Any time you are unable to keep your appointment we ask that you call us at least 24 hours in advance to cancel.This will allow us to offer the appointment time to another patient.   Multiple missed appointments may lead to dismissal from the clinic.

## 2025-01-05 ENCOUNTER — HEALTH MAINTENANCE LETTER (OUTPATIENT)
Age: 40
End: 2025-01-05

## 2025-01-08 ENCOUNTER — TRANSFERRED RECORDS (OUTPATIENT)
Dept: HEALTH INFORMATION MANAGEMENT | Facility: CLINIC | Age: 40
End: 2025-01-08
Payer: COMMERCIAL

## 2025-02-03 ENCOUNTER — TRANSFERRED RECORDS (OUTPATIENT)
Dept: HEALTH INFORMATION MANAGEMENT | Facility: CLINIC | Age: 40
End: 2025-02-03
Payer: COMMERCIAL

## 2025-02-28 ENCOUNTER — TRANSFERRED RECORDS (OUTPATIENT)
Dept: HEALTH INFORMATION MANAGEMENT | Facility: CLINIC | Age: 40
End: 2025-02-28
Payer: COMMERCIAL

## 2025-03-05 ENCOUNTER — TRANSFERRED RECORDS (OUTPATIENT)
Dept: HEALTH INFORMATION MANAGEMENT | Facility: CLINIC | Age: 40
End: 2025-03-05
Payer: COMMERCIAL

## 2025-03-14 PROBLEM — Z00.00 HEALTH CARE MAINTENANCE: Status: ACTIVE | Noted: 2018-11-27

## 2025-03-14 PROBLEM — R00.2 PALPITATIONS: Status: RESOLVED | Noted: 2017-02-16 | Resolved: 2025-03-14

## 2025-03-14 PROBLEM — Q83.1 ACCESSORY BREAST TISSUE OF AXILLA: Status: ACTIVE | Noted: 2025-03-14

## 2025-03-14 PROBLEM — E66.3 OVERWEIGHT WITH BODY MASS INDEX (BMI) OF 26 TO 26.9 IN ADULT: Status: ACTIVE | Noted: 2021-05-26

## 2025-03-14 PROBLEM — R93.5 ABNORMAL ENDOMETRIAL ULTRASOUND: Status: RESOLVED | Noted: 2022-10-14 | Resolved: 2025-03-14

## 2025-03-18 ENCOUNTER — MYC MEDICAL ADVICE (OUTPATIENT)
Dept: FAMILY MEDICINE | Facility: CLINIC | Age: 40
End: 2025-03-18
Payer: COMMERCIAL

## 2025-03-18 ENCOUNTER — TELEPHONE (OUTPATIENT)
Dept: PLASTIC SURGERY | Facility: CLINIC | Age: 40
End: 2025-03-18
Payer: COMMERCIAL

## 2025-03-18 NOTE — TELEPHONE ENCOUNTER
Patient confirmed scheduled appointment:  Date: 05/21/2025  Time: 800 am   Visit type: New Plastic Surgery   Provider:    Location: [facility]   Testing/imaging: n/a  Additional notes: Pt being referred by  for Accessory breast tissue of axilla

## 2025-03-20 ENCOUNTER — OFFICE VISIT (OUTPATIENT)
Dept: PALLIATIVE MEDICINE | Facility: OTHER | Age: 40
End: 2025-03-20
Payer: COMMERCIAL

## 2025-03-20 VITALS — DIASTOLIC BLOOD PRESSURE: 82 MMHG | OXYGEN SATURATION: 100 % | SYSTOLIC BLOOD PRESSURE: 123 MMHG | HEART RATE: 85 BPM

## 2025-03-20 DIAGNOSIS — M54.12 CERVICAL RADICULOPATHY: Primary | ICD-10-CM

## 2025-03-20 DIAGNOSIS — M79.7 FIBROMYALGIA: ICD-10-CM

## 2025-03-20 DIAGNOSIS — M79.18 MYOFASCIAL PAIN: ICD-10-CM

## 2025-03-20 PROCEDURE — 99213 OFFICE O/P EST LOW 20 MIN: CPT | Performed by: STUDENT IN AN ORGANIZED HEALTH CARE EDUCATION/TRAINING PROGRAM

## 2025-03-20 RX ORDER — NORTRIPTYLINE HYDROCHLORIDE 10 MG/1
10 CAPSULE ORAL AT BEDTIME
Qty: 90 CAPSULE | Refills: 0 | Status: SHIPPED | OUTPATIENT
Start: 2025-03-20

## 2025-03-20 RX ORDER — CELECOXIB 200 MG/1
200 CAPSULE ORAL 2 TIMES DAILY
Qty: 180 CAPSULE | Refills: 0 | Status: SHIPPED | OUTPATIENT
Start: 2025-03-20

## 2025-03-20 ASSESSMENT — PAIN SCALES - GENERAL: PAINLEVEL_OUTOF10: MODERATE PAIN (6)

## 2025-03-20 NOTE — PATIENT INSTRUCTIONS
Physical Therapy: Continue HEP.  Diagnostic Studies: Reviewed cervical MRI   Medication Management:   Increase Celebrex 200mg BID prn   Continue Cyclobenzaprine 5mg TID as needed   Nortriptyline 10mg at nighttime ordered  Procedures:   Patient is considering C6-7 disk replacement at [facility]  No further trigger point injections at this time   Pain Psychologist to address issues of relaxation, behavioral change, coping style, and other factors important to improvement: potentially in future- this was discussed previously, she will reach out if interested.   Follow up: 3 months    Frank Nieves MD  Interventional Pain Medicine  [facility]

## 2025-03-20 NOTE — PROGRESS NOTES
[facility] Follow-up    Date of visit: 3/20/2025    Chief complaint:   Chief Complaint   Patient presents with    Pain    Pain Management       Interval history:  Since her last visit, Stephenie Ochoancer reports:  EMG has been performed which was normal  Cervical MRI was previously completed showing multiple herniated disks.  She has been offered C6-7 disk replacement and is considering this.    Cervical FLORINDA did not give a lot of relief.    Pain scores:  Pain intensity currently is 6 on a scale of 0-10.     Current pain medications include:  Celebrex 100mg BID - H, notices when she misses a dose  Cyclobenzaprine 5mg 3 times daily as needed - Helpful 40%  Occasional Tylenol - Not very helpful     Previous medication treatments included:  Tizanidine - affected LFTs  Ibuprofen  Gabapentin - [place] and developed migraines  Tylenol  Duloxetine - allergic hives, SOB  Lyrica - [place] and made drowsy     Other treatments have included:  Stephenie Ochoancer has not been seen at a pain clinic in the past.   PT: has gone previously, continues to do HEP  Injections:               Cervical FLORINDA on 12/9/24 at [facility]              Left knee CSI - swelled up for 3 days but then did help  Surgery: none  Alternative: chiro - helpful for a few days.  Massage -Helpful with muscle pains     Side Effects: no side effect    Medications:  Current Outpatient Medications   Medication Sig Dispense Refill    celecoxib (CELEBREX) 100 MG capsule Take 1 capsule (100 mg) by mouth 2 times daily. 180 capsule 1    cyclobenzaprine (FLEXERIL) 10 MG tablet Take 1 tablet (10 mg) by mouth 3 times daily as needed for muscle spasms. 90 tablet 3    cyclobenzaprine (FLEXERIL) 5 MG tablet Take 1 tablet (5 mg) by mouth 3 times daily as needed for muscle spasms. 270 tablet 1    estradiol (VAGIFEM) 10 MCG TABS vaginal tablet       estradiol (VIVELLE-DOT) 0.1 MG/24HR bi-weekly patch       methylphenidate (METADATE CD)  20 MG CR capsule       progesterone (PROMETRIUM) 200 MG capsule          Medical History: any changes in medical history since they were last seen? No    Physical Exam:  Blood pressure 123/82, pulse 85, SpO2 100%, not currently breastfeeding.  Constitutional: Well developed, NAD  Head: normocephalic. Atraumatic.   Eyes: no redness or jaundice noted   CV: warm, well perfused extremities   Skin: no suspicious lesions or rashes   Psychiatric: mentation appears normal and affect full    Diagnostics:  none    Personally reviewed: N/A    Assessment:   Cervical radiculopathy affecting right C6/7 dermatomes   Fibromyalgia  Myofascial pain syndrome  Chronic pain syndrome     Stephenie Menendez is a 39 year old female who presents with several years of diffuse joint and muscle pains. Patient has previously seen rheumatology who made the diagnosis of fibromyalgia. She has chronic bilateral neck pain with radicular pain down the lateral right upper extremity to the 1st-3rd digits. Her symptoms are consistent with cervical radiculopathy in the right C6-C7 dermatomes. Cervical MRI revealed right foraminal disc protrusion resulting in C7 impingement. She was evaluated at [facility] and underwent cervical epidural steroid injection on 12/9/24.  She notes that this was not significantly helpful a few weeks.  We started Celebrex which is helping somewhat, but not as good as the meloxicam.  She notes that cyclobenzaprine is still helping.  We will increase the Celebrex to 200 mg twice daily.  Will also start nortriptyline 10 mg at nighttime.  She is still considering surgical management with possible C6-7 disc replacement.  Will see her back in 3 months.        Plan:  Diagnosis reviewed, treatment option addressed, and risk/benefits discussed.      Physical Therapy: Continue HEP.  Diagnostic Studies: Reviewed cervical MRI   Medication Management:   Increase Celebrex 200mg BID prn  Continue Cyclobenzaprine 5mg TID as needed   Nortriptyline  10mg at nighttime ordered  Consider methocarbamol in future  Procedures:   Patient is considering C6-7 disk replacement at [facility]  No further trigger point injections at this time   Pain Psychologist to address issues of relaxation, behavioral change, coping style, and other factors important to improvement: potentially in future- this was discussed previously, she will reach out if interested.   Follow up: 3 months    Frank Nieves MD  Interventional Pain Medicine  [facility]

## 2025-03-20 NOTE — PROGRESS NOTES
Patient presents to the clinic today for a visit  with Frank Nieves MD            9/26/2024     3:26 PM 12/17/2024     4:16 PM 3/20/2025     4:14 PM   PEG Score   PEG Total Score 7 4.33 6       UDS/CSA-na     Medications-celecoxib     QUESTIONS:    [facility] [place]

## 2025-03-21 PROBLEM — R87.610 ASCUS OF CERVIX WITH NEGATIVE HIGH RISK HPV: Status: ACTIVE | Noted: 2018-12-11

## 2025-03-27 ENCOUNTER — ANCILLARY ORDERS (OUTPATIENT)
Dept: FAMILY MEDICINE | Facility: CLINIC | Age: 40
End: 2025-03-27

## 2025-03-27 ENCOUNTER — HOSPITAL ENCOUNTER (OUTPATIENT)
Dept: CT IMAGING | Facility: CLINIC | Age: 40
Discharge: HOME | End: 2025-03-27
Attending: FAMILY MEDICINE
Payer: COMMERCIAL

## 2025-03-27 DIAGNOSIS — E78.2 MIXED HYPERLIPIDEMIA: Primary | ICD-10-CM

## 2025-03-27 DIAGNOSIS — E78.2 MIXED HYPERLIPIDEMIA: ICD-10-CM

## 2025-03-27 LAB
CV CALCIUM SCORE AGATSTON LM: 0
CV CALCIUM SCORING AGATSON LAD: 0
CV CALCIUM SCORING AGATSTON CX: 0
CV CALCIUM SCORING AGATSTON RCA: 0
CV CALCIUM SCORING AGATSTON TOTAL: 0

## 2025-03-27 PROCEDURE — 75571 CT HRT W/O DYE W/CA TEST: CPT

## 2025-04-10 DIAGNOSIS — Z79.899 ENCOUNTER FOR LONG-TERM (CURRENT) USE OF MEDICATIONS: ICD-10-CM

## 2025-04-10 RX ORDER — CYCLOBENZAPRINE HCL 10 MG
10 TABLET ORAL 3 TIMES DAILY PRN
Qty: 90 TABLET | Refills: 3 | Status: SHIPPED | OUTPATIENT
Start: 2025-04-10

## 2025-04-10 NOTE — TELEPHONE ENCOUNTER
Received fax from pharmacy requesting refill(s) for       cyclobenzaprine (FLEXERIL) 10 MG tablet     Date last filled 3/9/2025    Last Appt Date:3/20/2025    Next Appt scheduled: 6/24/2025    Pharmacy:   [facility] [address] [place] [address] route for processing    Lakeisha Seth [facility]

## 2025-04-14 DIAGNOSIS — Z79.899 ENCOUNTER FOR LONG-TERM (CURRENT) USE OF MEDICATIONS: ICD-10-CM

## 2025-04-14 NOTE — TELEPHONE ENCOUNTER
Received fax from pharmacy requesting refill(s) for   Cyclobnzaprine HCL 10mg tab     Last refilled on: Mar. 8, 2025     Patient last seen on: Dec. 17, 2024   Next appt scheduled for: June. 24, 2025       E-prescribe to:  [address] - [address] [place] [address]     Will facilitate refill.      Loida Gan, Clinic Facilitator  [facility]

## 2025-04-15 RX ORDER — CYCLOBENZAPRINE HCL 10 MG
10 TABLET ORAL 3 TIMES DAILY PRN
Qty: 90 TABLET | Refills: 3 | Status: SHIPPED | OUTPATIENT
Start: 2025-04-15

## 2025-04-25 ENCOUNTER — TRANSFERRED RECORDS (OUTPATIENT)
Dept: HEALTH INFORMATION MANAGEMENT | Facility: CLINIC | Age: 40
End: 2025-04-25
Payer: COMMERCIAL

## 2025-05-21 ENCOUNTER — OFFICE VISIT (OUTPATIENT)
Dept: PLASTIC SURGERY | Facility: CLINIC | Age: 40
End: 2025-05-21
Payer: COMMERCIAL

## 2025-05-21 VITALS
SYSTOLIC BLOOD PRESSURE: 123 MMHG | BODY MASS INDEX: 27.78 KG/M2 | DIASTOLIC BLOOD PRESSURE: 80 MMHG | HEART RATE: 83 BPM | OXYGEN SATURATION: 97 % | WEIGHT: 177 LBS | HEIGHT: 67 IN

## 2025-05-21 DIAGNOSIS — Q83.1 ACCESSORY BREAST TISSUE OF AXILLA: ICD-10-CM

## 2025-05-21 RX ORDER — CEFAZOLIN SODIUM 2 G/50ML
2 SOLUTION INTRAVENOUS SEE ADMIN INSTRUCTIONS
OUTPATIENT
Start: 2025-05-21

## 2025-05-21 RX ORDER — CEFAZOLIN SODIUM 2 G/50ML
2 SOLUTION INTRAVENOUS
OUTPATIENT
Start: 2025-05-21

## 2025-05-21 ASSESSMENT — PAIN SCALES - GENERAL: PAINLEVEL_OUTOF10: MODERATE PAIN (5)

## 2025-05-21 NOTE — NURSING NOTE
"Chief Complaint   Patient presents with    Consult     Stephenie, is being seen today for a consult regarding accessory breast tissue of axilla pending Imaging.       Vitals:    05/21/25 0808   BP: 123/80   BP Location: Right arm   Patient Position: Chair   Cuff Size: Adult Regular   Pulse: 83   SpO2: 97%   Weight: 80.3 kg (177 lb)   Height: 1.702 m (5' 7\")       Body mass index is 27.72 kg/m .      Shakira Munguia LPN    "

## 2025-05-21 NOTE — PROGRESS NOTES
Assumed care of pt. Pt states that his r. sided numbness has improved. VSS. Pt resting comfortably in stretcher at this time with stretcher locked and lowered and son at the bedside. Referring Provider:  Susy Matthews MD  [address] [place] [address]     Primary Care Provider:  Susy Matthews      RE: Stephenie Menendez.  : 1985.  EDGARDO: 2025.    Reason for visit: Bilateral axillary breast tissue    HPI: The patient has bilateral axillary breast tissue that is sensitive especially during menstrual cycle.  It is stable in size otherwise.  Last mammogram was done in 2025 and was read as BI-RADS 0.  This was followed with an ultrasound in 2025 which was read as a benign cyst.  The patient has significant sensitivity of the axillary breast tissue and this interferes with her day-to-day activities especially sleeping, wearing of certain clothes, and exercising.  She would like it removed.    Medical history:  Past Medical History:   Diagnosis Date    Abnormal endometrial ultrasound 10/14/2022    Acute antritis 2016    Formatting of this note might be different from the original. Overview:  Recommend over-the-counter Flonase and sinus nasal rinsing with Ocean Spray or simply saline and return to clinic if not improved in the next 2 weeks.  Didn't work worsened. - try augmentin  Last Assessment & Plan:  Recommend over-the-counter Flonase and sinus nasal rinsing with Ocean Spray or simply saline did not help.  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  Overview:   Created by Conversion      Recurrent major depressive episodes 2017    celexa 10 mg po q day Replacement Utility updated for latest IMO load   Formatting of this note  might be different from the original. Overview:  celexa 10 mg po q day  Replacement Utility updated for latest IMO load  Last Assessment & Plan:  She was on Celexa from April through July and then self discontinued it.  This is about the same time when she started to feel fatigued.  I suggested restarti       Surgical history:  Past Surgical History:   Procedure Laterality Date    BIOPSY      TUBAL LIGATION      ZZC  DELIVERY ONLY      Description:  Section;  Recorded: 2014;       Family history:  Family History   Problem Relation Age of Onset    Hypertension Mother     Hyperlipidemia Mother     Hyperthyroidism Mother         benign mass removed.    Thyroid Disease Mother     Obesity Mother     Hypertension Father     Prostate Cancer Father     Hyperlipidemia Father     Anxiety Disorder Father     Myocardial Infarction Father 65    Atrial fibrillation Father     Depression Brother     Anxiety Disorder Brother     Hyperlipidemia Brother     Diabetes Maternal Grandmother     Depression Maternal Grandmother     Heart Disease Maternal Grandmother     Hypertension Maternal Grandmother     Colon Cancer Maternal Grandmother     Lung Cancer Maternal Grandmother     Goiter Maternal Grandmother     Thyroid Disease Maternal Grandmother     Obesity Maternal Grandmother     Hypertension Maternal Grandfather     Hypertension Paternal Grandmother     Cancer Paternal Grandmother         stomach    Hypertension Paternal Grandfather     Autism Spectrum Disorder Daughter     Attention Deficit Disorder Son     Autism Spectrum Disorder Son     Breast Cancer No family hx of        Medications:  Current Outpatient Medications   Medication Sig Dispense Refill    celecoxib (CELEBREX) 200 MG capsule Take 1 capsule (200 mg) by mouth 2 times daily. 180 capsule 0    cyclobenzaprine (FLEXERIL) 10 MG tablet Take 1 tablet (10 mg) by mouth 3 times daily as needed for muscle spasms. 90 tablet 3    cyclobenzaprine (FLEXERIL)  "5 MG tablet Take 1 tablet (5 mg) by mouth 3 times daily as needed for muscle spasms. 270 tablet 1    estradiol (VAGIFEM) 10 MCG TABS vaginal tablet       estradiol (VIVELLE-DOT) 0.1 MG/24HR bi-weekly patch       methylphenidate (METADATE CD) 20 MG CR capsule       progesterone (PROMETRIUM) 200 MG capsule       celecoxib (CELEBREX) 100 MG capsule Take 1 capsule (100 mg) by mouth 2 times daily. 180 capsule 1    nortriptyline (PAMELOR) 10 MG capsule Take 1 capsule (10 mg) by mouth at bedtime. 90 capsule 0       Allergies:  Allergies   Allergen Reactions    Duloxetine Rash and Shortness Of Breath     THROAT CONSTRICTION    Duloxetine Hcl Difficulty breathing and Rash    Imitrex [Sumatriptan]      Respiratory distress.       Social history:   Social History     Tobacco Use    Smoking status: Never    Smokeless tobacco: Never   Substance Use Topics    Alcohol use: Yes     Comment: 1 drink per week, on average         Physical Examination:  /80 (BP Location: Right arm, Patient Position: Chair, Cuff Size: Adult Regular)   Pulse 83   Ht 1.702 m (5' 7\")   Wt 80.3 kg (177 lb)   SpO2 97%   BMI 27.72 kg/m    Body mass index is 27.72 kg/m .    General: No acute distress.    Bilateral axillae: The left side is larger than the right.  There is central axillary tissue on both sides.  There is redundant skin.        ASSESMENT and PLAN:     Based upon the above findings, a diagnosis of bilateral symptomatic axillary tissue was made.  I had a anthony, detailed discussion with the patient, in the presence of my nurse, who was present from beginning to end.  I discussed with the patient the pathophysiology behind the problem, the concept behind the procedure/treatment proposed, expectations of the planned procedure(s), and all giselle-operative steps.     I discussed with the patient options of doing nothing versus excision.  We talked about excising the breast tissue as well as some of the skin to try and improve the overall " folds.  I was very clear that I could not remove all of the excess tissue or the excess breast tissue as breast tissue in this area is normal.  Our aim is to try and reduce it as much as possible.  We talked about the potential risks of wound healing complications, numbness of the arm and axilla, loss of the hairbearing area, recurrence, requirement of further surgeries depending on pathology, and the swelling either locally or in the arm.  She understood everything and wants to proceed.  I also discussed the necessity of prior authorization and the potential refusal by insurance company to proceed with this.  We will send her quotes as well.    All risks, benefits and alternatives, including but not limited to (what applies), pain, infection, bleeding, scarring, asymmetry, seromas, hematomas, wound breakdown, wound dehiscence, loss of the implants/flaps, abdominal wall-healing issues, abdominal wall weakness, bulges, hernias, sensation loss, requirement of further staged procedures, Implant specific issues and complications as discussed above, removal of infected or exposed implants, pneumothoraces, contour abnormalities, cannula injuries to deeper structures, hernias, fat necrosis, lumps and bumps, loss of grafted material, DVT, PE, MI, CVA, pneumonia, renal failure and death, were explained. They were understood and agreed upon by the patient, they were acknowledged by the patient, all the patient's questions were answered in detail to the patient's fullest understanding that they acknowledged, the team approach for treatment in the operating room was agreed upon by the patient, and proceeding with surgery was agreed upon by the patient.      All questions were answered. The patient was happy with the visit. I look forward to helping the patient out in the near future as indicated.       Total time spent in the encounter today including chart review, visit itself, and post-visit paperwork was 45 minutes.        Ladarius Allen MD    Chief, Division of Plastic Surgery  Department of Surgery  [facility]      CC: Susy Matthews MD  [address] [place] [address]  CC: Susy Matthews

## 2025-05-21 NOTE — LETTER
2025       RE: Stephenie QIU Shon  [address]  [facility] [address]     Dear Colleague,    Thank you for referring your patient, Stephenie Menendez, to the [facility] [place] at [facility]. Please see a copy of my visit note below.    Referring Provider:  Susy Matthews MD  [address] [facility] [place] [address]     Primary Care Provider:  Susy Matthews      RE: Stephenie Menendez.  : 1985.  EDGARDO: 2025.    Reason for visit: Bilateral axillary breast tissue    HPI: The patient has bilateral axillary breast tissue that is sensitive especially during menstrual cycle.  It is stable in size otherwise.  Last mammogram was done in 2025 and was read as BI-RADS 0.  This was followed with an ultrasound in 2025 which was read as a benign cyst.  The patient has significant sensitivity of the axillary breast tissue and this interferes with her day-to-day activities especially sleeping, wearing of certain clothes, and exercising.  She would like it removed.    Medical history:  Past Medical History:   Diagnosis Date     Abnormal endometrial ultrasound 10/14/2022     Acute antritis 2016    Formatting of this note might be different from the original. Overview:  Recommend over-the-counter Flonase and sinus nasal rinsing with Ocean Spray or simply saline and return to clinic if not improved in the next 2 weeks.  Didn't work worsened. - try augmentin  Last Assessment & Plan:  Recommend over-the-counter Flonase and sinus nasal rinsing with Ocean Spray or simply saline did not help.  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  Overview:   Created by Conversion       Recurrent major depressive episodes 2017    celexa 10 mg po q day Replacement Utility updated for latest IMO load   Formatting of this note might be different from the original. Overview:  celexa 10 mg po q day  Replacement Utility updated for latest IMO load  Last Assessment & Plan:  She was on Celexa from April through July and then self discontinued it.  This is about the same time when she started to feel fatigued.  I suggested restarti       Surgical history:  Past Surgical History:   Procedure Laterality Date     BIOPSY       TUBAL LIGATION       ZZC  DELIVERY ONLY      Description:  Section;  Recorded: 2014;       Family history:  Family History   Problem Relation Age of Onset     Hypertension Mother      Hyperlipidemia Mother      Hyperthyroidism Mother         benign mass removed.     Thyroid Disease Mother      Obesity Mother      Hypertension Father      Prostate Cancer Father      Hyperlipidemia Father      Anxiety Disorder Father      Myocardial Infarction Father 65     Atrial fibrillation Father      Depression Brother      Anxiety Disorder Brother      Hyperlipidemia Brother      Diabetes Maternal Grandmother      Depression Maternal Grandmother      Heart Disease Maternal Grandmother      Hypertension Maternal Grandmother      Colon Cancer Maternal Grandmother      Lung Cancer Maternal Grandmother      Goiter Maternal Grandmother      Thyroid Disease Maternal Grandmother      Obesity Maternal Grandmother      Hypertension Maternal Grandfather      Hypertension Paternal Grandmother      Cancer Paternal Grandmother         stomach     Hypertension Paternal Grandfather      Autism Spectrum Disorder Daughter      Attention Deficit Disorder Son      Autism Spectrum  "Disorder Son      Breast Cancer No family hx of        Medications:  Current Outpatient Medications   Medication Sig Dispense Refill     celecoxib (CELEBREX) 200 MG capsule Take 1 capsule (200 mg) by mouth 2 times daily. 180 capsule 0     cyclobenzaprine (FLEXERIL) 10 MG tablet Take 1 tablet (10 mg) by mouth 3 times daily as needed for muscle spasms. 90 tablet 3     cyclobenzaprine (FLEXERIL) 5 MG tablet Take 1 tablet (5 mg) by mouth 3 times daily as needed for muscle spasms. 270 tablet 1     estradiol (VAGIFEM) 10 MCG TABS vaginal tablet        estradiol (VIVELLE-DOT) 0.1 MG/24HR bi-weekly patch        methylphenidate (METADATE CD) 20 MG CR capsule        progesterone (PROMETRIUM) 200 MG capsule        celecoxib (CELEBREX) 100 MG capsule Take 1 capsule (100 mg) by mouth 2 times daily. 180 capsule 1     nortriptyline (PAMELOR) 10 MG capsule Take 1 capsule (10 mg) by mouth at bedtime. 90 capsule 0       Allergies:  Allergies   Allergen Reactions     Duloxetine Rash and Shortness Of Breath     THROAT CONSTRICTION     Duloxetine Hcl Difficulty breathing and Rash     Imitrex [Sumatriptan]      Respiratory distress.       Social history:   Social History     Tobacco Use     Smoking status: Never     Smokeless tobacco: Never   Substance Use Topics     Alcohol use: Yes     Comment: 1 drink per week, on average         Physical Examination:  /80 (BP Location: Right arm, Patient Position: Chair, Cuff Size: Adult Regular)   Pulse 83   Ht 1.702 m (5' 7\")   Wt 80.3 kg (177 lb)   SpO2 97%   BMI 27.72 kg/m    Body mass index is 27.72 kg/m .    General: No acute distress.    Bilateral axillae: The left side is larger than the right.  There is central axillary tissue on both sides.  There is redundant skin.        ASSESMENT and PLAN:     Based upon the above findings, a diagnosis of bilateral symptomatic axillary tissue was made.  I had a anthony, detailed discussion with the patient, in the presence of my nurse, who was " present from beginning to end.  I discussed with the patient the pathophysiology behind the problem, the concept behind the procedure/treatment proposed, expectations of the planned procedure(s), and all giselle-operative steps.     I discussed with the patient options of doing nothing versus excision.  We talked about excising the breast tissue as well as some of the skin to try and improve the overall folds.  I was very clear that I could not remove all of the excess tissue or the excess breast tissue as breast tissue in this area is normal.  Our aim is to try and reduce it as much as possible.  We talked about the potential risks of wound healing complications, numbness of the arm and axilla, loss of the hairbearing area, recurrence, requirement of further surgeries depending on pathology, and the swelling either locally or in the arm.  She understood everything and wants to proceed.  I also discussed the necessity of prior authorization and the potential refusal by insurance company to proceed with this.  We will send her quotes as well.    All risks, benefits and alternatives, including but not limited to (what applies), pain, infection, bleeding, scarring, asymmetry, seromas, hematomas, wound breakdown, wound dehiscence, loss of the implants/flaps, abdominal wall-healing issues, abdominal wall weakness, bulges, hernias, sensation loss, requirement of further staged procedures, Implant specific issues and complications as discussed above, removal of infected or exposed implants, pneumothoraces, contour abnormalities, cannula injuries to deeper structures, hernias, fat necrosis, lumps and bumps, loss of grafted material, DVT, PE, MI, CVA, pneumonia, renal failure and death, were explained. They were understood and agreed upon by the patient, they were acknowledged by the patient, all the patient's questions were answered in detail to the patient's fullest understanding that they acknowledged, the team approach for  treatment in the operating room was agreed upon by the patient, and proceeding with surgery was agreed upon by the patient.      All questions were answered. The patient was happy with the visit. I look forward to helping the patient out in the near future as indicated.       Total time spent in the encounter today including chart review, visit itself, and post-visit paperwork was 45 minutes.       Ladarius Allen MD    Chief, Division of Plastic Surgery  Department of Surgery  [facility]      CC: Susy Matthews MD  [address] [place] [address]  CC: Susy Matthews      Again, thank you for allowing me to participate in the care of your patient.      Sincerely,    UYEN Allen MD

## 2025-05-23 PROBLEM — Z01.818 PREOP GENERAL PHYSICAL EXAM: Status: ACTIVE | Noted: 2025-05-23

## 2025-05-26 ENCOUNTER — RESULTS FOLLOW-UP (OUTPATIENT)
Dept: FAMILY MEDICINE | Facility: CLINIC | Age: 40
End: 2025-05-26

## 2025-06-03 ENCOUNTER — TELEPHONE (OUTPATIENT)
Dept: PLASTIC SURGERY | Facility: CLINIC | Age: 40
End: 2025-06-03
Payer: COMMERCIAL

## 2025-06-03 NOTE — TELEPHONE ENCOUNTER
Called and spoke with patient regarding scheduling surgery with Dr. Allen.    Patient stated they are having a separate procedure 6/11/25 and will need to be cleared by their Ortho provider for surgery with Dr. Allen.    Patient stated the soonest date they are available for surgery with Dr. Allen is 10/6/25 with a preference for 10/9 or 10/10.    Writer stated they will work on finding OR time and will call patient back with date options.    Patient confirmed understanding and had no further questions.    Darren Johnson on 6/3/2025 at 4:37 PM

## 2025-06-05 NOTE — TELEPHONE ENCOUNTER
Attempted to call patient regarding scheduling surgery with Dr. Allen    Was unable to leave voicemail due to full mailbox    [facility] sent with available dates after 10/6: 10/15, 10/16, 10/22, 10/27    Provided direct contact line to discuss scheduling    Juliette Juarez on 6/5/2025 at 1:46 PM

## 2025-06-26 ENCOUNTER — TRANSFERRED RECORDS (OUTPATIENT)
Dept: HEALTH INFORMATION MANAGEMENT | Facility: CLINIC | Age: 40
End: 2025-06-26
Payer: COMMERCIAL

## 2025-08-11 ENCOUNTER — TRANSFERRED RECORDS (OUTPATIENT)
Dept: HEALTH INFORMATION MANAGEMENT | Facility: CLINIC | Age: 40
End: 2025-08-11
Payer: COMMERCIAL

## 2025-08-29 ENCOUNTER — TRANSFERRED RECORDS (OUTPATIENT)
Dept: HEALTH INFORMATION MANAGEMENT | Facility: CLINIC | Age: 40
End: 2025-08-29
Payer: COMMERCIAL